# Patient Record
Sex: FEMALE | Race: BLACK OR AFRICAN AMERICAN | NOT HISPANIC OR LATINO | URBAN - METROPOLITAN AREA
[De-identification: names, ages, dates, MRNs, and addresses within clinical notes are randomized per-mention and may not be internally consistent; named-entity substitution may affect disease eponyms.]

---

## 2017-05-08 ENCOUNTER — EMERGENCY (EMERGENCY)
Facility: HOSPITAL | Age: 42
LOS: 0 days | Discharge: HOME | End: 2017-05-08
Attending: EMERGENCY MEDICINE | Admitting: EMERGENCY MEDICINE
Payer: MEDICAID

## 2017-05-08 VITALS
DIASTOLIC BLOOD PRESSURE: 90 MMHG | WEIGHT: 160.5 LBS | TEMPERATURE: 99 F | HEART RATE: 97 BPM | OXYGEN SATURATION: 99 % | SYSTOLIC BLOOD PRESSURE: 151 MMHG | HEIGHT: 63 IN

## 2017-05-08 DIAGNOSIS — F17.210 NICOTINE DEPENDENCE, CIGARETTES, UNCOMPLICATED: ICD-10-CM

## 2017-05-08 DIAGNOSIS — L02.818 CUTANEOUS ABSCESS OF OTHER SITES: ICD-10-CM

## 2017-05-08 DIAGNOSIS — K12.2 CELLULITIS AND ABSCESS OF MOUTH: ICD-10-CM

## 2017-05-08 PROCEDURE — 99283 EMERGENCY DEPT VISIT LOW MDM: CPT | Mod: 25

## 2017-05-08 RX ADMIN — Medication 300 MILLIGRAM(S): at 02:48

## 2017-05-08 NOTE — ED PROVIDER NOTE - ENMT, MLM
Airway patent, Nasal mucosa clear.  Throat has no vesicles, no oropharyngeal exudates and uvula is midline. Pt with 0.5cm abscess to the ant aspect of the upper gums ant.  (+) drainage of purulent fluid.  No surrounding erethema.

## 2017-05-08 NOTE — ED PROVIDER NOTE - OBJECTIVE STATEMENT
42 F presents with co drainage from infection in her mouth.  Pt had been on amoxicillin for the last week.  No co HA, dizziness, cp, sob, abd pain, fever, nvd.

## 2017-05-08 NOTE — ED PROVIDER NOTE - PROGRESS NOTE DETAILS
pressure applies to lesion and all purulent fluid expressed.  Pt used warm water salt rinse and is much improved and stable for DC to follow up with pMD.

## 2017-05-08 NOTE — ED PROVIDER NOTE - MEDICAL DECISION MAKING DETAILS
Pt with drained intra-oral abscess.  advised continues salt water rinse and will Rx with clinda for the next few days.  Pt agrees with plan

## 2018-04-08 ENCOUNTER — EMERGENCY (EMERGENCY)
Facility: HOSPITAL | Age: 43
LOS: 0 days | Discharge: HOME | End: 2018-04-09
Attending: EMERGENCY MEDICINE | Admitting: EMERGENCY MEDICINE
Payer: MEDICAID

## 2018-04-08 VITALS — HEIGHT: 62 IN | WEIGHT: 199.96 LBS

## 2018-04-08 DIAGNOSIS — K05.10 CHRONIC GINGIVITIS, PLAQUE INDUCED: ICD-10-CM

## 2018-04-08 PROCEDURE — 99283 EMERGENCY DEPT VISIT LOW MDM: CPT

## 2018-04-08 RX ORDER — OXYCODONE HYDROCHLORIDE 5 MG/1
1 TABLET ORAL
Qty: 6 | Refills: 0 | OUTPATIENT
Start: 2018-04-08

## 2018-04-08 RX ORDER — CHLORHEXIDINE GLUCONATE 213 G/1000ML
15 SOLUTION TOPICAL ONCE
Qty: 0 | Refills: 0 | Status: COMPLETED | OUTPATIENT
Start: 2018-04-08 | End: 2018-04-08

## 2018-04-08 RX ORDER — LIDOCAINE 4 G/100G
5 CREAM TOPICAL ONCE
Qty: 0 | Refills: 0 | Status: COMPLETED | OUTPATIENT
Start: 2018-04-08 | End: 2018-04-08

## 2018-04-08 RX ORDER — CHLORHEXIDINE GLUCONATE 213 G/1000ML
3 SOLUTION TOPICAL
Qty: 30 | Refills: 0 | OUTPATIENT
Start: 2018-04-08

## 2018-04-08 RX ORDER — OXYCODONE HYDROCHLORIDE 5 MG/1
5 TABLET ORAL ONCE
Qty: 0 | Refills: 0 | Status: DISCONTINUED | OUTPATIENT
Start: 2018-04-08 | End: 2018-04-08

## 2018-04-08 RX ORDER — VALACYCLOVIR 500 MG/1
1000 TABLET, FILM COATED ORAL ONCE
Qty: 0 | Refills: 0 | Status: COMPLETED | OUTPATIENT
Start: 2018-04-08 | End: 2018-04-08

## 2018-04-08 RX ORDER — VALACYCLOVIR 500 MG/1
1 TABLET, FILM COATED ORAL
Qty: 14 | Refills: 0 | OUTPATIENT
Start: 2018-04-08 | End: 2018-04-14

## 2018-04-08 RX ADMIN — LIDOCAINE 5 MILLILITER(S): 4 CREAM TOPICAL at 23:40

## 2018-04-08 RX ADMIN — VALACYCLOVIR 1000 MILLIGRAM(S): 500 TABLET, FILM COATED ORAL at 23:40

## 2018-04-08 RX ADMIN — OXYCODONE HYDROCHLORIDE 5 MILLIGRAM(S): 5 TABLET ORAL at 23:40

## 2018-04-08 NOTE — ED STATDOCS - OBJECTIVE STATEMENT
41 y/o female with hx of dental infections and dental carries with 1 week of right sided upper tooth pain. Dentist put her on Clindamycin but because of too many GI side effects, pt was switched [id number] Amoxacillin. Presents with blisters, sore gums on right side upper lower mouth with associated pain. Taking Ibuprofen, Tylenol with no relief.

## 2018-04-08 NOTE — ED STATDOCS - ENMT, MLM
Nasal mucosa clear.  Throat has no vesicles, no oropharyngeal exudates and uvula is midline. vesicular like lesion on gums on right side. no lesion on cheeks. no lesion on tongue. no trismus. chronic dental carries. no evidence of dental abscess.

## 2018-04-09 VITALS
HEART RATE: 89 BPM | SYSTOLIC BLOOD PRESSURE: 139 MMHG | TEMPERATURE: 99 F | OXYGEN SATURATION: 100 % | RESPIRATION RATE: 19 BRPM | DIASTOLIC BLOOD PRESSURE: 70 MMHG

## 2018-04-09 RX ADMIN — CHLORHEXIDINE GLUCONATE 15 MILLILITER(S): 213 SOLUTION TOPICAL at 00:31

## 2018-04-23 ENCOUNTER — EMERGENCY (EMERGENCY)
Facility: HOSPITAL | Age: 43
LOS: 0 days | Discharge: HOME | End: 2018-04-23
Attending: EMERGENCY MEDICINE | Admitting: EMERGENCY MEDICINE
Payer: MEDICAID

## 2018-04-23 VITALS
HEART RATE: 82 BPM | OXYGEN SATURATION: 100 % | SYSTOLIC BLOOD PRESSURE: 179 MMHG | TEMPERATURE: 98 F | DIASTOLIC BLOOD PRESSURE: 96 MMHG | RESPIRATION RATE: 19 BRPM

## 2018-04-23 VITALS — HEIGHT: 63 IN | WEIGHT: 175.05 LBS
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
## 2018-04-23 DIAGNOSIS — S39.92XA UNSPECIFIED INJURY OF LOWER BACK, INITIAL ENCOUNTER: ICD-10-CM

## 2018-04-23 DIAGNOSIS — M54.5 LOW BACK PAIN: ICD-10-CM
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
## 2018-04-23 PROCEDURE — 99283 EMERGENCY DEPT VISIT LOW MDM: CPT

## 2018-04-23 PROCEDURE — 72100 X-RAY EXAM L-S SPINE 2/3 VWS: CPT | Mod: 26

## 2018-04-23 RX ORDER — OXYCODONE HYDROCHLORIDE 5 MG/1
10 TABLET ORAL ONCE
Qty: 0 | Refills: 0 | Status: DISCONTINUED | OUTPATIENT
Start: 2018-04-23 | End: 2018-04-23

## 2018-04-23 RX ORDER — PREDNISOLONE 5 MG
60 TABLET ORAL ONCE
Qty: 0 | Refills: 0 | Status: DISCONTINUED | OUTPATIENT
Start: 2018-04-23 | End: 2018-04-23

## 2018-04-23 RX ORDER — OXYCODONE HYDROCHLORIDE 5 MG/1
10 TABLET ORAL EVERY 12 HOURS
Qty: 0 | Refills: 0 | Status: DISCONTINUED | OUTPATIENT
Start: 2018-04-23 | End: 2018-04-23

## 2018-04-23 RX ORDER — OXYCODONE HYDROCHLORIDE 5 MG/1
1 TABLET ORAL
Qty: 12 | Refills: 0 | OUTPATIENT
Start: 2018-04-23

## 2018-04-23 RX ADMIN — OXYCODONE HYDROCHLORIDE 10 MILLIGRAM(S): 5 TABLET ORAL at 13:11

## 2018-04-23 RX ADMIN — Medication 60 MILLIGRAM(S): at 13:12

## 2018-04-23 NOTE — ED STATDOCS - CARE PLAN
Principal Discharge DX:	Back pain  Assessment and plan of treatment:	Call your primary care doctor today or tomorrow to schedule follow up appointment for within the next 3-5 days.  Continue taking your medications as prescribed.  Take oxycodone 1 tablet every 4-6 hours as needed for pain. Try to use the percocet as infrequently as possible. Do not drive or drink alcohol while taking Percocet. Do not mix this medication with Tylenol as it contains Tylenol. Stay hydrated when taking this medication as it very commonly causes constipation. You are encouraged to use the stool softener Colace (also called docusate sodium, available over the counter) to avoid constipation. Take this medication sparingly and only when you experience severe pain as it has addiction potential.   Take prednisone course as prescribed.  Return to this Emergency Department if you experience worsening condition or for any other emergency.

## 2018-04-23 NOTE — ED STATDOCS - MEDICAL DECISION MAKING DETAILS
42 y/o F c/o worsening lower back pain, recent slip in shower this morning, "jerked" her back, did not fall with plans to receive pain medication

## 2018-04-23 NOTE — ED ADULT TRIAGE NOTE - CHIEF COMPLAINT QUOTE
pt states lumbar back pain for the past 2 days exacerbated today when she slipped and fell in the shower after her knee gave out. states left leg pain as well. no head trauma or LOC after fall.

## 2018-04-23 NOTE — ED STATDOCS - OBJECTIVE STATEMENT
42 y/o F with a PMhx of chronic back pain on steroids presents to the ED c/o worsening lower lumbar back pain over the past x2 days. Pt states that she recently slipped in shower this morning due to knee giving out which exacerbated the pain, but did not fall. Pt states that she has a neurologist who can no longer see her due to insurance complications. Pt currently calm, able to stand, ambulate, sitting comfortably in POC and denies fever, cough, chills, abd pain, NVD or any other acute c/o at this time.

## 2018-04-23 NOTE — ED STATDOCS - ATTENDING CONTRIBUTION TO CARE
I, Audrey Barrera MD,  performed the initial face to face bedside interview with this patient regarding history of present illness, review of symptoms and relevant past medical, social and family history.  I completed an independent physical examination.  I was the initial provider who evaluated this patient. I have signed out the follow up of any pending tests (i.e. labs, radiological studies) to the resident.  I have communicated the patient’s plan of care and disposition with the resident.  The history, relevant review of systems, past medical and surgical history, medical decision making, and physical examination was documented by the scribe in my presence and I attest to the accuracy of the documentation.

## 2018-04-23 NOTE — ED STATDOCS - PLAN OF CARE
Call your primary care doctor today or tomorrow to schedule follow up appointment for within the next 3-5 days.  Continue taking your medications as prescribed.  Take oxycodone 1 tablet every 4-6 hours as needed for pain. Try to use the percocet as infrequently as possible. Do not drive or drink alcohol while taking Percocet. Do not mix this medication with Tylenol as it contains Tylenol. Stay hydrated when taking this medication as it very commonly causes constipation. You are encouraged to use the stool softener Colace (also called docusate sodium, available over the counter) to avoid constipation. Take this medication sparingly and only when you experience severe pain as it has addiction potential.   Take prednisone course as prescribed.  Return to this Emergency Department if you experience worsening condition or for any other emergency.

## 2018-04-23 NOTE — ED STATDOCS - PROGRESS NOTE DETAILS
Carlos Langley MD PGY4: Patient seen with attending Dr. Barrera. Pt reassessed after steroids and oxycodone, improved and ambulating at baseline. Imaging reviewed. Pt feels comfortable following up. Prednisone course and oxycodone prescribed. Will discharge with instructions for outpatient follow-up.

## 2018-06-01 ENCOUNTER — APPOINTMENT (OUTPATIENT)
Dept: NEUROLOGY | Facility: CLINIC | Age: 43
End: 2018-06-01

## 2018-08-04 ENCOUNTER — EMERGENCY (EMERGENCY)
Facility: HOSPITAL | Age: 43
LOS: 1 days | Discharge: HOME | End: 2018-08-04
Attending: EMERGENCY MEDICINE
Payer: COMMERCIAL

## 2018-08-04 VITALS
WEIGHT: 199.96 LBS | SYSTOLIC BLOOD PRESSURE: 143 MMHG | RESPIRATION RATE: 18 BRPM | HEART RATE: 98 BPM | DIASTOLIC BLOOD PRESSURE: 88 MMHG | TEMPERATURE: 99 F | HEIGHT: 68 IN | OXYGEN SATURATION: 100 %

## 2018-08-04 VITALS
OXYGEN SATURATION: 98 % | DIASTOLIC BLOOD PRESSURE: 84 MMHG | SYSTOLIC BLOOD PRESSURE: 132 MMHG | RESPIRATION RATE: 19 BRPM | HEART RATE: 91 BPM | TEMPERATURE: 99 F

## 2018-08-04 DIAGNOSIS — Z90.49 ACQUIRED ABSENCE OF OTHER SPECIFIED PARTS OF DIGESTIVE TRACT: Chronic | ICD-10-CM

## 2018-08-04 PROBLEM — K04.7 PERIAPICAL ABSCESS WITHOUT SINUS: Chronic | Status: ACTIVE | Noted: 2018-04-08

## 2018-08-04 LAB
ALBUMIN SERPL ELPH-MCNC: 3.8 G/DL — SIGNIFICANT CHANGE UP (ref 3.3–5.2)
ALBUMIN SERPL ELPH-MCNC: 3.9 G/DL — SIGNIFICANT CHANGE UP (ref 3.3–5.2)
ALP SERPL-CCNC: 66 U/L — SIGNIFICANT CHANGE UP (ref 40–120)
ALP SERPL-CCNC: 76 U/L — SIGNIFICANT CHANGE UP (ref 40–120)
ALT FLD-CCNC: 10 U/L — SIGNIFICANT CHANGE UP
ALT FLD-CCNC: 10 U/L — SIGNIFICANT CHANGE UP
ANION GAP SERPL CALC-SCNC: 14 MMOL/L — SIGNIFICANT CHANGE UP (ref 5–17)
ANION GAP SERPL CALC-SCNC: 21 MMOL/L — HIGH (ref 5–17)
APPEARANCE UR: CLEAR — SIGNIFICANT CHANGE UP
APTT BLD: 34.2 SEC — SIGNIFICANT CHANGE UP (ref 27.5–37.4)
AST SERPL-CCNC: 21 U/L — SIGNIFICANT CHANGE UP
AST SERPL-CCNC: 24 U/L — SIGNIFICANT CHANGE UP
BASOPHILS # BLD AUTO: 0 K/UL — SIGNIFICANT CHANGE UP (ref 0–0.2)
BASOPHILS NFR BLD AUTO: 0.2 % — SIGNIFICANT CHANGE UP (ref 0–2)
BILIRUB SERPL-MCNC: 0.4 MG/DL — SIGNIFICANT CHANGE UP (ref 0.4–2)
BILIRUB SERPL-MCNC: 0.6 MG/DL — SIGNIFICANT CHANGE UP (ref 0.4–2)
BILIRUB UR-MCNC: NEGATIVE — SIGNIFICANT CHANGE UP
BUN SERPL-MCNC: 6 MG/DL — LOW (ref 8–20)
BUN SERPL-MCNC: 7 MG/DL — LOW (ref 8–20)
CALCIUM SERPL-MCNC: 9.2 MG/DL — SIGNIFICANT CHANGE UP (ref 8.6–10.2)
CALCIUM SERPL-MCNC: 9.3 MG/DL — SIGNIFICANT CHANGE UP (ref 8.6–10.2)
CHLORIDE SERPL-SCNC: 90 MMOL/L — LOW (ref 98–107)
CHLORIDE SERPL-SCNC: 91 MMOL/L — LOW (ref 98–107)
CO2 SERPL-SCNC: 23 MMOL/L — SIGNIFICANT CHANGE UP (ref 22–29)
CO2 SERPL-SCNC: 27 MMOL/L — SIGNIFICANT CHANGE UP (ref 22–29)
COLOR SPEC: YELLOW — SIGNIFICANT CHANGE UP
CREAT SERPL-MCNC: 0.79 MG/DL — SIGNIFICANT CHANGE UP (ref 0.5–1.3)
CREAT SERPL-MCNC: 0.9 MG/DL — SIGNIFICANT CHANGE UP (ref 0.5–1.3)
DIFF PNL FLD: NEGATIVE — SIGNIFICANT CHANGE UP
EOSINOPHIL # BLD AUTO: 0.1 K/UL — SIGNIFICANT CHANGE UP (ref 0–0.5)
EOSINOPHIL NFR BLD AUTO: 2.1 % — SIGNIFICANT CHANGE UP (ref 0–6)
EPI CELLS # UR: SIGNIFICANT CHANGE UP
GLUCOSE SERPL-MCNC: 133 MG/DL — HIGH (ref 70–115)
GLUCOSE SERPL-MCNC: 141 MG/DL — HIGH (ref 70–115)
GLUCOSE UR QL: NEGATIVE MG/DL — SIGNIFICANT CHANGE UP
HCG SERPL-ACNC: <5 MIU/ML — SIGNIFICANT CHANGE UP
HCT VFR BLD CALC: 41.6 % — SIGNIFICANT CHANGE UP (ref 37–47)
HGB BLD-MCNC: 14.6 G/DL — SIGNIFICANT CHANGE UP (ref 12–16)
INR BLD: 1.21 RATIO — HIGH (ref 0.88–1.16)
KETONES UR-MCNC: ABNORMAL
LEUKOCYTE ESTERASE UR-ACNC: NEGATIVE — SIGNIFICANT CHANGE UP
LYMPHOCYTES # BLD AUTO: 1.3 K/UL — SIGNIFICANT CHANGE UP (ref 1–4.8)
LYMPHOCYTES # BLD AUTO: 22.8 % — SIGNIFICANT CHANGE UP (ref 20–55)
MCHC RBC-ENTMCNC: 30.9 PG — SIGNIFICANT CHANGE UP (ref 27–31)
MCHC RBC-ENTMCNC: 35.1 G/DL — SIGNIFICANT CHANGE UP (ref 32–36)
MCV RBC AUTO: 87.9 FL — SIGNIFICANT CHANGE UP (ref 81–99)
MONOCYTES # BLD AUTO: 0.5 K/UL — SIGNIFICANT CHANGE UP (ref 0–0.8)
MONOCYTES NFR BLD AUTO: 8.8 % — SIGNIFICANT CHANGE UP (ref 3–10)
NEUTROPHILS # BLD AUTO: 3.7 K/UL — SIGNIFICANT CHANGE UP (ref 1.8–8)
NEUTROPHILS NFR BLD AUTO: 65.9 % — SIGNIFICANT CHANGE UP (ref 37–73)
NITRITE UR-MCNC: NEGATIVE — SIGNIFICANT CHANGE UP
PH UR: 6.5 — SIGNIFICANT CHANGE UP (ref 5–8)
PLATELET # BLD AUTO: 269 K/UL — SIGNIFICANT CHANGE UP (ref 150–400)
POTASSIUM SERPL-MCNC: 2.8 MMOL/L — CRITICAL LOW (ref 3.5–5.3)
POTASSIUM SERPL-MCNC: 3.4 MMOL/L — LOW (ref 3.5–5.3)
POTASSIUM SERPL-SCNC: 2.8 MMOL/L — CRITICAL LOW (ref 3.5–5.3)
POTASSIUM SERPL-SCNC: 3.4 MMOL/L — LOW (ref 3.5–5.3)
PROT SERPL-MCNC: 7.6 G/DL — SIGNIFICANT CHANGE UP (ref 6.6–8.7)
PROT SERPL-MCNC: 8.3 G/DL — SIGNIFICANT CHANGE UP (ref 6.6–8.7)
PROT UR-MCNC: 30 MG/DL
PROTHROM AB SERPL-ACNC: 13.4 SEC — HIGH (ref 9.8–12.7)
RBC # BLD: 4.73 M/UL — SIGNIFICANT CHANGE UP (ref 4.4–5.2)
RBC # FLD: 13.6 % — SIGNIFICANT CHANGE UP (ref 11–15.6)
SODIUM SERPL-SCNC: 132 MMOL/L — LOW (ref 135–145)
SODIUM SERPL-SCNC: 134 MMOL/L — LOW (ref 135–145)
SP GR SPEC: 1 — LOW (ref 1.01–1.02)
UROBILINOGEN FLD QL: NEGATIVE MG/DL — SIGNIFICANT CHANGE UP
WBC # BLD: 5.7 K/UL — SIGNIFICANT CHANGE UP (ref 4.8–10.8)
WBC # FLD AUTO: 5.7 K/UL — SIGNIFICANT CHANGE UP (ref 4.8–10.8)
WBC UR QL: SIGNIFICANT CHANGE UP

## 2018-08-04 PROCEDURE — 76830 TRANSVAGINAL US NON-OB: CPT | Mod: 26

## 2018-08-04 PROCEDURE — 76856 US EXAM PELVIC COMPLETE: CPT

## 2018-08-04 PROCEDURE — 99284 EMERGENCY DEPT VISIT MOD MDM: CPT | Mod: 25

## 2018-08-04 PROCEDURE — 81001 URINALYSIS AUTO W/SCOPE: CPT

## 2018-08-04 PROCEDURE — 99284 EMERGENCY DEPT VISIT MOD MDM: CPT

## 2018-08-04 PROCEDURE — 96374 THER/PROPH/DIAG INJ IV PUSH: CPT | Mod: XU

## 2018-08-04 PROCEDURE — 96375 TX/PRO/DX INJ NEW DRUG ADDON: CPT

## 2018-08-04 PROCEDURE — 74177 CT ABD & PELVIS W/CONTRAST: CPT

## 2018-08-04 PROCEDURE — 85027 COMPLETE CBC AUTOMATED: CPT

## 2018-08-04 PROCEDURE — 76830 TRANSVAGINAL US NON-OB: CPT

## 2018-08-04 PROCEDURE — 85610 PROTHROMBIN TIME: CPT

## 2018-08-04 PROCEDURE — 74177 CT ABD & PELVIS W/CONTRAST: CPT | Mod: 26

## 2018-08-04 PROCEDURE — 80053 COMPREHEN METABOLIC PANEL: CPT

## 2018-08-04 PROCEDURE — 85730 THROMBOPLASTIN TIME PARTIAL: CPT

## 2018-08-04 PROCEDURE — 84702 CHORIONIC GONADOTROPIN TEST: CPT

## 2018-08-04 PROCEDURE — 36415 COLL VENOUS BLD VENIPUNCTURE: CPT

## 2018-08-04 PROCEDURE — 76856 US EXAM PELVIC COMPLETE: CPT | Mod: 26

## 2018-08-04 RX ORDER — ACETAMINOPHEN 500 MG
650 TABLET ORAL ONCE
Qty: 0 | Refills: 0 | Status: COMPLETED | OUTPATIENT
Start: 2018-08-04 | End: 2018-08-04

## 2018-08-04 RX ORDER — POTASSIUM CHLORIDE 20 MEQ
10 PACKET (EA) ORAL
Qty: 0 | Refills: 0 | Status: COMPLETED | OUTPATIENT
Start: 2018-08-04 | End: 2018-08-04

## 2018-08-04 RX ORDER — SODIUM CHLORIDE 9 MG/ML
2000 INJECTION, SOLUTION INTRAVENOUS ONCE
Qty: 0 | Refills: 0 | Status: COMPLETED | OUTPATIENT
Start: 2018-08-04 | End: 2018-08-04

## 2018-08-04 RX ORDER — ONDANSETRON 8 MG/1
8 TABLET, FILM COATED ORAL ONCE
Qty: 0 | Refills: 0 | Status: COMPLETED | OUTPATIENT
Start: 2018-08-04 | End: 2018-08-04

## 2018-08-04 RX ORDER — POTASSIUM CHLORIDE 20 MEQ
40 PACKET (EA) ORAL ONCE
Qty: 0 | Refills: 0 | Status: COMPLETED | OUTPATIENT
Start: 2018-08-04 | End: 2018-08-04

## 2018-08-04 RX ADMIN — Medication 40 MILLIEQUIVALENT(S): at 23:00

## 2018-08-04 RX ADMIN — SODIUM CHLORIDE 2000 MILLILITER(S): 9 INJECTION, SOLUTION INTRAVENOUS at 14:52

## 2018-08-04 RX ADMIN — ONDANSETRON 8 MILLIGRAM(S): 8 TABLET, FILM COATED ORAL at 14:51

## 2018-08-04 RX ADMIN — Medication 100 MILLIEQUIVALENT(S): at 21:57

## 2018-08-04 RX ADMIN — Medication 100 MILLIEQUIVALENT(S): at 17:47

## 2018-08-04 RX ADMIN — Medication 100 MILLIEQUIVALENT(S): at 14:51

## 2018-08-04 NOTE — ED ADULT NURSE NOTE - NSIMPLEMENTINTERV_GEN_ALL_ED
Implemented All Universal Safety Interventions:  [place] to call system. Call bell, personal items and telephone within reach. Instruct patient to call for assistance. Room bathroom lighting operational. Non-slip footwear when patient is off stretcher. Physically safe environment: no spills, clutter or unnecessary equipment. Stretcher in lowest position, wheels locked, appropriate side rails in place.

## 2018-08-04 NOTE — ED ADULT NURSE REASSESSMENT NOTE - NS ED NURSE REASSESS COMMENT FT1
Assumed care of pt at 1930. pt ambulating through martin with steady gait, in no apparent distress or discomfort. pt found outside smoking by previous RN, pt informed cannot leave the ED to smoke, especially with IV in place. pt verbalizes understanding. ED MD @ bedside, per MD meal provided. pt awaiting 3rd K-rider and US, will continue to monitor.

## 2018-08-04 NOTE — CONSULT NOTE ADULT - SUBJECTIVE AND OBJECTIVE BOX
Patient is a 44 y/o woman presenting with a 1 week history of vomiting, diarrhea, and RLQ pain. She reports this being the usual presentation for her pancreatitis. Patient was found to have bilateral right ovarian cysts on pelvic CT, the right ovarian cyst measuring 4.6cm. Ultrasound found the right ovarian cyst to measure 4.5cm.     PMH: pancreatitis, DM, HTN, and Rheumatoid Arthritis.   PSH: cholecystectomy, hysterectomy     O:   Vitals: BP (132/84), HR (91)  Gen: no acute distress  Patient deferred physical exam.     < from: US Transvaginal (08.04.18 @ 20:22) >  Right ovary: Mildly enlarged measuring 5.6 x 4.2 x 4.1 cm secondary to a   cyst measuring 4.5 x 3.1 x 3.9 cm cm. 1.2 cm right paraovarian cyst.   Color-flow and arterial waveforms identified.    Left ovary: Not well visualized transabdominally or transvaginally.    Fluid: Small to moderate free fluid in the pelvic cul-de-sac.    IMPRESSION:    Status post hysterectomy. Mildly enlarged right ovary secondary to a 4.5   cm cyst. While flow is present, this does not preclude torsion and   clinical correlation is recommended. Additionally, follow-up pelvic   sonogram in 6 weeks is recommended to reassess cyst for stability and/or   resolution.    < end of copied text >  < from: CT Abdomen and Pelvis w/ IV Cont (08.04.18 @ 16:19) >  IMPRESSION:     No acute pathology.    Bilateral adnexal cysts measuring 4.6 cm on the right and 2.0 cm on the   left. Small pelvic free fluid. No free air.    < end of copied text >      A/P: 44 y/o presenting with RLQ pain, vomiting, and diarrhea with incidental finding of right ovarian cyst measuring 4.6cm being assessed for possible torsion. At this time there is low clinical suspicion of ovarian torsion given the patients mild distress, long standing pancreatitis history with similar symptoms, and visualized blood flow to the ovary on ultrasound. Patient will follow up with cyst progression with Gynecologist on outpatient setting.

## 2018-08-04 NOTE — ED PROVIDER NOTE - OBJECTIVE STATEMENT
44 y/o F pt with PMHx of pancreatitis, DM and HTN and PSHx of cholecystectomy and hysterectomy presents to ED c/o diffuse abdominal pain, vomiting, and diarrhea with decreased PO intake x1 week. Pt was evaluated at [facility] 3 days ago and was diagnosed with hypokalemia. She had a CT with PO contrast but is unsure of the results. She was given potassium and went home with Zofran without relief. Pt states she experiences about 3 episodes of vomiting a day (white colored) and states "I have diarrhea all day" described as yellow and black mucous. She denies use of iron pills. Pt denies fever, chills, CP, SOB, back pain, dysuria, hematuria, bloody stool, hematemesis, and headache. No further complaints at this time.

## 2018-08-04 NOTE — ED PROVIDER NOTE - PROGRESS NOTE DETAILS
Case s/o to Dr Linn pending GYN consult for right ovarian cyst and repeat CMP Pt seen  by GYN/Dr. Flores and pt refusing exam.  Repeat K 3.4.  will give po K and pt to f/u PMD GYN and Med/Dr. Garcia as outpt

## 2018-08-06 ENCOUNTER — EMERGENCY (EMERGENCY)
Facility: HOSPITAL | Age: 43
LOS: 1 days | Discharge: HOME | End: 2018-08-06
Attending: EMERGENCY MEDICINE
Payer: COMMERCIAL

## 2018-08-06 VITALS
HEART RATE: 86 BPM | SYSTOLIC BLOOD PRESSURE: 134 MMHG | TEMPERATURE: 99 F | RESPIRATION RATE: 18 BRPM | DIASTOLIC BLOOD PRESSURE: 82 MMHG | OXYGEN SATURATION: 100 %

## 2018-08-06 VITALS — WEIGHT: 199.96 LBS | HEIGHT: 63 IN

## 2018-08-06 DIAGNOSIS — Z90.49 ACQUIRED ABSENCE OF OTHER SPECIFIED PARTS OF DIGESTIVE TRACT: Chronic | ICD-10-CM

## 2018-08-06 PROBLEM — I10 ESSENTIAL (PRIMARY) HYPERTENSION: Chronic | Status: ACTIVE | Noted: 2018-08-04

## 2018-08-06 LAB
ALBUMIN SERPL ELPH-MCNC: 3.7 G/DL — SIGNIFICANT CHANGE UP (ref 3.3–5.2)
ALP SERPL-CCNC: 66 U/L — SIGNIFICANT CHANGE UP (ref 40–120)
ALT FLD-CCNC: 9 U/L — SIGNIFICANT CHANGE UP
ANION GAP SERPL CALC-SCNC: 16 MMOL/L — SIGNIFICANT CHANGE UP (ref 5–17)
APPEARANCE UR: CLEAR — SIGNIFICANT CHANGE UP
AST SERPL-CCNC: 23 U/L — SIGNIFICANT CHANGE UP
BASOPHILS # BLD AUTO: 0 K/UL — SIGNIFICANT CHANGE UP (ref 0–0.2)
BASOPHILS NFR BLD AUTO: 0.1 % — SIGNIFICANT CHANGE UP (ref 0–2)
BILIRUB SERPL-MCNC: 0.4 MG/DL — SIGNIFICANT CHANGE UP (ref 0.4–2)
BILIRUB UR-MCNC: NEGATIVE — SIGNIFICANT CHANGE UP
BUN SERPL-MCNC: 7 MG/DL — LOW (ref 8–20)
CALCIUM SERPL-MCNC: 9 MG/DL — SIGNIFICANT CHANGE UP (ref 8.6–10.2)
CHLORIDE SERPL-SCNC: 97 MMOL/L — LOW (ref 98–107)
CO2 SERPL-SCNC: 23 MMOL/L — SIGNIFICANT CHANGE UP (ref 22–29)
COLOR SPEC: YELLOW — SIGNIFICANT CHANGE UP
CREAT SERPL-MCNC: 0.68 MG/DL — SIGNIFICANT CHANGE UP (ref 0.5–1.3)
DIFF PNL FLD: NEGATIVE — SIGNIFICANT CHANGE UP
EOSINOPHIL # BLD AUTO: 0 K/UL — SIGNIFICANT CHANGE UP (ref 0–0.5)
EOSINOPHIL NFR BLD AUTO: 0.6 % — SIGNIFICANT CHANGE UP (ref 0–6)
GLUCOSE SERPL-MCNC: 103 MG/DL — SIGNIFICANT CHANGE UP (ref 70–115)
GLUCOSE UR QL: NEGATIVE MG/DL — SIGNIFICANT CHANGE UP
HCG SERPL-ACNC: <5 MIU/ML — SIGNIFICANT CHANGE UP
HCT VFR BLD CALC: 39.1 % — SIGNIFICANT CHANGE UP (ref 37–47)
HGB BLD-MCNC: 13.6 G/DL — SIGNIFICANT CHANGE UP (ref 12–16)
KETONES UR-MCNC: ABNORMAL
LEUKOCYTE ESTERASE UR-ACNC: NEGATIVE — SIGNIFICANT CHANGE UP
LIDOCAIN IGE QN: 42 U/L — SIGNIFICANT CHANGE UP (ref 22–51)
LYMPHOCYTES # BLD AUTO: 1.2 K/UL — SIGNIFICANT CHANGE UP (ref 1–4.8)
LYMPHOCYTES # BLD AUTO: 15.5 % — LOW (ref 20–55)
MAGNESIUM SERPL-MCNC: 1.9 MG/DL — SIGNIFICANT CHANGE UP (ref 1.6–2.6)
MCHC RBC-ENTMCNC: 31.1 PG — HIGH (ref 27–31)
MCHC RBC-ENTMCNC: 34.8 G/DL — SIGNIFICANT CHANGE UP (ref 32–36)
MCV RBC AUTO: 89.3 FL — SIGNIFICANT CHANGE UP (ref 81–99)
MONOCYTES # BLD AUTO: 0.6 K/UL — SIGNIFICANT CHANGE UP (ref 0–0.8)
MONOCYTES NFR BLD AUTO: 7 % — SIGNIFICANT CHANGE UP (ref 3–10)
NEUTROPHILS # BLD AUTO: 6.1 K/UL — SIGNIFICANT CHANGE UP (ref 1.8–8)
NEUTROPHILS NFR BLD AUTO: 76.5 % — HIGH (ref 37–73)
NITRITE UR-MCNC: NEGATIVE — SIGNIFICANT CHANGE UP
PH UR: 6 — SIGNIFICANT CHANGE UP (ref 5–8)
PLATELET # BLD AUTO: 239 K/UL — SIGNIFICANT CHANGE UP (ref 150–400)
POTASSIUM SERPL-MCNC: 3.4 MMOL/L — LOW (ref 3.5–5.3)
POTASSIUM SERPL-SCNC: 3.4 MMOL/L — LOW (ref 3.5–5.3)
PROT SERPL-MCNC: 7.6 G/DL — SIGNIFICANT CHANGE UP (ref 6.6–8.7)
PROT UR-MCNC: NEGATIVE MG/DL — SIGNIFICANT CHANGE UP
RBC # BLD: 4.38 M/UL — LOW (ref 4.4–5.2)
RBC # FLD: 13.7 % — SIGNIFICANT CHANGE UP (ref 11–15.6)
SODIUM SERPL-SCNC: 136 MMOL/L — SIGNIFICANT CHANGE UP (ref 135–145)
SP GR SPEC: 1 — LOW (ref 1.01–1.02)
UROBILINOGEN FLD QL: NEGATIVE MG/DL — SIGNIFICANT CHANGE UP
WBC # BLD: 8 K/UL — SIGNIFICANT CHANGE UP (ref 4.8–10.8)
WBC # FLD AUTO: 8 K/UL — SIGNIFICANT CHANGE UP (ref 4.8–10.8)

## 2018-08-06 PROCEDURE — 80053 COMPREHEN METABOLIC PANEL: CPT

## 2018-08-06 PROCEDURE — 85027 COMPLETE CBC AUTOMATED: CPT

## 2018-08-06 PROCEDURE — 99283 EMERGENCY DEPT VISIT LOW MDM: CPT

## 2018-08-06 PROCEDURE — 36415 COLL VENOUS BLD VENIPUNCTURE: CPT

## 2018-08-06 PROCEDURE — 99284 EMERGENCY DEPT VISIT MOD MDM: CPT

## 2018-08-06 PROCEDURE — 83690 ASSAY OF LIPASE: CPT

## 2018-08-06 PROCEDURE — 83735 ASSAY OF MAGNESIUM: CPT

## 2018-08-06 PROCEDURE — 84702 CHORIONIC GONADOTROPIN TEST: CPT

## 2018-08-06 PROCEDURE — 81003 URINALYSIS AUTO W/O SCOPE: CPT

## 2018-08-06 RX ORDER — SODIUM CHLORIDE 9 MG/ML
1000 INJECTION INTRAMUSCULAR; INTRAVENOUS; SUBCUTANEOUS ONCE
Qty: 0 | Refills: 0 | Status: COMPLETED | OUTPATIENT
Start: 2018-08-06 | End: 2018-08-06

## 2018-08-06 RX ORDER — POTASSIUM CHLORIDE 20 MEQ
40 PACKET (EA) ORAL ONCE
Qty: 0 | Refills: 0 | Status: COMPLETED | OUTPATIENT
Start: 2018-08-06 | End: 2018-08-06

## 2018-08-06 RX ADMIN — Medication 40 MILLIEQUIVALENT(S): at 13:58

## 2018-08-06 RX ADMIN — SODIUM CHLORIDE 1000 MILLILITER(S): 9 INJECTION INTRAMUSCULAR; INTRAVENOUS; SUBCUTANEOUS at 15:14

## 2018-08-06 NOTE — ED ADULT TRIAGE NOTE - CHIEF COMPLAINT QUOTE
'I was here the other day and they told me I was hypokalemia and I still am. I can't see my doctor. " Pt states her symptoms are nausea and diarrhea.

## 2018-08-06 NOTE — ED STATDOCS - OBJECTIVE STATEMENT
43yoF with h/o diet-controlled DM, HTN, sciatica, RA, GERD, depression, presenting with watery stool x 10 days, NB, associated with nausea/ vomiting. Pt states she went to [facility] on lat Weds and found K to be 3.0; she then came here on last Saturday and was found to have a level 2.8 - states she was repleted but continued to have diarrhea . She states she feels anxious when her K drops and she feels that way know. Denies myalgias, weakness, or palpitations.  Daughter also sick with vomiting and diarrhea. Denies recent antibiotics or travel. Pt states that her nausea/ vomiting has been controlled since she was prescribed zofran

## 2018-08-06 NOTE — ED ADULT NURSE NOTE - NSIMPLEMENTINTERV_GEN_ALL_ED
Implemented All Universal Safety Interventions:  [place] to call system. Call bell, personal items and telephone within reach. Instruct patient to call for assistance. Room bathroom lighting operational. Non-slip footwear when patient is off stretcher. Physically safe environment: no spills, clutter or unnecessary equipment. Stretcher in lowest position, wheels locked, appropriate side rails in place.

## 2018-08-06 NOTE — ED STATDOCS - PROGRESS NOTE DETAILS
I DISCUSSED RESULTS W/ PT.  I OFFERED TO SEND STOOL STUDIES, BUT SHE DID NOT PRODUCE A SAMPLE. PT ADVISED TO DRINK PEDIALYTE, AVOID SUGARY DRINKS, F/U WITH DOCTOR. PT TO BE DISCHARGED AFTER IVF.

## 2018-08-06 NOTE — ED STATDOCS - MEDICAL DECISION MAKING DETAILS
Pt presents with nausea/vomiting/ diarrhea x 10 days; h/o hypokalemia. Plan to check labs, UA, reevaluate.

## 2018-11-07 ENCOUNTER — EMERGENCY (EMERGENCY)
Facility: HOSPITAL | Age: 43
LOS: 0 days | Discharge: HOME | End: 2018-11-07
Attending: EMERGENCY MEDICINE | Admitting: EMERGENCY MEDICINE
Payer: MEDICAID

## 2018-11-07 VITALS
OXYGEN SATURATION: 100 % | RESPIRATION RATE: 19 BRPM | DIASTOLIC BLOOD PRESSURE: 95 MMHG | TEMPERATURE: 98 F | HEART RATE: 71 BPM | SYSTOLIC BLOOD PRESSURE: 153 MMHG

## 2018-11-07 VITALS — WEIGHT: 220.02 LBS | HEIGHT: 67 IN

## 2018-11-07 DIAGNOSIS — G89.29 OTHER CHRONIC PAIN: ICD-10-CM

## 2018-11-07 DIAGNOSIS — E11.9 TYPE 2 DIABETES MELLITUS WITHOUT COMPLICATIONS: ICD-10-CM

## 2018-11-07 DIAGNOSIS — M54.9 DORSALGIA, UNSPECIFIED: ICD-10-CM

## 2018-11-07 DIAGNOSIS — Z90.49 ACQUIRED ABSENCE OF OTHER SPECIFIED PARTS OF DIGESTIVE TRACT: Chronic | ICD-10-CM

## 2018-11-07 DIAGNOSIS — I10 ESSENTIAL (PRIMARY) HYPERTENSION: ICD-10-CM

## 2018-11-07 PROCEDURE — 99283 EMERGENCY DEPT VISIT LOW MDM: CPT

## 2018-11-07 RX ORDER — KETOROLAC TROMETHAMINE 30 MG/ML
30 SYRINGE (ML) INJECTION ONCE
Qty: 0 | Refills: 0 | Status: DISCONTINUED | OUTPATIENT
Start: 2018-11-07 | End: 2018-11-07

## 2018-11-07 RX ORDER — OXYCODONE HYDROCHLORIDE 5 MG/1
5 TABLET ORAL ONCE
Qty: 0 | Refills: 0 | Status: DISCONTINUED | OUTPATIENT
Start: 2018-11-07 | End: 2018-11-07

## 2018-11-07 RX ORDER — METHOCARBAMOL 500 MG/1
750 TABLET, FILM COATED ORAL ONCE
Qty: 0 | Refills: 0 | Status: COMPLETED | OUTPATIENT
Start: 2018-11-07 | End: 2018-11-07

## 2018-11-07 RX ORDER — OXYCODONE HYDROCHLORIDE 5 MG/1
1 TABLET ORAL
Qty: 3 | Refills: 0
Start: 2018-11-07

## 2018-11-07 RX ADMIN — OXYCODONE HYDROCHLORIDE 5 MILLIGRAM(S): 5 TABLET ORAL at 13:35

## 2018-11-07 NOTE — ED STATDOCS - NS_ ATTENDINGSCRIBEDETAILS _ED_A_ED_FT
I, Teto Garibay MD,  performed the initial face to face bedside interview with this patient regarding history of present illness, review of symptoms and relevant past medical, social and family history.  I completed an independent physical examination.    The history, relevant review of systems, past medical and surgical history, medical decision making, and physical examination was documented by the scribe in my presence and I attest to the accuracy of the documentation.

## 2018-11-07 NOTE — ED STATDOCS - OBJECTIVE STATEMENT
42 y/o female with PMHx of sciatica, DM, HTN, RA presents to the ED c/o back pain. Pt states that she knows the pain is from her sciatica. Pt was seen at [facility] 2 days ago for right foot pain, states she has foot strain. Pt has still been walking on the foot, thinks that walking on the foot has exacerbated the sx. Foot pain has now radiated to right calf. Denies saddle anesthesia, incontinence if urine or bowel. Allergic to amoxacillin. Pt took 2 10mg prednisone today with no pain relief. Pt had a cardiac catheter last week.

## 2018-11-07 NOTE — ED STATDOCS - PROGRESS NOTE DETAILS
had a detailed discussion with patient regarding pain meds and ER policy. Will write 1 day supply. Patient is having insurance issues and her Neurologist Dr. Bueno is no longer accepting. Will refer to another Neuro/ortho. Patient is feeling much better, tests/labs reviewed. case discussed with attending. OK to dc home. JACE Yeager

## 2018-11-07 NOTE — ED ADULT NURSE NOTE - OBJECTIVE STATEMENT
pt heaving lower back pain radiating to both legs .pt states she can not take Nsaids OR muscle relaxer. But states she run out of her oxycodone and she would like to received oxycodone.

## 2018-11-07 NOTE — ED STATDOCS - PHYSICAL EXAMINATION
GEN: AOX3, NAD. HEENT: Throat clear. Head NC/AT. NECK: Supple, No JVD. FROM. C-spine non-tender. CV:S1S2, RRR, LUNGS: CTA b/l, no w/r/r. ABD: Soft, NT/ND, no rebound, no guarding. No CVAT. EXT: No e/c/c. 2+ distal pulses. SKIN: No rashes. BACK: +Mild muscular tenderness lower back paravertebral area. FROM with minimal discomfort. NEURO: No focal deficits. CN II-XII intact. FROM. 5/5 motor and sensory. JACE Yeager

## 2018-11-07 NOTE — ED ADULT NURSE NOTE - NSIMPLEMENTINTERV_GEN_ALL_ED
Implemented All Universal Safety Interventions:  [place] to call system. Call bell, personal items and telephone within reach. Instruct patient to call for assistance. Room bathroom lighting operational. Non-slip footwear when patient is off stretcher. Physically safe environment: no spills, clutter or unnecessary equipment. Stretcher in lowest position, wheels locked, appropriate side rails in place.

## 2018-11-13 ENCOUNTER — APPOINTMENT (OUTPATIENT)
Dept: GASTROENTEROLOGY | Facility: CLINIC | Age: 43
End: 2018-11-13
Payer: MEDICAID

## 2018-11-13 VITALS
DIASTOLIC BLOOD PRESSURE: 94 MMHG | WEIGHT: 210 LBS | BODY MASS INDEX: 37.21 KG/M2 | HEART RATE: 66 BPM | HEIGHT: 63 IN | SYSTOLIC BLOOD PRESSURE: 148 MMHG

## 2018-11-13 DIAGNOSIS — Z78.9 OTHER SPECIFIED HEALTH STATUS: ICD-10-CM

## 2018-11-13 DIAGNOSIS — G89.29 GENERALIZED ABDOMINAL PAIN: ICD-10-CM

## 2018-11-13 DIAGNOSIS — Z87.19 PERSONAL HISTORY OF OTHER DISEASES OF THE DIGESTIVE SYSTEM: ICD-10-CM

## 2018-11-13 DIAGNOSIS — Z83.79 FAMILY HISTORY OF OTHER DISEASES OF THE DIGESTIVE SYSTEM: ICD-10-CM

## 2018-11-13 DIAGNOSIS — R10.84 GENERALIZED ABDOMINAL PAIN: ICD-10-CM

## 2018-11-13 DIAGNOSIS — F17.210 NICOTINE DEPENDENCE, CIGARETTES, UNCOMPLICATED: ICD-10-CM

## 2018-11-13 DIAGNOSIS — Z76.5 MALINGERER [CONSCIOUS SIMULATION]: ICD-10-CM

## 2018-11-13 DIAGNOSIS — R11.2 NAUSEA WITH VOMITING, UNSPECIFIED: ICD-10-CM

## 2018-11-13 DIAGNOSIS — K52.9 NONINFECTIVE GASTROENTERITIS AND COLITIS, UNSPECIFIED: ICD-10-CM

## 2018-11-13 PROCEDURE — 99406 BEHAV CHNG SMOKING 3-10 MIN: CPT

## 2018-11-13 PROCEDURE — 99205 OFFICE O/P NEW HI 60 MIN: CPT

## 2018-11-13 RX ORDER — ONDANSETRON 4 MG/1
4 TABLET, ORALLY DISINTEGRATING ORAL
Qty: 30 | Refills: 0 | Status: ACTIVE | COMMUNITY
Start: 2018-11-13 | End: 1900-01-01

## 2018-11-13 RX ORDER — POLYETHYLENE GLYOCOL 3350, SODIUM CHLORIDE, SODIUM BICARBONATE AND POTASSIUM CHLORIDE 420; 11.2; 5.72; 1.48 G/4L; G/4L; G/4L; G/4L
420 POWDER, FOR SOLUTION NASOGASTRIC; ORAL
Qty: 1 | Refills: 0 | Status: ACTIVE | COMMUNITY
Start: 2018-11-13 | End: 1900-01-01

## 2018-11-13 RX ORDER — POLYETHYLENE GLYCOL 3350, SODIUM SULFATE, SODIUM CHLORIDE, POTASSIUM CHLORIDE, ASCORBIC ACID, SODIUM ASCORBATE 7.5-2.691G
100 KIT ORAL
Qty: 1 | Refills: 0 | Status: DISCONTINUED | COMMUNITY
Start: 2018-11-13 | End: 2018-11-13

## 2018-11-13 RX ORDER — HYDRALAZINE HYDROCHLORIDE 50 MG/1
50 TABLET ORAL
Refills: 0 | Status: ACTIVE | COMMUNITY

## 2018-11-13 RX ORDER — OXYCODONE 10 MG/1
10 TABLET ORAL
Refills: 0 | Status: ACTIVE | COMMUNITY

## 2018-11-13 RX ORDER — ONDANSETRON HYDROCHLORIDE 4 MG/1
4 TABLET, FILM COATED ORAL
Refills: 0 | Status: ACTIVE | COMMUNITY

## 2018-11-13 RX ORDER — METOPROLOL/HYDROCHLOROTHIAZIDE 100MG-25MG
100-25 TABLET ORAL
Refills: 0 | Status: ACTIVE | COMMUNITY

## 2018-11-18 PROBLEM — M54.30 SCIATICA, UNSPECIFIED SIDE: Chronic | Status: INACTIVE | Noted: 2018-11-07 | Resolved: 2018-11-17

## 2018-11-26 ENCOUNTER — OTHER (OUTPATIENT)
Age: 43
End: 2018-11-26

## 2018-11-27 ENCOUNTER — OTHER (OUTPATIENT)
Age: 43
End: 2018-11-27

## 2018-12-24 ENCOUNTER — APPOINTMENT (OUTPATIENT)
Dept: NEUROLOGY | Facility: CLINIC | Age: 43
End: 2018-12-24

## 2019-03-11 ENCOUNTER — EMERGENCY (EMERGENCY)
Facility: HOSPITAL | Age: 44
LOS: 1 days | Discharge: HOME | End: 2019-03-11
Attending: EMERGENCY MEDICINE
Payer: COMMERCIAL

## 2019-03-11 VITALS
TEMPERATURE: 99 F | SYSTOLIC BLOOD PRESSURE: 164 MMHG | OXYGEN SATURATION: 99 % | HEART RATE: 79 BPM | DIASTOLIC BLOOD PRESSURE: 98 MMHG | RESPIRATION RATE: 20 BRPM

## 2019-03-11 DIAGNOSIS — Z90.49 ACQUIRED ABSENCE OF OTHER SPECIFIED PARTS OF DIGESTIVE TRACT: Chronic | ICD-10-CM

## 2019-03-11 PROCEDURE — 99283 EMERGENCY DEPT VISIT LOW MDM: CPT

## 2019-03-11 RX ORDER — OXYCODONE HYDROCHLORIDE 5 MG/1
1 TABLET ORAL
Qty: 6 | Refills: 0
Start: 2019-03-11

## 2019-03-11 RX ORDER — OXYCODONE HYDROCHLORIDE 5 MG/1
15 TABLET ORAL ONCE
Qty: 0 | Refills: 0 | Status: DISCONTINUED | OUTPATIENT
Start: 2019-03-11 | End: 2019-03-11

## 2019-03-11 RX ADMIN — OXYCODONE HYDROCHLORIDE 15 MILLIGRAM(S): 5 TABLET ORAL at 21:50

## 2019-03-11 NOTE — ED PROVIDER NOTE - CLINICAL SUMMARY MEDICAL DECISION MAKING FREE TEXT BOX
44 yo female PMHx spinal surgery 1/2019 and 2/2019 @ [facility] c/o back pain and medication refill. Has not had proper outpatient follow up 2/2 insurance issues. Will give two days of medication and ample outpatient follow up.

## 2019-03-11 NOTE — ED PROVIDER NOTE - PROGRESS NOTE DETAILS
PA Note: Provided 2 days of medication. Provided pt with amble follow up. Patient provided ample opportunity to ask questions which were answered to the fullest extent. Patient verbalized understanding and agreement of plan.

## 2019-03-11 NOTE — ED ADULT TRIAGE NOTE - CHIEF COMPLAINT QUOTE
pt with back surgery jan and feb, ran of pain med over the weekend and now with pain due to no medication because of surgery. pt able to walk with walker

## 2019-03-11 NOTE — ED PROVIDER NOTE - OBJECTIVE STATEMENT
44 yo female PMHx spinal surgery 1/2019 and 2/2019 @ [facility], HTN, RA, ?DM, chronic pancreatitis, polycystic liver BIBA c/o back pain. Reports ran out of medication. Went to [facility] 4 days ago for medication refill, but states was given Percocet which is the "wrong medication," because of her liver; however, did complete all medication given. Pt requesting refill of oxycodone 15mg. States cannot have steroids because causes her to become "hypokalemic." Pt does not have proper outpatient follow up 2/2 insurance issues. At baseline, pt has bladder incontinence. Previously, was seen by Dr. Rebolledo for pain management; surgeon Dr. Chavez. Took BP medication today. No further complaints at this time.   Denies fever/chills, cough, SOB, leg pain/swelling.   PCP: None

## 2019-03-11 NOTE — ED PROVIDER NOTE - CARE PLAN
Principal Discharge DX:	Chronic low back pain, unspecified back pain laterality, with sciatica presence unspecified  Secondary Diagnosis:	Medication refill

## 2019-03-11 NOTE — ED PROVIDER NOTE - PHYSICAL EXAMINATION
General: In NAD, non-toxic appearing; tearful, sitting leaning to left side; walker.   Cardio: No lifts, heaves, visible pulsations. No thrills. Rate and rhythm regular, S1 & S2 clear. No audible murmur, gallop, or rub.   PV: Pulses: b/l 2+ radial, DP, PT. No calf swelling/tenderness. Mild ankle/pedal edema b/l. Capillary refill <2 seconds.  Resp: Normal AP to lateral diameter, symmetrical excursion b/l. No chest wall tenderness. Breath sounds vesicular, symmetrical and without rales, rhonchi or wheezing b/l.  MSK/Neuro: Ambulates with walker, shuffling gate. Sensation intact.

## 2019-03-11 NOTE — ED PROVIDER NOTE - ATTENDING CONTRIBUTION TO CARE
Oscar: I performed a face to face bedside interview with patient regarding history of present illness, review of symptoms and past medical history. I completed an independent physical exam.  I have discussed patient's plan of care with advanced care provider.   I agree with note as stated above including HISTORY OF PRESENT ILLNESS, HIV, PAST MEDICAL/SURGICAL/FAMILY/SOCIAL HISTORY, ALLERGIES AND HOME MEDICATIONS, REVIEW OF SYSTEMS, PHYSICAL EXAM, MEDICAL DECISION MAKING and any PROGRESS NOTES during the time I functioned as the attending physician for this patient  unless otherwise noted. My brief assessment is as follows: pt rant out of pain meds, chronic back pain, no new symptoms, no neuro symptoms, ambulates with walker, istop showing several short courses of oxy 15, will give one day oxy 15, pain management f/u.

## 2019-03-12 ENCOUNTER — EMERGENCY (EMERGENCY)
Facility: HOSPITAL | Age: 44
LOS: 1 days | Discharge: HOME | End: 2019-03-12
Attending: EMERGENCY MEDICINE
Payer: COMMERCIAL

## 2019-03-12 VITALS
TEMPERATURE: 98 F | DIASTOLIC BLOOD PRESSURE: 89 MMHG | OXYGEN SATURATION: 100 % | HEIGHT: 63 IN | SYSTOLIC BLOOD PRESSURE: 150 MMHG | HEART RATE: 73 BPM | RESPIRATION RATE: 18 BRPM | WEIGHT: 220.02 LBS

## 2019-03-12 DIAGNOSIS — Z90.49 ACQUIRED ABSENCE OF OTHER SPECIFIED PARTS OF DIGESTIVE TRACT: Chronic | ICD-10-CM

## 2019-03-12 DIAGNOSIS — Z98.890 OTHER SPECIFIED POSTPROCEDURAL STATES: Chronic | ICD-10-CM

## 2019-03-12 PROBLEM — M54.30 SCIATICA, UNSPECIFIED SIDE: Chronic | Status: ACTIVE | Noted: 2018-11-17

## 2019-03-12 PROCEDURE — 99283 EMERGENCY DEPT VISIT LOW MDM: CPT

## 2019-03-12 RX ORDER — OXYCODONE HYDROCHLORIDE 5 MG/1
1 TABLET ORAL
Qty: 18 | Refills: 0
Start: 2019-03-12 | End: 2019-03-14

## 2019-03-12 NOTE — ED STATDOCS - CLINICAL SUMMARY MEDICAL DECISION MAKING FREE TEXT BOX
Pt here for pain medication refill; cites insurance issue and no f/u for 7 days;  I spoke with pharmacist at [facility] who verifies that oxycontin is not available;  Pt Istopped and has h/o multiple prescriptions for oxycodone including one for 15mg Q4h x 5 days, however last prescription was 2 days on 3/8.

## 2019-03-12 NOTE — ED STATDOCS - OBJECTIVE STATEMENT
Pt is a 43yoF with h/o spinal surgery at [facility] on 2/21, presenting for medication refill;  Pt states she was here yesterday, saw the doctor and was sent oxycontin 15 ER however the pharmacist reports that the pharmacy only carries oxycoDONE 15mg IR;  Pt states she was on 15mg oxycodone every 6 hours and never took oxycontin before; she states she has been on this medication for over a year;  she denies any other acute complaints; states she is pending f/u at [facility] on 3/19 and that that was the earliest appointment she could have. She states she has not been able to see the surgeon or pain management doctor.         Refernce #: [id number] Pt is a 43yoF with h/o spinal surgery at [facility] on 2/21, presenting for medication refill;  Pt states she was here yesterday, saw the doctor and was sent oxycontin 15 ER however the pharmacist reports that the pharmacy only carries oxycoDONE 15mg IR;  Pt states she was on 15mg oxycodone every 6 hours and never took oxycontin before; she states she has been on this medication for over a year;  she denies any other acute complaints; states she is pending f/u at [facility] on 3/19 and that that was the earliest appointment she could have. She states she has not been able to see the surgeon or pain management doctor. She reports that the referrals she was given here for Dr. Andre did not help because he does not accept affinity and that there was no answer with Dr. Ambrosio.         Refernce #: [id number]

## 2019-03-12 NOTE — ED STATDOCS - PROGRESS NOTE DETAILS
Pt advised that she should stay with the same hospital for refills; as prescriptions are monitored , Pt states she was unhappy with the care provided at [facility] since neither of the surgeries seemed to help her.  She was advised that controlled prescription are not typically refilled for chronic pain. I advised pt to f/u with [facility] clinic as they would be able to see her but she states it is too far for her.

## 2019-03-12 NOTE — ED ADULT TRIAGE NOTE - CHIEF COMPLAINT QUOTE
Seen in ER yesterday.  Dr. Moore prescribed the wrong narcotic medication and she cannot fill it at the pharmacy.  S/P back surgery in february 2019.  Per patient, the surgeon does not take her insurance.  Has appt at [facility] clinic scheduled for 3/19/19.

## 2019-03-14 NOTE — ED ADULT NURSE NOTE - NS ED PATIENT SAFETY CONCERN
HPI     CC: depression    Nkechi Melvin is a 50 y.o. female with depression who presents for follow up of depression. States that has had long history of depression, but has worsened over the past year. Hast lost her job about 1.5 years ago with financial stress. No other stressors at home. Previously on Zoloft intermittently since 2005 (which worsened symptoms), and Prozac for about 1 year (not much improvement). Has had passive suicidal thoughts, but no active plans because of her family. Has been off all medication for about 1 year. GAD7 score of 17. PHQ9 score of 24; with passive SI, but no HI or active SI     PMHx - Reviewed  Past Medical History:   Diagnosis Date    Abnormal mammogram 08/05/2016    MMG birads 3/cysts. - 6 month follow up needed    Abnormal mammogram of left breast 02/08/2018; 2/20/18    Left breast retroareolar focal asymmetry; normal, rec 6 month f/u    Abnormal Pap smear     Depressive disorder     History of bladder infections     History of mammogram 07/28/15    Small neodensity right breast for which diagnostic mammogram recommended. Hammad    PROM @ 29 wks-2#;? abruption;breech    Pap smear for cervical cancer screening 7/17/2010; 7/28/15; 6/11/18    normal; Negative; neg, hpv neg       Meds - Reviewed  Current Outpatient Medications   Medication Sig Dispense Refill    gabapentin (NEURONTIN) 300 mg capsule gabapentin 300 mg capsule   Take 1 capsule 4 times a day by oral route after meals for 90 days.  baclofen (LIORESAL) 10 mg tablet TAKE 1 TABLET BY MOUTH TWICE A DAY AS NEEDED  1    HYDROcodone-acetaminophen (NORCO) 5-325 mg per tablet TAKE 1/2 TABLET TWICE A DAY AS NEEDED FOR PAIN  0    ethinyl estradiol-etonogestrel (NUVARING) 0.12-0.015 mg/24 hr vaginal ring Insert vaginally for 3 weeks;remove for 1 week.  3 Device 4       Allergies - Reviewed  No Known Allergies    Smoker - Reviewed  Social History     Tobacco Use   Smoking Status Never Smoker   Smokeless Tobacco Never Used       ETOH - Reviewed  Social History     Substance and Sexual Activity   Alcohol Use No       FH - Reviewed  Family History   Problem Relation Age of Onset    No Known Problems Mother     No Known Problems Father        ROS:  Review of Systems   Constitutional: Negative. Negative for activity change, appetite change, diaphoresis and unexpected weight change. Respiratory: Negative for chest tightness and shortness of breath. Cardiovascular: Negative for chest pain and palpitations. Psychiatric/Behavioral: Positive for decreased concentration, dysphoric mood, sleep disturbance and suicidal ideas. Negative for agitation, behavioral problems, confusion, hallucinations and self-injury. The patient is nervous/anxious. The patient is not hyperactive. Physical Exam:  Visit Vitals  /70 (BP 1 Location: Left arm, BP Patient Position: Sitting)   Pulse 64   Temp 98.3 °F (36.8 °C) (Oral)   Resp 18   Ht 5' 3\" (1.6 m)   Wt 160 lb 3.2 oz (72.7 kg)   SpO2 96%   BMI 28.38 kg/m²       Wt Readings from Last 3 Encounters:   03/14/19 160 lb 3.2 oz (72.7 kg)   01/28/19 156 lb (70.8 kg)   01/28/19 160 lb (72.6 kg)     BP Readings from Last 3 Encounters:   03/14/19 114/70   01/28/19 119/87   01/28/19 147/81        Physical Exam   Constitutional: She is oriented to person, place, and time. She appears well-developed and well-nourished. No distress. HENT:   Head: Normocephalic and atraumatic. Mouth/Throat: Oropharynx is clear and moist.   Eyes: Conjunctivae are normal. No scleral icterus. Cardiovascular: Normal rate and regular rhythm. Pulmonary/Chest: Effort normal and breath sounds normal. No respiratory distress. She has no wheezes. Neurological: She is alert and oriented to person, place, and time. She exhibits normal muscle tone. Coordination normal.   Skin: Skin is warm and dry. Capillary refill takes less than 2 seconds. She is not diaphoretic. Psychiatric: She has a normal mood and affect. Her behavior is normal. Judgment and thought content normal.   Nursing note and vitals reviewed. Assessment     50 y.o. female presents with depression and anxiety:  Patient Active Problem List   Diagnosis Code    Moderate episode of recurrent major depressive disorder ([facility] [address] F33.1    Anxiety F41.9       Today's diagnoses are:    ICD-10-CM ICD-9-CM    1. Primary insomnia F51.01 307.42    2. Moderate episode of recurrent major depressive disorder ([facility]) F33.1 296.32 venlafaxine-SR (EFFEXOR XR) 75 mg capsule      REFERRAL TO PSYCHIATRY   3. Anxiety F41.9 300.00 BEHAV ASSMT W/SCORE & DOCD/STAND INSTRUMENT              Plan     1. Depression, Anxiety, Insomnia  - trial of Effexor 75 mg daily  - referral to psychiatry  - return in 2-3 weeks for follow up   - discussed with patient regarding SI, who states she has no active plans and would not act on it because of her family      Follow up as needed     Prior labs and imaging were reviewed. I have discussed the diagnosis with the patient and the intended plan as seen in the above orders. The patient has received an after-visit summary and questions were answered concerning future plans. I have discussed medication side effects and warnings with the patient as well. Patient discussed with Dr. Awilda Aguilar, Attending Physician.     Alyse Herrera MD, PGY3  Family Medicine Resident No

## 2019-03-29 ENCOUNTER — APPOINTMENT (OUTPATIENT)
Dept: INTERNAL MEDICINE | Facility: CLINIC | Age: 44
End: 2019-03-29
Payer: MEDICAID

## 2019-03-29 VITALS
SYSTOLIC BLOOD PRESSURE: 115 MMHG | TEMPERATURE: 98.7 F | HEIGHT: 63 IN | DIASTOLIC BLOOD PRESSURE: 72 MMHG | BODY MASS INDEX: 37.21 KG/M2 | WEIGHT: 210 LBS | HEART RATE: 73 BPM

## 2019-03-29 DIAGNOSIS — Z86.19 PERSONAL HISTORY OF OTHER INFECTIOUS AND PARASITIC DISEASES: ICD-10-CM

## 2019-03-29 PROCEDURE — 99214 OFFICE O/P EST MOD 30 MIN: CPT

## 2019-03-29 RX ORDER — FLUCONAZOLE 150 MG/1
150 TABLET ORAL
Qty: 1 | Refills: 0 | Status: ACTIVE | COMMUNITY
Start: 2019-03-29 | End: 1900-01-01

## 2019-04-18 ENCOUNTER — CLINICAL ADVICE (OUTPATIENT)
Age: 44
End: 2019-04-18

## 2019-11-04 ENCOUNTER — EMERGENCY (EMERGENCY)
Facility: HOSPITAL | Age: 44
LOS: 1 days | Discharge: HOME | End: 2019-11-04
Attending: EMERGENCY MEDICINE
Payer: COMMERCIAL

## 2019-11-04 VITALS
OXYGEN SATURATION: 100 % | DIASTOLIC BLOOD PRESSURE: 100 MMHG | TEMPERATURE: 99 F | WEIGHT: 220.02 LBS | RESPIRATION RATE: 20 BRPM | HEART RATE: 76 BPM | HEIGHT: 63 IN | SYSTOLIC BLOOD PRESSURE: 175 MMHG

## 2019-11-04 DIAGNOSIS — Z90.49 ACQUIRED ABSENCE OF OTHER SPECIFIED PARTS OF DIGESTIVE TRACT: Chronic | ICD-10-CM

## 2019-11-04 DIAGNOSIS — Z98.890 OTHER SPECIFIED POSTPROCEDURAL STATES: Chronic | ICD-10-CM

## 2019-11-04 PROCEDURE — 99283 EMERGENCY DEPT VISIT LOW MDM: CPT

## 2019-11-04 RX ORDER — OXYCODONE HYDROCHLORIDE 5 MG/1
10 TABLET ORAL ONCE
Refills: 0 | Status: DISCONTINUED | OUTPATIENT
Start: 2019-11-04 | End: 2019-11-04

## 2019-11-04 RX ORDER — OXYCODONE HYDROCHLORIDE 5 MG/1
1 TABLET ORAL
Qty: 8 | Refills: 0
Start: 2019-11-04 | End: 2019-11-05

## 2019-11-04 RX ADMIN — OXYCODONE HYDROCHLORIDE 10 MILLIGRAM(S): 5 TABLET ORAL at 21:02

## 2019-11-04 NOTE — ED PROVIDER NOTE - CARE PLAN
Principal Discharge DX:	Chronic low back pain, unspecified back pain laterality, unspecified whether sciatica present

## 2019-11-04 NOTE — ED PROVIDER NOTE - PATIENT PORTAL LINK FT
You can access the FollowMyHealth Patient Portal offered by [facility] by registering at the following website: [email or web address]. By joining [employer]’s FollowMyHealth portal, you will also be able to view your health information using other applications (apps) compatible with our system.

## 2019-11-04 NOTE — ED PROVIDER NOTE - ATTENDING CONTRIBUTION TO CARE
I, Aden Weeks, performed a face to face bedside interview with this patient regarding history of present illness, review of symptoms and relevant past medical, social and family history.  I completed an independent physical examination. I have communicated the patient’s plan of care and disposition with the ACP.      43 yo female PMHx chronic back pain s/p surgery 1/2019, 2/2019, caudia equina, spondylosis, herniated discs, presents to ED c/o back pain. Patient states normally prescribed 10mg oxycodone every 6 hours for pain, but ran out 4 days ago   pe awake alert in nad able to ambulate with walker without assistance;    dx prescription refill; follow up with neurology for meds;

## 2019-11-04 NOTE — ED ADULT TRIAGE NOTE - CHIEF COMPLAINT QUOTE
back pain from 2 back surgeries at [facility]. 1/19 surgery, 2/2019, 3/2019 surgery. Still has pain. stenosis.

## 2019-11-04 NOTE — ED PROVIDER NOTE - OBJECTIVE STATEMENT
back pain. surgery 1/2019, 2/2019. has known caudia equina, spondylosis. herniated disc, "messed up," in february then got infection MRSA requring pICC line. [place] does not take insurnace, has been following with neurologist, but is out this week. want to do more surgery, but will not let them. oxycodone 10mg, if goes to PT takes 15mg, immediate release. m/w/f. did not go today because ran out of medication. pain is unchanged from baseline. needs pain medicine. neurologist is not in this week. bp elevated when off pain medicaiton. last had saturdya morning. pinched nevere in cerlvical, spine, nerve damage. had imaging done in september at [facility]. walks with walker. has bowel and bladder incontinece at baseline. states not supposed to take ibuprofen or acetaminophen but has been. has numbness to elg at baseline  [place]: Unknown  Neuro: Annabel 43 yo female PMHx chronic back pain s/p surgery 1/2019, 2/2019, caudia equina, spondylosis, herniated discs, presents to ED c/o back pain. Patient states normally prescribed 10mg oxycodone every 6 hours for pain, but ran out 4 days ago and has been without since. Takes oxycodone 15mg prior to PT on M/W/F, but was unable to attend today because without meds. Patient prescribed medications by neurologist, but is not in office this week. Surgery done at [facility], but has not followed up with 2/2 insurance. At baseline, patient has bowel had bladder incontinence as well as numbness to left leg. Last imaging done in 9/2019 at [facility], but does not know what. Ambulates with walker. No further complaints at this time.   [place]: Unknown  Neuro: Annabel

## 2019-11-04 NOTE — ED PROVIDER NOTE - PHYSICAL EXAMINATION
ambulates with walker  left ankle inverted Msk: Decreased strength b/l lower extremities.  Neuro: Decreased sensation to LLE.

## 2019-11-04 NOTE — ED PROVIDER NOTE - CLINICAL SUMMARY MEDICAL DECISION MAKING FREE TEXT BOX
no new neruological findings. will give meds and must follow up with neurologist. 45 yo female PMHx chronic back pain s/p surgery 1/2019, 2/2019, caudia equina, spondylosis, herniated discs, presents to ED c/o back pain. At baseline patient with bowel/bladder incontinence, left ankle inversion which patient is able to ambulate with assistance of walker. NO NEW exam findings, patient at baseline. Requesting prescription refill. iSTOP verified. Discussed with patient only able to give 2 days worth of medication. Encouraged follow up with neurologist and surgeon. Patient understands and agrees with plan.

## 2019-11-04 NOTE — ED PROVIDER NOTE - CONSTITUTIONAL, MLM
normal... Well appearing, well nourished, awake, alert, oriented to person, place, time/situation and in no apparent distress. Ambulating with walker.

## 2019-11-04 NOTE — ED PROVIDER NOTE - PROGRESS NOTE DETAILS
JACE Barlow: iSTOP Reference #: [id number]. Patient last prescribed 30 day supply on 9/16/19 dispensed 9/18/19 by Dr. Jin.

## 2019-11-04 NOTE — ED PROVIDER NOTE - PMH
Chronic low back pain, unspecified back pain laterality, unspecified whether sciatica present    Dental infection    Diabetes mellitus    Hypertension, unspecified type    Sciatica

## 2019-11-19 NOTE — ED STATDOCS - NEUROLOGICAL, MLM
Spoke with pt.   Cough much better . No CP / SOB.   However has more nasal congestion with thick rhinorrhea , some facial pain last 1 week. Wll treat with Augmentin x 10 d. advised increasing fluid intake, taking a probiotic separate from the antibiotic.  Patient understands to follow up if she does not begin to feel better in the next couple days sooner with concerns or new symptoms.  No further questions.   sensation is normal and strength is normal.

## 2019-12-18 NOTE — ED ADULT NURSE NOTE - NS PRO PASSIVE SMOKE EXP
FAMILY HISTORY:  Family history of cancer  Family history of lung cancer  Family history of premature CAD
No

## 2020-01-09 PROBLEM — M54.5 LOW BACK PAIN: Chronic | Status: ACTIVE | Noted: 2019-11-05

## 2020-01-10 ENCOUNTER — APPOINTMENT (OUTPATIENT)
Dept: FAMILY MEDICINE | Facility: CLINIC | Age: 45
End: 2020-01-10
Payer: MEDICAID

## 2020-01-10 VITALS
HEIGHT: 63 IN | WEIGHT: 220 LBS | TEMPERATURE: 98.2 F | OXYGEN SATURATION: 99 % | RESPIRATION RATE: 14 BRPM | DIASTOLIC BLOOD PRESSURE: 100 MMHG | SYSTOLIC BLOOD PRESSURE: 178 MMHG | BODY MASS INDEX: 38.98 KG/M2 | HEART RATE: 72 BPM

## 2020-01-10 DIAGNOSIS — E66.9 OBESITY, UNSPECIFIED: ICD-10-CM

## 2020-01-10 DIAGNOSIS — Z00.00 ENCOUNTER FOR GENERAL ADULT MEDICAL EXAMINATION W/OUT ABNORMAL FINDINGS: ICD-10-CM

## 2020-01-10 DIAGNOSIS — Z56.0 UNEMPLOYMENT, UNSPECIFIED: ICD-10-CM

## 2020-01-10 PROCEDURE — 99406 BEHAV CHNG SMOKING 3-10 MIN: CPT

## 2020-01-10 PROCEDURE — 99204 OFFICE O/P NEW MOD 45 MIN: CPT | Mod: 25

## 2020-01-10 PROCEDURE — G0447 BEHAVIOR COUNSEL OBESITY 15M: CPT | Mod: 59

## 2020-01-10 SDOH — ECONOMIC STABILITY - INCOME SECURITY: UNEMPLOYMENT, UNSPECIFIED: Z56.0

## 2020-01-24 PROBLEM — E66.9 OBESITY (BMI 30-39.9): Status: ACTIVE | Noted: 2018-11-13

## 2020-01-24 PROBLEM — Z00.00 ENCOUNTER FOR PREVENTIVE HEALTH EXAMINATION: Status: ACTIVE | Noted: 2018-04-26

## 2020-01-24 NOTE — PAST MEDICAL HISTORY
[Full Term ___] : Full Term: [unfilled] [Total Preg ___] : G[unfilled] [Live Births ___] : P[unfilled]  [Premature ___] : Premature: [unfilled] [Living ___] : Living: [unfilled] [de-identified] : hysterectomy

## 2020-01-24 NOTE — HISTORY OF PRESENT ILLNESS
[FreeTextEntry1] : Physical exam  [de-identified] : 44 y.o female with PMHx of HTN, DM, Carpal tunnel syndrome, Rheumatoid arthritis,spondilosis, liver disease, kidney damage.  Here today accompanied by daughter for Physical exam and meds refills.  reported having chronic pain in lower back, knees.  Having difficulties to walk/stand/sit for long.  taking medications with little effect.  Denies cp, palpitation, sob, dizziness, edema, N/V.

## 2020-01-24 NOTE — PHYSICAL EXAM
[Soft] : abdomen soft [Normal] : normal rate, regular rhythm, normal S1 and S2 and no murmur heard [Non Tender] : non-tender [Normal Bowel Sounds] : normal bowel sounds [de-identified] : Obese [de-identified] : refused foot exam [de-identified] : limited ROM bilat lower extremities, right ankle deformities, turned iinward

## 2020-01-24 NOTE — HEALTH RISK ASSESSMENT
[Intercurrent ED visits] : went to ED [26-29] : 26-06 [] : Yes [No] : In the past 12 months have you used drugs other than those required for medical reasons? No [Two or more falls in past year] : Patient reported two or more falls in the past year [0] : 1) Little interest or pleasure doing things: Not at all (0) [Assistive Device] : Patient uses an assistive device [Transportation] : transportation [Financial] : financial [With Family] : lives with family [# of Members in Household ___] :  household currently consist of [unfilled] member(s) [Unemployed] : unemployed [High School] : high school [Single] : single [# Of Children ___] : has [unfilled] children [Feels Safe at Home] : Feels safe at home [Some assistance needed] : walking [Fully functional (using the telephone, shopping, preparing meals, housekeeping, doing laundry, using] : Fully functional and needs no help or supervision to perform IADLs (using the telephone, shopping, preparing meals, housekeeping, doing laundry, using transportation, managing medications and managing finances) [Reports changes in hearing] : Reports changes in hearing [Smoke Detector] : smoke detector [Carbon Monoxide Detector] : carbon monoxide detector [Safety elements used in home] : safety elements used in home [Seat Belt] :  uses seat belt [With Patient/Caregiver] : With Patient/Caregiver [Fair] :  ~his/her~ mood as fair [de-identified] : hospitalized at [facility] last month for back pain [de-identified] : no [de-identified] : no [de-identified] : use walker [Change in mental status noted] : No change in mental status noted [Language] : denies difficulty with language [Behavior] : denies difficulty with behavior [Learning/Retaining New Information] : denies difficulty learning/retaining new information [Reasoning] : denies difficulty with reasoning [Handling Complex Tasks] : denies difficulty handling complex tasks [Sexually Active] : not sexually active [Spatial Ability and Orientation] : denies difficulty with spatial ability and orientation [High Risk Behavior] : no high risk behavior [Reports changes in vision] : Reports no changes in vision [Reports changes in dental health] : Reports no changes in dental health [Guns at Home] : no guns at home [Sunscreen] : does not use sunscreen [Travel to Developing Areas] : does not  travel to developing areas [Caregiver Concerns] : does not have caregiver concerns [TB Exposure] : is not being exposed to tuberculosis [PapSmearComments] : hysterectomy more than 10 years [HIVDate] : 2019 [HIVComments] : negative [de-identified] : Daugther helps with dressing, transferring [FreeTextEntry2] : daughter helps [FreeTextEntry1] : daughter helps  [de-identified] : decrease hearing [FreeTextEntry8] : use walker [AdvancecareDate] : 1/9/2020

## 2020-01-24 NOTE — REVIEW OF SYSTEMS
[Hot Flashes] : hot flashes [Heartburn] : heartburn [Incontinence] : incontinence [Anxiety] : anxiety [Depression] : depression [Negative] : Respiratory [Joint Pain] : joint pain [Muscle Pain] : muscle pain [Back Pain] : back pain [Unsteady Walking] : ataxia [Fever] : no fever [Night Sweats] : no night sweats [Pain] : no pain [Discharge] : no discharge [Redness] : no redness [Dryness] : no dryness  [Vision Problems] : no vision problems [Itching] : no itching [Hearing Loss] : no hearing loss [Earache] : no earache [Nosebleed] : no nosebleeds [Hoarseness] : no hoarseness [Nasal Discharge] : no nasal discharge [Sore Throat] : no sore throat [Chest Pain] : no chest pain [Postnasal Drip] : no postnasal drip [Palpitations] : no palpitations [Leg Claudication] : no leg claudication [Lower Ext Edema] : no lower extremity edema [Orthopnea] : no orthopnea [Shortness Of Breath] : no shortness of breath [Paroxysmal Nocturnal Dyspnea] : no paroxysmal nocturnal dyspnea [Cough] : no cough [Wheezing] : no wheezing [Dyspnea on Exertion] : no dyspnea on exertion [Abdominal Pain] : no abdominal pain [Nausea] : no nausea [Constipation] : no constipation [Diarrhea] : diarrhea [Vomiting] : no vomiting [Melena] : no melena [Dysuria] : no dysuria [Nocturia] : no nocturia [Hematuria] : no hematuria [Poor Libido] : libido not poor [Frequency] : no frequency [Vaginal Discharge] : no vaginal discharge [Dysmenorrhea] : no dysmenorrhea [Joint Stiffness] : no joint stiffness [Joint Swelling] : no joint swelling [Muscle Weakness] : no muscle weakness [Itching] : no itching [Mole Changes] : no mole changes [Nail Changes] : no nail changes [Hair Changes] : no hair changes [Skin Rash] : no skin rash [Headache] : no headache [Dizziness] : no dizziness [Confusion] : no confusion [Fainting] : no fainting [Memory Loss] : no memory loss [Suicidal] : not suicidal [Easy Bleeding] : no easy bleeding [Easy Bruising] : no easy bruising [Swollen Glands] : no swollen glands [FreeTextEntry5] : heart murmur [FreeTextEntry4] : decrease hearing [de-identified] : PTSD [FreeTextEntry7] : incontinence of stool

## 2020-01-24 NOTE — COUNSELING
[Cessation strategies including cessation program discussed] : Cessation strategies including cessation program discussed [Risk of tobacco use and health benefits of smoking cessation discussed] : Risk of tobacco use and health benefits of smoking cessation discussed [Use of nicotine replacement therapies and other medications discussed] : Use of nicotine replacement therapies and other medications discussed [Encouraged to pick a quit date and identify support needed to quit] : Encouraged to pick a quit date and identify support needed to quit [Tobacco Use Cessation Intermediate Greater Than 3 Minutes Up to 10 Minutes] : Tobacco Use Cessation Intermediate Greater Than 3 Minutes Up to 10 Minutes [Potential consequences of obesity discussed] : Potential consequences of obesity discussed [Benefits of weight loss discussed] : Benefits of weight loss discussed [Structured Weight Management Program suggested:] : Structured weight management program suggested [Encouraged to maintain food diary] : Encouraged to maintain food diary [Encouraged to increase physical activity] : Encouraged to increase physical activity [Encouraged to use exercise tracking device] : Encouraged to use exercise tracking device [Target Wt Loss Goal ___] : Weight Loss Goals: Target weight loss goal [unfilled] lbs [Weigh Self Weekly] : weigh self weekly [____ min/wk Activity] : [unfilled] min/wk activity [Decrease Portions] : decrease portions [Keep Food Diary] : keep food diary [Transportation Issues] : Transportation issues [Financial issues] : Financial issues [Patient motivation] : Patient motivation [Good understanding] : Patient has a good understanding of lifestyle changes and steps needed to achieve self management goal [FreeTextEntry1] : 5 [FreeTextEntry4] : 15

## 2020-02-06 ENCOUNTER — EMERGENCY (EMERGENCY)
Facility: HOSPITAL | Age: 45
LOS: 1 days | Discharge: HOME | End: 2020-02-06
Attending: STUDENT IN AN ORGANIZED HEALTH CARE EDUCATION/TRAINING PROGRAM
Payer: COMMERCIAL

## 2020-02-06 VITALS
WEIGHT: 240.08 LBS | SYSTOLIC BLOOD PRESSURE: 143 MMHG | OXYGEN SATURATION: 98 % | TEMPERATURE: 99 F | HEART RATE: 74 BPM | RESPIRATION RATE: 18 BRPM | DIASTOLIC BLOOD PRESSURE: 81 MMHG | HEIGHT: 63 IN

## 2020-02-06 DIAGNOSIS — Z98.890 OTHER SPECIFIED POSTPROCEDURAL STATES: Chronic | ICD-10-CM

## 2020-02-06 DIAGNOSIS — Z90.49 ACQUIRED ABSENCE OF OTHER SPECIFIED PARTS OF DIGESTIVE TRACT: Chronic | ICD-10-CM

## 2020-02-06 LAB
ALBUMIN SERPL ELPH-MCNC: 4.1 G/DL — SIGNIFICANT CHANGE UP (ref 3.3–5.2)
ALP SERPL-CCNC: 74 U/L — SIGNIFICANT CHANGE UP (ref 40–120)
ALT FLD-CCNC: 6 U/L — SIGNIFICANT CHANGE UP
ANION GAP SERPL CALC-SCNC: 10 MMOL/L — SIGNIFICANT CHANGE UP (ref 5–17)
APTT BLD: 37.4 SEC — HIGH (ref 27.5–36.3)
AST SERPL-CCNC: 16 U/L — SIGNIFICANT CHANGE UP
BASE EXCESS BLDV CALC-SCNC: 2.6 MMOL/L — HIGH (ref -2–2)
BASOPHILS # BLD AUTO: 0.02 K/UL — SIGNIFICANT CHANGE UP (ref 0–0.2)
BASOPHILS NFR BLD AUTO: 0.3 % — SIGNIFICANT CHANGE UP (ref 0–2)
BILIRUB SERPL-MCNC: 0.3 MG/DL — LOW (ref 0.4–2)
BUN SERPL-MCNC: 5 MG/DL — LOW (ref 8–20)
CA-I SERPL-SCNC: 1.07 MMOL/L — LOW (ref 1.15–1.33)
CALCIUM SERPL-MCNC: 9 MG/DL — SIGNIFICANT CHANGE UP (ref 8.6–10.2)
CHLORIDE BLDV-SCNC: 107 MMOL/L — SIGNIFICANT CHANGE UP (ref 98–107)
CHLORIDE SERPL-SCNC: 102 MMOL/L — SIGNIFICANT CHANGE UP (ref 98–107)
CK SERPL-CCNC: 131 U/L — SIGNIFICANT CHANGE UP (ref 25–170)
CO2 SERPL-SCNC: 25 MMOL/L — SIGNIFICANT CHANGE UP (ref 22–29)
CREAT SERPL-MCNC: 0.65 MG/DL — SIGNIFICANT CHANGE UP (ref 0.5–1.3)
EOSINOPHIL # BLD AUTO: 0.05 K/UL — SIGNIFICANT CHANGE UP (ref 0–0.5)
EOSINOPHIL NFR BLD AUTO: 0.7 % — SIGNIFICANT CHANGE UP (ref 0–6)
GAS PNL BLDV: 143 MMOL/L — SIGNIFICANT CHANGE UP (ref 135–145)
GAS PNL BLDV: SIGNIFICANT CHANGE UP
GAS PNL BLDV: SIGNIFICANT CHANGE UP
GLUCOSE BLDV-MCNC: 109 MG/DL — HIGH (ref 70–99)
GLUCOSE SERPL-MCNC: 106 MG/DL — HIGH (ref 70–99)
HCO3 BLDV-SCNC: 27 MMOL/L — SIGNIFICANT CHANGE UP (ref 21–29)
HCT VFR BLD CALC: 40.3 % — SIGNIFICANT CHANGE UP (ref 34.5–45)
HCT VFR BLDA CALC: 43 — SIGNIFICANT CHANGE UP (ref 39–50)
HGB BLD CALC-MCNC: 14 G/DL — SIGNIFICANT CHANGE UP (ref 11.5–15.5)
HGB BLD-MCNC: 13.4 G/DL — SIGNIFICANT CHANGE UP (ref 11.5–15.5)
IMM GRANULOCYTES NFR BLD AUTO: 0.3 % — SIGNIFICANT CHANGE UP (ref 0–1.5)
INR BLD: 1.06 RATIO — SIGNIFICANT CHANGE UP (ref 0.88–1.16)
LACTATE BLDV-MCNC: 1 MMOL/L — SIGNIFICANT CHANGE UP (ref 0.5–2)
LYMPHOCYTES # BLD AUTO: 2.18 K/UL — SIGNIFICANT CHANGE UP (ref 1–3.3)
LYMPHOCYTES # BLD AUTO: 30.1 % — SIGNIFICANT CHANGE UP (ref 13–44)
MAGNESIUM SERPL-MCNC: 1.7 MG/DL — SIGNIFICANT CHANGE UP (ref 1.6–2.6)
MCHC RBC-ENTMCNC: 30.1 PG — SIGNIFICANT CHANGE UP (ref 27–34)
MCHC RBC-ENTMCNC: 33.3 GM/DL — SIGNIFICANT CHANGE UP (ref 32–36)
MCV RBC AUTO: 90.6 FL — SIGNIFICANT CHANGE UP (ref 80–100)
MONOCYTES # BLD AUTO: 0.48 K/UL — SIGNIFICANT CHANGE UP (ref 0–0.9)
MONOCYTES NFR BLD AUTO: 6.6 % — SIGNIFICANT CHANGE UP (ref 2–14)
NEUTROPHILS # BLD AUTO: 4.49 K/UL — SIGNIFICANT CHANGE UP (ref 1.8–7.4)
NEUTROPHILS NFR BLD AUTO: 62 % — SIGNIFICANT CHANGE UP (ref 43–77)
OTHER CELLS CSF MANUAL: 17 ML/DL — LOW (ref 18–22)
PCO2 BLDV: 41 MMHG — SIGNIFICANT CHANGE UP (ref 35–50)
PH BLDV: 7.43 — SIGNIFICANT CHANGE UP (ref 7.32–7.43)
PLATELET # BLD AUTO: 274 K/UL — SIGNIFICANT CHANGE UP (ref 150–400)
PO2 BLDV: 78 MMHG — HIGH (ref 25–45)
POTASSIUM BLDV-SCNC: 3.2 MMOL/L — LOW (ref 3.4–4.5)
POTASSIUM SERPL-MCNC: 3.2 MMOL/L — LOW (ref 3.5–5.3)
POTASSIUM SERPL-SCNC: 3.2 MMOL/L — LOW (ref 3.5–5.3)
PROT SERPL-MCNC: 7.4 G/DL — SIGNIFICANT CHANGE UP (ref 6.6–8.7)
PROTHROM AB SERPL-ACNC: 12 SEC — SIGNIFICANT CHANGE UP (ref 10–12.9)
RBC # BLD: 4.45 M/UL — SIGNIFICANT CHANGE UP (ref 3.8–5.2)
RBC # FLD: 15.5 % — HIGH (ref 10.3–14.5)
SAO2 % BLDV: 97 % — SIGNIFICANT CHANGE UP
SODIUM SERPL-SCNC: 137 MMOL/L — SIGNIFICANT CHANGE UP (ref 135–145)
WBC # BLD: 7.24 K/UL — SIGNIFICANT CHANGE UP (ref 3.8–10.5)
WBC # FLD AUTO: 7.24 K/UL — SIGNIFICANT CHANGE UP (ref 3.8–10.5)

## 2020-02-06 PROCEDURE — 93010 ELECTROCARDIOGRAM REPORT: CPT

## 2020-02-06 PROCEDURE — 93971 EXTREMITY STUDY: CPT | Mod: 26,LT

## 2020-02-06 PROCEDURE — 99285 EMERGENCY DEPT VISIT HI MDM: CPT

## 2020-02-06 RX ORDER — OXYCODONE HYDROCHLORIDE 5 MG/1
10 TABLET ORAL ONCE
Refills: 0 | Status: DISCONTINUED | OUTPATIENT
Start: 2020-02-06 | End: 2020-02-06

## 2020-02-06 RX ORDER — DEXAMETHASONE 0.5 MG/5ML
10 ELIXIR ORAL ONCE
Refills: 0 | Status: COMPLETED | OUTPATIENT
Start: 2020-02-06 | End: 2020-02-06

## 2020-02-06 RX ORDER — POTASSIUM CHLORIDE 20 MEQ
40 PACKET (EA) ORAL ONCE
Refills: 0 | Status: COMPLETED | OUTPATIENT
Start: 2020-02-06 | End: 2020-02-06

## 2020-02-06 RX ORDER — MORPHINE SULFATE 50 MG/1
8 CAPSULE, EXTENDED RELEASE ORAL ONCE
Refills: 0 | Status: DISCONTINUED | OUTPATIENT
Start: 2020-02-06 | End: 2020-02-06

## 2020-02-06 RX ORDER — POTASSIUM CHLORIDE 20 MEQ
10 PACKET (EA) ORAL
Refills: 0 | Status: DISCONTINUED | OUTPATIENT
Start: 2020-02-06 | End: 2020-02-07

## 2020-02-06 RX ADMIN — OXYCODONE HYDROCHLORIDE 10 MILLIGRAM(S): 5 TABLET ORAL at 23:33

## 2020-02-06 RX ADMIN — Medication 102 MILLIGRAM(S): at 22:38

## 2020-02-06 NOTE — ED ADULT NURSE NOTE - PMH
Assessment  1  Burning sensation in lower extremity (782 0) (R20 8)    Plan  Burning sensation in lower extremity    · VAS LOWER LIMB VENOUS DUPLEX STUDY, UNILATERAL/LIMITED; Unilateral Side:Left;Status:Hold For - Scheduling; Requested for:49Dks9674;     Discussion/Summary    Burning pain on leg/left- Pt mentioned and is concerned about a blood clot but very low suspicion for this  We will order a Doppler as a precaution but pt need not go for it tonight, which would require an ER visit at this hour  Likely related to varicosities  Wear compression stockings  Try to sit down periodically at work  You may use Ibuprofen prn  Take with food  Possible side effects of new medications were reviewed with the patient/guardian today  The treatment plan was reviewed with the patient/guardian  The patient/guardian understands and agrees with the treatment plan      Chief Complaint  hot left leg      History of Present Illness  HPI: Pt comes in with a c/o of her left leg feeling hot  No rash, redness, edema noted  She felt it on Friday (3-4 days ago)  She said it felt like she backed up against a hot stove  She's felt this before about a year ago and it went away on it's own  Pt says she thinks it's varicose veins  Her mom had them and had a similar sensation  She works 3 days a week in a [employer] She is on her feet for 5 hours straight on these days and it's when it bothers her the most  It usually will last about a week, based on the last episode  No recent travel by car or plane  No CP, SOB, palps  She denies any tingling feeling or sharp/stabbing pain  Review of Systems   Constitutional: No fever, no chills, feels well, no tiredness, no recent weight gain or loss  Cardiovascular: no complaints of slow or fast heart rate, no chest pain, no palpitations, no leg claudication or lower extremity edema  Respiratory: no complaints of shortness of breath, no wheezing, no dyspnea on exertion, no orthopnea or PND  Gastrointestinal: no complaints of abdominal pain, no constipation, no nausea or diarrhea, no vomiting, no bloody stools  Musculoskeletal: limb pain, but-- as noted in HPI-- and-- no limb swelling  Integumentary: no complaints of skin rash or lesion, no itching or dry skin, no skin wounds  ROS reviewed  Active Problems  1  Active advance directive (V49 89) (Z78 9)   2  Acute left flank pain (789 09,338 19) (R10 9)   3  Acute left-sided thoracic back pain (724 1) (M54 6)   4  Asymptomatic postmenopausal state (V49 81) (Z78 0)   5  Bruit (785 9) (R09 89)   6  Burning sensation in lower extremity (782 0) (R20 8)   7  Candida vaginitis (112 1) (B37 3)   8  Chronic combined systolic and diastolic heart failure (800 45) (I50 42)   9  Dense breasts (793 82) (R92 2)   10  Dizziness, nonspecific (780 4) (R42)   11  Encounter for gynecological examination with abnormal finding (V72 31) (Z01 411)   12  Encounter for screening mammogram for malignant neoplasm of breast (V76 12)  (Z12 31)   13  Essential hypertension (401 9) (I10)   14  Falls (E888 9) (W19 XXXA)   15  Flu vaccine need (V04 81) (Z23)   16  Functional urinary incontinence (788 91) (R39 81)   17  Hyperlipidemia (272 4) (E78 5)   18  Hypertensive left ventricular hypertrophy, without heart failure (402 90) (I11 9)   19  Hypothyroidism (244 9) (E03 9)   20  Joint Pain In Both Knees (719 46)   21  Mild concentric left ventricular hypertrophy (LVH) (429 3) (I51 7)   22  Nonrheumatic aortic valve insufficiency (424 1) (I35 1)   23  Screening for genitourinary condition (V81 6) (Z13 89)   24  Screening for skin condition (V82 0) (Z13 89)   25  Seborrheic keratosis (702 19) (L82 1)   26  Syncope, unspecified syncope type (780 2) (R55)   27  Vaginitis, atrophic (627 3) (N95 2)    Past Medical History  Active Problems And Past Medical History Reviewed: The active problems and past medical history were reviewed and updated today        Surgical History  Surgical History Reviewed: The surgical history was reviewed and updated today  Social History   · Active advance directive (V49 89) (Z78 9)   · Drinks coffee   · Living Independently Alone (V60 3)   · Marital History -    · Never A Smoker   · Never smoked tobacco (V49 89) (Z78 9)   · Never Used Drugs   · Wine Consumption (___ Glasses/Day)  The social history was reviewed and updated today  Family History  Family History Reviewed: The family history was reviewed and updated today  Current Meds   1  Atorvastatin Calcium 20 MG Oral Tablet; take 1 tablet by mouth once daily; Therapy: 51Igz4585 to (Nam Jenkins)  Requested for: 91IMS0242; Last Rx:27Nov2017; Status: ACTIVE - Transmit to [facility] Verification Ordered   2  Flaxseed Oil 1000 MG Oral Capsule; take 1 capsule daily; Therapy: 05SNH8678 to Recorded   3  Levothyroxine Sodium 50 MCG Oral Tablet; take 1 tablet by mouth once daily; Therapy: 81Kvy2128 to (Evaluate:06Apr2018)  Requested for: 01Dlw2273; Last Rx:03Nee2036; Status: ACTIVE - Transmit to Pharmacy - Awaiting Verification Ordered   4  Lisinopril 10 MG Oral Tablet; TAKE 1 TABLET DAILY; Therapy: 87MAT4682 to (Neena Eye)  Requested for: 26URA3961; Last Rx:06Oct2017 Ordered   5  Metoprolol Succinate ER 25 MG Oral Tablet Extended Release 24 Hour; TAKE ONE TABLET BY MOUTH ONCE A DAY; Therapy: 04CCI8564 to (Evaluate:72Bft9855)  Requested for: 00Epi0403; Last Rx:88Cso6900 Ordered   6  Multivitamin Gummies Adult CHEW; Therapy: (HRZUPWTW:98HTS2052) to Recorded   7  Omeprazole 20 MG Oral Capsule Delayed Release; TAKE 1 CAPSULE DAILY EVERY MORNING BEFORE BREAKFAST; Therapy: 17IGE7904 to (Evaluate:99Pgy0281)  Requested for: 75ZTM4557; Last Rx:04Oct2017; Status: ACTIVE - Transmit to Pharmacy - Awaiting Verification Ordered   8  Aguirre Fiber Good CHEW; Therapy: (Recorded:04Oct2017) to Recorded   9   Premarin 0 625 MG/GM Vaginal Cream; Insert 0 5 g intravaginally twice per week; Therapy: 63XUB6474 to (Jus Crandall)  Requested for: 19BGK1321; Last Rx:90Cnv3238; Status: ACTIVE - Transmit to [facility] Verification Ordered   10  Premarin 0 625 MG/GM Vaginal Cream; insert 1/2 applicatorful vaginally every week at  bedtime; Therapy: 33YHR4030 to (Evaluate:16Nov2014)  Requested for: 54YPX5299; Last  RU:02VKZ4506 Ordered   11  Terconazole 0 4 % Vaginal Cream (Terazol 7); INSERT 1 APPLICATORFUL  INTRAVAGINALLY AT BEDTIME NIGHTLY; Therapy: 41NTD6035 to (Evaluate:06Jun2017)  Requested for: 56CXA8149; Last  Rx:47Sqm8729; Status: ACTIVE - Transmit to [facility] Verification Ordered   12  Viactiv [phone] FSBB;  Therapy: (Recorded:04Oct2017) to Recorded    The medication list was reviewed and updated today  Allergies  1  Aspirin TABS    Physical Exam   Constitutional  General appearance: No acute distress, well appearing and well nourished  Eyes  Conjunctiva and lids: No swelling, erythema or discharge  Pupils and irises: Equal, round and reactive to light  Ears, Nose, Mouth, and Throat  Oropharynx: Normal with no erythema, edema, exudate or lesions  Pulmonary  Respiratory effort: No increased work of breathing or signs of respiratory distress  Cardiovascular  Examination of extremities for edema and/or varicosities: Abnormal   varicositiess noted on the left  -- varicosities are located on the posterior calf where the pt indicates the sensation  Abdomen  Abdomen: Non-tender, no masses  Lymphatic  Palpation of lymph nodes in neck: No lymphadenopathy  Musculoskeletal  Gait and station: Normal          Future Appointments    Date/Time Provider Specialty Site   11/26/2018 02:45 PM YENI Oh   Dermatology [facility]     Signatures   Electronically signed by : Lisa Zayas [facility]; Dec 18 2017  4:50PM EST                       (Author)    Electronically signed by : YENI Gleason ; Dec 18 2017  5:19PM EST Chronic low back pain, unspecified back pain laterality, unspecified whether sciatica present    Dental infection    Diabetes mellitus    Hypertension, unspecified type    Sciatica

## 2020-02-06 NOTE — ED ADULT NURSE NOTE - NSIMPLEMENTINTERV_GEN_ALL_ED
Implemented All Fall Risk Interventions:  [place] to call system. Call bell, personal items and telephone within reach. Instruct patient to call for assistance. Room bathroom lighting operational. Non-slip footwear when patient is off stretcher. Physically safe environment: no spills, clutter or unnecessary equipment. Stretcher in lowest position, wheels locked, appropriate side rails in place. Provide visual cue, wrist band, yellow gown, etc. Monitor gait and stability. Monitor for mental status changes and reorient to person, place, and time. Review medications for side effects contributing to fall risk. Reinforce activity limits and safety measures with patient and family.

## 2020-02-06 NOTE — ED PROVIDER NOTE - ATTENDING CONTRIBUTION TO CARE
I, Yana Mcgowan, have personally seen and examined this patient. I have fully participated in the care of this patient. I have reviewed all pertinent clinical information, including history, physical exam, plan and the Resident's note and agree except as noted below.

## 2020-02-06 NOTE — ED ADULT TRIAGE NOTE - CHIEF COMPLAINT QUOTE
Pt AOx3 resting comfortably in stretcher, respirations even and unlabored, no apparent distress noted. Pt states c/o bilateral arm pain radiating from neck, with numbness. States hx of neck/back and arm pain/tingling. States "I was told by my neurologist I have a pinched nerve." States has appointment with Dr. Jin from neurology tomorrow, but "the pain was too much." Pt placed in c-collar by EMS per pt request.

## 2020-02-06 NOTE — ED PROVIDER NOTE - NSFOLLOWUPINSTRUCTIONS_ED_ALL_ED_FT
Muscle Cramps and Spasms    Muscle cramps and spasms occur when a muscle or muscles tighten and you have no control over this tightening (involuntary muscle contraction). They are a common problem and can develop in any muscle. The most common place is in the calf muscles of the leg. Muscle cramps and muscle spasms are both involuntary muscle contractions, but there are some differences between the two:     Muscle cramps are painful. They come and go and may last a few seconds to 15 minutes. Muscle cramps are often more forceful and last longer than muscle spasms.  Muscle spasms may or may not be painful. They may also last just a few seconds or much longer.    Certain medical conditions, such as diabetes or Parkinson disease, can make it more likely to develop cramps or spasms. However, cramps or spasms are usually not caused by a serious underlying problem. Common causes include:    Overexertion.  Overuse from repetitive motions, or doing the same thing over and over.  Remaining in a certain position for a long period of time.  Improper preparation, form, or technique while playing a sport or doing an activity.  Dehydration.  Injury.  Side effects of some medicines.  Abnormally low levels of the salts and ions in your blood (electrolytes), especially potassium and calcium. This could happen if you are taking water pills (diuretics) or if you are pregnant.    In many cases, the cause of muscle cramps or spasms is unknown.    Follow these instructions at home:  Stay well hydrated. Drink enough fluid to keep your urine clear or pale yellow.  Try massaging, stretching, and relaxing the affected muscle.  If directed, apply heat to tight or tense muscles as often as told by your health care provider. Use the heat source that your health care provider recommends, such as a moist heat pack or a heating pad.  Place a towel between your skin and the heat source.  Leave the heat on for 20–30 minutes.  Remove the heat if your skin turns bright red. This is especially important if you are unable to feel pain, heat, or cold. You may have a greater risk of getting burned.  If directed, put ice on the affected area. This may help if you are sore or have pain after a cramp or spasm.  Put ice in a plastic bag.  Place a towel between your skin and the bag.  Leave the ice on for 20 minutes, 2–3 times a day.  Take over-the-counter and prescription medicines only as told by your health care provider.  Pay attention to any changes in your symptoms.    Contact a health care provider if:  Your cramps or spasms get more severe or happen more often.  Your cramps or spasms do not improve over time.    ADDITIONAL NOTES AND INSTRUCTIONS    Please follow up with your Primary MD in 24-48 hr.  Seek immediate medical care for any new/worsening signs or symptoms.

## 2020-02-06 NOTE — ED PROVIDER NOTE - PROGRESS NOTE DETAILS
PT seen and reassessed.  Patient symptomatically improved.   ranging neck without difficulty when speakint to her. AAOX3, NAD, VSS.  Discussed test results w/ patient, given copy of results. Patient verbalized understanding of hospital course and outpatient plans, has decisional making capacity.  Will follow-up with Primary care doctor in the next 2 days; patient will call for an appointment. Will return to the ED if there is any worsening of symptoms.  Patient able to ambulate at baseline, is tolerating PO intake

## 2020-02-06 NOTE — ED ADULT NURSE NOTE - OBJECTIVE STATEMENT
PT presents to ED for increasing weakness over last 4 months.  hx of lumbar discectomy 1 year ago with baseline L leg paralysis post surgery.  PT now c/o b/l upper extremity weakness and numbness. Pt barely participated with exam and assessment stating "I can't" "Its weak" "the tingling is worse when I move my arms." IV steroids infusing per orders. Pt refused morphine stating she has "anaphylactic reaction to it." Requesting "Dilaudid or percocet" PT states she is unable to move left leg at all states she "drags it from the hip and walks with a walker"

## 2020-02-06 NOTE — ED PROVIDER NOTE - PATIENT PORTAL LINK FT
You can access the FollowMyHealth Patient Portal offered by [facility] by registering at the following website: [email or web address]. By joining [employer]’s FollowMyHealth portal, you will also be able to view your health information using other applications (apps) compatible with our system.

## 2020-02-06 NOTE — ED PROVIDER NOTE - NS ED ROS FT
ROS: no CP/SOB. no cough. no fever. no n/v/d/c. no abd pain. no rash. no bleeding. no urinary complaints. +weakness. no vision changes. no HA. +neck pain. +extremity swelling. No change in mental status.

## 2020-02-06 NOTE — ED PROVIDER NOTE - CLINICAL SUMMARY MEDICAL DECISION MAKING FREE TEXT BOX
patient with h/o cauda equina with chronic urinary/bowel incontinence and left elg weakness. now with worsening neck pain, weakness in UE and paresthesias and myoclonus. no significant change in LE sx. no trauma but sx concernign for possible central cord syndrome. also with hypokalemia unclear if paralysis from that in past. will check labs with [facility] for stat K. labs. decadron/morphine. urgent MRi -Juan M DO

## 2020-02-06 NOTE — ED PROVIDER NOTE - PHYSICAL EXAMINATION
Gen: NAD, sitting upright avoiding turning neck, AOx3  Head: NCAT  HEENT: PERRL, EOMI, oral mucosa moist, normal conjunctiva, limited ROM neck due to pain in all directions  Lung: CTAB, no respiratory distress  CV: rrr, no murmur, Normal perfusion  Abd: soft, NTND  MSK: +2 Lt LE edema, no visible deformities  Neuro: CN II-XII intact, 3/5 UE strength with myoclonus and hyporeflexia, decreased sensation b/l UE, 4/5 rt LE strength with intact sensation, 0/5 Lt LE strength with decreased sensation  Skin: No rash   Psych: normal affect

## 2020-02-06 NOTE — ED PROVIDER NOTE - OBJECTIVE STATEMENT
43 yo female PMHx spinal surgery 1/2019 and 2/2019 @ [place] for cauda equina, HTN, RA, due to spinal surgery with left leg weakness/numbness uses walker and drags leg at baseline, also with urinary/bowel incontinence since. no surgery/injections since procedure in february. having worsening neck pain for last 4-6 weeks, following with neurologist who says its a pinched nerve, but worsening last 3-4 days, pain worse rt side of neck but b/l and radiates into both arms with tingling/burning and weakness. feels like rt leg also a little weaker than normal but walking at baseline. no fever/chills. no trauma. pain worse with movement of neck. also with h/o 'severe hypokalemia' no GI losses. no change in medications. here due to severity of pain and inability to use her arms due to weakness and 'muscle twitching' +worsening Lt leg swelling over last month, unclear w/u but thinks no dvt but not sure

## 2020-02-07 VITALS
RESPIRATION RATE: 17 BRPM | TEMPERATURE: 99 F | SYSTOLIC BLOOD PRESSURE: 177 MMHG | OXYGEN SATURATION: 98 % | HEART RATE: 58 BPM | DIASTOLIC BLOOD PRESSURE: 93 MMHG

## 2020-02-07 PROCEDURE — 82330 ASSAY OF CALCIUM: CPT

## 2020-02-07 PROCEDURE — 82803 BLOOD GASES ANY COMBINATION: CPT

## 2020-02-07 PROCEDURE — 84295 ASSAY OF SERUM SODIUM: CPT

## 2020-02-07 PROCEDURE — 82550 ASSAY OF CK (CPK): CPT

## 2020-02-07 PROCEDURE — 83735 ASSAY OF MAGNESIUM: CPT

## 2020-02-07 PROCEDURE — 85730 THROMBOPLASTIN TIME PARTIAL: CPT

## 2020-02-07 PROCEDURE — 82435 ASSAY OF BLOOD CHLORIDE: CPT

## 2020-02-07 PROCEDURE — 93971 EXTREMITY STUDY: CPT

## 2020-02-07 PROCEDURE — 85610 PROTHROMBIN TIME: CPT

## 2020-02-07 PROCEDURE — 99284 EMERGENCY DEPT VISIT MOD MDM: CPT | Mod: 25

## 2020-02-07 PROCEDURE — 93005 ELECTROCARDIOGRAM TRACING: CPT

## 2020-02-07 PROCEDURE — 72141 MRI NECK SPINE W/O DYE: CPT

## 2020-02-07 PROCEDURE — 80053 COMPREHEN METABOLIC PANEL: CPT

## 2020-02-07 PROCEDURE — 84484 ASSAY OF TROPONIN QUANT: CPT

## 2020-02-07 PROCEDURE — 36415 COLL VENOUS BLD VENIPUNCTURE: CPT

## 2020-02-07 PROCEDURE — 85014 HEMATOCRIT: CPT

## 2020-02-07 PROCEDURE — 83605 ASSAY OF LACTIC ACID: CPT

## 2020-02-07 PROCEDURE — 82947 ASSAY GLUCOSE BLOOD QUANT: CPT

## 2020-02-07 PROCEDURE — 84132 ASSAY OF SERUM POTASSIUM: CPT

## 2020-02-07 PROCEDURE — 96375 TX/PRO/DX INJ NEW DRUG ADDON: CPT

## 2020-02-07 PROCEDURE — 85027 COMPLETE CBC AUTOMATED: CPT

## 2020-02-07 PROCEDURE — 72141 MRI NECK SPINE W/O DYE: CPT | Mod: 26

## 2020-02-07 PROCEDURE — 96374 THER/PROPH/DIAG INJ IV PUSH: CPT

## 2020-02-07 RX ORDER — DIAZEPAM 5 MG
10 TABLET ORAL ONCE
Refills: 0 | Status: DISCONTINUED | OUTPATIENT
Start: 2020-02-07 | End: 2020-02-07

## 2020-02-07 RX ADMIN — Medication 100 MILLIEQUIVALENT(S): at 01:12

## 2020-02-07 RX ADMIN — Medication 40 MILLIEQUIVALENT(S): at 01:12

## 2020-02-07 NOTE — ED ADULT NURSE REASSESSMENT NOTE - NS ED NURSE REASSESS COMMENT FT1
Received call from MRI tech pt in pain.  PT has 10 minutes left on table. Md Anthony made aware pts pain.  Awaiting orders.

## 2020-02-10 ENCOUNTER — EMERGENCY (EMERGENCY)
Facility: HOSPITAL | Age: 45
LOS: 1 days | Discharge: HOME | End: 2020-02-10
Attending: STUDENT IN AN ORGANIZED HEALTH CARE EDUCATION/TRAINING PROGRAM
Payer: COMMERCIAL

## 2020-02-10 VITALS
SYSTOLIC BLOOD PRESSURE: 174 MMHG | HEIGHT: 63 IN | DIASTOLIC BLOOD PRESSURE: 90 MMHG | TEMPERATURE: 98 F | WEIGHT: 240.08 LBS | RESPIRATION RATE: 20 BRPM | HEART RATE: 90 BPM | OXYGEN SATURATION: 100 %

## 2020-02-10 DIAGNOSIS — Z90.49 ACQUIRED ABSENCE OF OTHER SPECIFIED PARTS OF DIGESTIVE TRACT: Chronic | ICD-10-CM

## 2020-02-10 DIAGNOSIS — Z98.890 OTHER SPECIFIED POSTPROCEDURAL STATES: Chronic | ICD-10-CM

## 2020-02-10 LAB
ALBUMIN SERPL ELPH-MCNC: 4 G/DL — SIGNIFICANT CHANGE UP (ref 3.3–5.2)
ALP SERPL-CCNC: 70 U/L — SIGNIFICANT CHANGE UP (ref 40–120)
ALT FLD-CCNC: 7 U/L — SIGNIFICANT CHANGE UP
ANION GAP SERPL CALC-SCNC: 12 MMOL/L — SIGNIFICANT CHANGE UP (ref 5–17)
APPEARANCE UR: CLEAR — SIGNIFICANT CHANGE UP
AST SERPL-CCNC: 23 U/L — SIGNIFICANT CHANGE UP
BILIRUB SERPL-MCNC: 0.2 MG/DL — LOW (ref 0.4–2)
BILIRUB UR-MCNC: NEGATIVE — SIGNIFICANT CHANGE UP
BUN SERPL-MCNC: 10 MG/DL — SIGNIFICANT CHANGE UP (ref 8–20)
CALCIUM SERPL-MCNC: 9.3 MG/DL — SIGNIFICANT CHANGE UP (ref 8.6–10.2)
CHLORIDE SERPL-SCNC: 102 MMOL/L — SIGNIFICANT CHANGE UP (ref 98–107)
CO2 SERPL-SCNC: 23 MMOL/L — SIGNIFICANT CHANGE UP (ref 22–29)
COLOR SPEC: YELLOW — SIGNIFICANT CHANGE UP
CREAT SERPL-MCNC: 0.72 MG/DL — SIGNIFICANT CHANGE UP (ref 0.5–1.3)
DIFF PNL FLD: NEGATIVE — SIGNIFICANT CHANGE UP
EPI CELLS # UR: ABNORMAL
GLUCOSE SERPL-MCNC: 93 MG/DL — SIGNIFICANT CHANGE UP (ref 70–99)
GLUCOSE UR QL: NEGATIVE MG/DL — SIGNIFICANT CHANGE UP
HCG SERPL-ACNC: <4 MIU/ML — SIGNIFICANT CHANGE UP
HCT VFR BLD CALC: 43 % — SIGNIFICANT CHANGE UP (ref 34.5–45)
HGB BLD-MCNC: 14.4 G/DL — SIGNIFICANT CHANGE UP (ref 11.5–15.5)
KETONES UR-MCNC: NEGATIVE — SIGNIFICANT CHANGE UP
LEUKOCYTE ESTERASE UR-ACNC: ABNORMAL
MAGNESIUM SERPL-MCNC: 2.1 MG/DL — SIGNIFICANT CHANGE UP (ref 1.6–2.6)
MCHC RBC-ENTMCNC: 31.2 PG — SIGNIFICANT CHANGE UP (ref 27–34)
MCHC RBC-ENTMCNC: 33.5 GM/DL — SIGNIFICANT CHANGE UP (ref 32–36)
MCV RBC AUTO: 93.3 FL — SIGNIFICANT CHANGE UP (ref 80–100)
NITRITE UR-MCNC: NEGATIVE — SIGNIFICANT CHANGE UP
PH UR: 7 — SIGNIFICANT CHANGE UP (ref 5–8)
PLATELET # BLD AUTO: 286 K/UL — SIGNIFICANT CHANGE UP (ref 150–400)
POTASSIUM SERPL-MCNC: 4.6 MMOL/L — SIGNIFICANT CHANGE UP (ref 3.5–5.3)
POTASSIUM SERPL-SCNC: 4.6 MMOL/L — SIGNIFICANT CHANGE UP (ref 3.5–5.3)
PROT SERPL-MCNC: 7.8 G/DL — SIGNIFICANT CHANGE UP (ref 6.6–8.7)
PROT UR-MCNC: NEGATIVE MG/DL — SIGNIFICANT CHANGE UP
RBC # BLD: 4.61 M/UL — SIGNIFICANT CHANGE UP (ref 3.8–5.2)
RBC # FLD: 15.9 % — HIGH (ref 10.3–14.5)
RBC CASTS # UR COMP ASSIST: NEGATIVE /HPF — SIGNIFICANT CHANGE UP (ref 0–4)
SODIUM SERPL-SCNC: 137 MMOL/L — SIGNIFICANT CHANGE UP (ref 135–145)
SP GR SPEC: 1 — LOW (ref 1.01–1.02)
UROBILINOGEN FLD QL: NEGATIVE MG/DL — SIGNIFICANT CHANGE UP
WBC # BLD: 6.85 K/UL — SIGNIFICANT CHANGE UP (ref 3.8–10.5)
WBC # FLD AUTO: 6.85 K/UL — SIGNIFICANT CHANGE UP (ref 3.8–10.5)
WBC UR QL: SIGNIFICANT CHANGE UP

## 2020-02-10 PROCEDURE — 81001 URINALYSIS AUTO W/SCOPE: CPT

## 2020-02-10 PROCEDURE — 99284 EMERGENCY DEPT VISIT MOD MDM: CPT | Mod: 25

## 2020-02-10 PROCEDURE — 71045 X-RAY EXAM CHEST 1 VIEW: CPT

## 2020-02-10 PROCEDURE — 71045 X-RAY EXAM CHEST 1 VIEW: CPT | Mod: 26

## 2020-02-10 PROCEDURE — 93010 ELECTROCARDIOGRAM REPORT: CPT

## 2020-02-10 PROCEDURE — 36415 COLL VENOUS BLD VENIPUNCTURE: CPT

## 2020-02-10 PROCEDURE — 84702 CHORIONIC GONADOTROPIN TEST: CPT

## 2020-02-10 PROCEDURE — 80053 COMPREHEN METABOLIC PANEL: CPT

## 2020-02-10 PROCEDURE — 83735 ASSAY OF MAGNESIUM: CPT

## 2020-02-10 PROCEDURE — 85027 COMPLETE CBC AUTOMATED: CPT

## 2020-02-10 PROCEDURE — 99284 EMERGENCY DEPT VISIT MOD MDM: CPT

## 2020-02-10 PROCEDURE — 93005 ELECTROCARDIOGRAM TRACING: CPT

## 2020-02-10 NOTE — ED PROVIDER NOTE - PATIENT PORTAL LINK FT
You can access the FollowMyHealth Patient Portal offered by [facility] by registering at the following website: [email or web address]. By joining [employer]’s FollowMyHealth portal, you will also be able to view your health information using other applications (apps) compatible with our system.

## 2020-02-10 NOTE — ED PROVIDER NOTE - CHPI ED SYMPTOMS POS
PALPITATIONS/NAUSEA/diarrhea, increased urinary frequency/DIZZINESS PALPITATIONS/DIZZINESS/diarrhea, increased urinary frequency

## 2020-02-10 NOTE — ED PROVIDER NOTE - NEUROLOGICAL, MLM
Alert and oriented, decreased sensation to her legs which she states is chronic secondary to her cauda equina.

## 2020-02-10 NOTE — ED PROVIDER NOTE - OBJECTIVE STATEMENT
45 y/o F pt presents to the ED c/o palpitations. Pt states that she came to [facility] 4 days ago, initially for a neck pain/pinched nerve in her neck, had labs that found her hypokalemic, given potassium, Decadron, Prednisone, and Oxycodone. Pt notes that she also had bilateral lower leg swelling prior to previous visit and that steroids provided no pain relief, but decreased the swelling in her legs. Now also c/o diarrhea (describes "pure liquid," but has not had BM today), increased urinary frequency, and urinary incontinence secondary to her cauda equina (with L leg paralyzed and decreased at baseline as per pt). Also reports an episode of dizziness, stating that she gets nauseous when taking her oral potassium. Denies SOB, taking any water pills (stated that her Cardiologist does not want her on water pills). Pt states that she tends to get adverse reactions to steroids. 43 y/o F with history of cauda equina pt presents to the ED c/o vague sense of  palpitations- stating she feels like her heart is going to skip a beat but denies episodes of tachycardia/chest pain/ dyspnea/ cough/ vomiting. Pt states that she came to [facility] 4 days ago, initially for a neck pain/pinched nerve in her neck and palpitations. She states that the labs found her hypokalemic. she was given potassium, Decadron, Prednisone, and Oxycodone. Pt notes that she also had bilateral lower leg swelling prior to previous visit and that steroids provided no pain relief, but decreased the swelling in her legs. She states that she tends to have adverse reaction to medications and since being given the decadron she has noticed increase in urination and a couple of episodes of diarrhea (describes "pure liquid," but has not had BM today), urinary incontinence ( chronic issue ) but more prominent because she is urinating so much. Also reports an episode of dizziness, stating that she gets nauseous when taking her oral potassium. Denies SOB, taking any water pills (stated that her Cardiologist does not want her on water pills dues causing hypokalemia in the past requiring admission for management). Pt states that she tends to get adverse reactions lots of medication; describing unintended effects when taking lots of common medication. .

## 2020-02-10 NOTE — ED PROVIDER NOTE - NSFOLLOWUPINSTRUCTIONS_ED_ALL_ED_FT
1) Follow up with your doctor in within one week  2) Return to the ER for worsening or concerning symptoms

## 2020-02-10 NOTE — ED PROVIDER NOTE - PROGRESS NOTE DETAILS
Orders placed prior to evaluating the pt and revised after assessment. Pt resting comfortably, labs are as noted, and is stable for discharge.

## 2020-02-10 NOTE — ED PROVIDER NOTE - CHPI ED SYMPTOMS NEG
no shortness of breath no shortness of breath/no nausea/no diaphoresis/no back pain/no cough/no chest pain/no fever/no syncope/no vomiting

## 2020-02-11 VITALS
DIASTOLIC BLOOD PRESSURE: 97 MMHG | HEART RATE: 65 BPM | TEMPERATURE: 99 F | SYSTOLIC BLOOD PRESSURE: 172 MMHG | OXYGEN SATURATION: 100 %

## 2020-02-14 ENCOUNTER — APPOINTMENT (OUTPATIENT)
Dept: FAMILY MEDICINE | Facility: CLINIC | Age: 45
End: 2020-02-14

## 2020-03-03 ENCOUNTER — APPOINTMENT (OUTPATIENT)
Dept: NEUROLOGY | Facility: CLINIC | Age: 45
End: 2020-03-03

## 2020-04-21 ENCOUNTER — APPOINTMENT (OUTPATIENT)
Dept: INTERNAL MEDICINE | Facility: CLINIC | Age: 45
End: 2020-04-21
Payer: MEDICAID

## 2020-04-21 VITALS
SYSTOLIC BLOOD PRESSURE: 152 MMHG | BODY MASS INDEX: 38.98 KG/M2 | WEIGHT: 220 LBS | RESPIRATION RATE: 16 BRPM | HEART RATE: 89 BPM | DIASTOLIC BLOOD PRESSURE: 97 MMHG | HEIGHT: 63 IN | TEMPERATURE: 97.2 F

## 2020-04-21 PROCEDURE — 36415 COLL VENOUS BLD VENIPUNCTURE: CPT

## 2020-04-21 PROCEDURE — 99214 OFFICE O/P EST MOD 30 MIN: CPT | Mod: 25

## 2020-05-08 LAB
ALBUMIN SERPL ELPH-MCNC: 4.3 G/DL
ALP BLD-CCNC: 90 U/L
ALT SERPL-CCNC: 7 U/L
ANION GAP SERPL CALC-SCNC: 14 MMOL/L
AST SERPL-CCNC: 16 U/L
BASOPHILS # BLD AUTO: 0.05 K/UL
BASOPHILS NFR BLD AUTO: 0.9 %
BILIRUB SERPL-MCNC: <0.2 MG/DL
BUN SERPL-MCNC: 11 MG/DL
CALCIUM SERPL-MCNC: 9.3 MG/DL
CHLORIDE SERPL-SCNC: 102 MMOL/L
CO2 SERPL-SCNC: 22 MMOL/L
CREAT SERPL-MCNC: 0.73 MG/DL
CRP SERPL-MCNC: 1.15 MG/DL
EOSINOPHIL # BLD AUTO: 0.18 K/UL
EOSINOPHIL NFR BLD AUTO: 3.3 %
ERYTHROCYTE [SEDIMENTATION RATE] IN BLOOD BY WESTERGREN METHOD: 101 MM/HR
GLUCOSE SERPL-MCNC: 132 MG/DL
HCT VFR BLD CALC: 42.2 %
HGB BLD-MCNC: 13.6 G/DL
IMM GRANULOCYTES NFR BLD AUTO: 0 %
LYMPHOCYTES # BLD AUTO: 2.73 K/UL
LYMPHOCYTES NFR BLD AUTO: 49.5 %
MAN DIFF?: NORMAL
MCHC RBC-ENTMCNC: 31.1 PG
MCHC RBC-ENTMCNC: 32.2 GM/DL
MCV RBC AUTO: 96.6 FL
MONOCYTES # BLD AUTO: 0.44 K/UL
MONOCYTES NFR BLD AUTO: 8 %
NEUTROPHILS # BLD AUTO: 2.11 K/UL
NEUTROPHILS NFR BLD AUTO: 38.3 %
PLATELET # BLD AUTO: 297 K/UL
POTASSIUM SERPL-SCNC: 4.1 MMOL/L
PROCALCITONIN SERPL-MCNC: 0.02 NG/ML
PROT SERPL-MCNC: 7.5 G/DL
RBC # BLD: 4.37 M/UL
RBC # FLD: 16.2 %
SODIUM SERPL-SCNC: 138 MMOL/L
WBC # FLD AUTO: 5.51 K/UL

## 2020-05-14 ENCOUNTER — APPOINTMENT (OUTPATIENT)
Dept: INTERNAL MEDICINE | Facility: CLINIC | Age: 45
End: 2020-05-14
Payer: MEDICAID

## 2020-05-14 VITALS
HEART RATE: 77 BPM | HEIGHT: 63 IN | DIASTOLIC BLOOD PRESSURE: 90 MMHG | OXYGEN SATURATION: 98 % | RESPIRATION RATE: 16 BRPM | BODY MASS INDEX: 38.98 KG/M2 | SYSTOLIC BLOOD PRESSURE: 168 MMHG | WEIGHT: 220 LBS

## 2020-05-14 DIAGNOSIS — E78.9 DISORDER OF LIPOPROTEIN METABOLISM, UNSPECIFIED: ICD-10-CM

## 2020-05-14 DIAGNOSIS — M47.812 SPONDYLOSIS W/OUT MYELOPATHY OR RADICULOPATHY, CERVICAL REGION: ICD-10-CM

## 2020-05-14 DIAGNOSIS — Z00.00 ENCOUNTER FOR GENERAL ADULT MEDICAL EXAMINATION W/OUT ABNORMAL FINDINGS: ICD-10-CM

## 2020-05-14 DIAGNOSIS — E03.9 HYPOTHYROIDISM, UNSPECIFIED: ICD-10-CM

## 2020-05-14 DIAGNOSIS — F11.20 OPIOID DEPENDENCE, UNCOMPLICATED: ICD-10-CM

## 2020-05-14 DIAGNOSIS — Z72.0 TOBACCO USE: ICD-10-CM

## 2020-05-14 PROCEDURE — 99213 OFFICE O/P EST LOW 20 MIN: CPT | Mod: 25

## 2020-05-14 PROCEDURE — 99396 PREV VISIT EST AGE 40-64: CPT | Mod: 25

## 2020-05-14 PROCEDURE — 36415 COLL VENOUS BLD VENIPUNCTURE: CPT

## 2020-05-14 PROCEDURE — 99386 PREV VISIT NEW AGE 40-64: CPT | Mod: 25

## 2020-05-14 NOTE — HISTORY OF PRESENT ILLNESS
[FreeTextEntry1] : np est [de-identified] : Ms. CLARENCE VITALE is a 45 year female with a PMH of HTN, DM,  (non-compliant with medication) Cauda equina syndrome comes to the office for physical exam

## 2020-05-14 NOTE — PLAN
[FreeTextEntry1] : In regards to patients Physical exam, routine blood work drawn, will review results with patient.\par \par \par PATIENT REFUSED TO TAKE TEMPERATURE AT TIME OF VISIT\par \par Patient is non compliant with diabetes medication, will test HBA1C today and call patient with results\par \par \par In regards to patient's cauda equina syndrome, patient was referred to Neurosurgeon for further evaluation and management. \par \par Counseling included abnormal lab results, differential diagnoses, treatment options, risks and benefits, lifestyle changes, current condition, medications, and dose adjustments. \par The patient was interactive, attentive, asked questions, and verbalized understanding

## 2020-05-14 NOTE — HEALTH RISK ASSESSMENT
[Poor] : ~his/her~ mood as  poor [de-identified] : cardiologist- Dr. Pastor, ID [Patient declined PAP Smear] : Patient declined PAP Smear [Patient declined bone density test] : Patient declined bone density test [Patient declined mammogram] : Patient declined mammogram [Patient declined colonoscopy] : Patient declined colonoscopy [HIV test declined] : HIV test declined [PapSmearComments] : OB/GYNDr. Rosaura s/p hysterectomy [Hepatitis C test declined] : Hepatitis C test declined

## 2020-05-14 NOTE — PHYSICAL EXAM
[No Acute Distress] : no acute distress [Well Nourished] : well nourished [Well Developed] : well developed [Well-Appearing] : well-appearing [Normal Sclera/Conjunctiva] : normal sclera/conjunctiva [PERRL] : pupils equal round and reactive to light [EOMI] : extraocular movements intact [Normal Outer Ear/Nose] : the outer ears and nose were normal in appearance [Normal Oropharynx] : the oropharynx was normal [No JVD] : no jugular venous distention [No Lymphadenopathy] : no lymphadenopathy [Supple] : supple [Thyroid Normal, No Nodules] : the thyroid was normal and there were no nodules present [No Respiratory Distress] : no respiratory distress  [No Accessory Muscle Use] : no accessory muscle use [Clear to Auscultation] : lungs were clear to auscultation bilaterally [Normal S1, S2] : normal S1 and S2 [Regular Rhythm] : with a regular rhythm [Normal Rate] : normal rate  [No Carotid Bruits] : no carotid bruits [No Murmur] : no murmur heard [No Abdominal Bruit] : a ~M bruit was not heard ~T in the abdomen [Pedal Pulses Present] : the pedal pulses are present [No Varicosities] : no varicosities [No Palpable Aorta] : no palpable aorta [No Edema] : there was no peripheral edema [No Extremity Clubbing/Cyanosis] : no extremity clubbing/cyanosis [Soft] : abdomen soft [Non Tender] : non-tender [Non-distended] : non-distended [No Masses] : no abdominal mass palpated [No HSM] : no HSM [Normal Bowel Sounds] : normal bowel sounds [Normal Anterior Cervical Nodes] : no anterior cervical lymphadenopathy [Normal Posterior Cervical Nodes] : no posterior cervical lymphadenopathy [No CVA Tenderness] : no CVA  tenderness [No Spinal Tenderness] : no spinal tenderness [Grossly Normal Strength/Tone] : grossly normal strength/tone [No Joint Swelling] : no joint swelling [No Rash] : no rash [Normal Gait] : normal gait [Normal Affect] : the affect was normal [Normal Insight/Judgement] : insight and judgment were intact [de-identified] : patient ambulate with walker, right foot inverted

## 2020-05-15 LAB
ALBUMIN SERPL ELPH-MCNC: 4.4 G/DL
ALP BLD-CCNC: 79 U/L
ALT SERPL-CCNC: <5 U/L
ANION GAP SERPL CALC-SCNC: 15 MMOL/L
AST SERPL-CCNC: 13 U/L
BASOPHILS # BLD AUTO: 0.02 K/UL
BASOPHILS NFR BLD AUTO: 0.4 %
BILIRUB SERPL-MCNC: 0.2 MG/DL
BUN SERPL-MCNC: 9 MG/DL
CALCIUM SERPL-MCNC: 9.2 MG/DL
CHLORIDE SERPL-SCNC: 104 MMOL/L
CHOLEST SERPL-MCNC: 173 MG/DL
CHOLEST/HDLC SERPL: 3.4 RATIO
CO2 SERPL-SCNC: 22 MMOL/L
CREAT SERPL-MCNC: 0.75 MG/DL
EOSINOPHIL # BLD AUTO: 0.2 K/UL
EOSINOPHIL NFR BLD AUTO: 3.7 %
ESTIMATED AVERAGE GLUCOSE: 128 MG/DL
GLUCOSE SERPL-MCNC: 136 MG/DL
HBA1C MFR BLD HPLC: 6.1 %
HCT VFR BLD CALC: 40.8 %
HDLC SERPL-MCNC: 51 MG/DL
HGB BLD-MCNC: 13.6 G/DL
IMM GRANULOCYTES NFR BLD AUTO: 0.2 %
LDLC SERPL CALC-MCNC: 96 MG/DL
LYMPHOCYTES # BLD AUTO: 2.05 K/UL
LYMPHOCYTES NFR BLD AUTO: 38.4 %
MAN DIFF?: NORMAL
MCHC RBC-ENTMCNC: 31.6 PG
MCHC RBC-ENTMCNC: 33.3 GM/DL
MCV RBC AUTO: 94.7 FL
MONOCYTES # BLD AUTO: 0.41 K/UL
MONOCYTES NFR BLD AUTO: 7.7 %
NEUTROPHILS # BLD AUTO: 2.65 K/UL
NEUTROPHILS NFR BLD AUTO: 49.6 %
PLATELET # BLD AUTO: 304 K/UL
POTASSIUM SERPL-SCNC: 3.9 MMOL/L
PROT SERPL-MCNC: 7.5 G/DL
RBC # BLD: 4.31 M/UL
RBC # FLD: 15.2 %
SODIUM SERPL-SCNC: 140 MMOL/L
TRIGL SERPL-MCNC: 127 MG/DL
TSH SERPL-ACNC: 3.1 UIU/ML
WBC # FLD AUTO: 5.34 K/UL

## 2020-05-15 RX ORDER — METFORMIN HYDROCHLORIDE 500 MG/1
500 TABLET, COATED ORAL
Refills: 0 | Status: DISCONTINUED | COMMUNITY
End: 2020-05-15

## 2020-05-28 ENCOUNTER — APPOINTMENT (OUTPATIENT)
Dept: NEUROSURGERY | Facility: CLINIC | Age: 45
End: 2020-05-28
Payer: MEDICAID

## 2020-05-28 DIAGNOSIS — F17.200 NICOTINE DEPENDENCE, UNSPECIFIED, UNCOMPLICATED: ICD-10-CM

## 2020-05-28 DIAGNOSIS — Z86.79 PERSONAL HISTORY OF OTHER DISEASES OF THE CIRCULATORY SYSTEM: ICD-10-CM

## 2020-05-28 PROCEDURE — 99204 OFFICE O/P NEW MOD 45 MIN: CPT

## 2020-05-29 PROBLEM — Z86.79 HISTORY OF HYPERTENSION: Status: RESOLVED | Noted: 2020-05-28 | Resolved: 2020-05-29

## 2020-05-29 PROBLEM — F17.200 CURRENT SMOKER: Status: ACTIVE | Noted: 2020-05-28

## 2020-05-29 NOTE — PHYSICAL EXAM
[General Appearance - Alert] : alert [Oriented To Time, Place, And Person] : oriented to person, place, and time [Person] : oriented to person [Place] : oriented to place [Time] : oriented to time [Short Term Intact] : short term memory intact [Concentration Intact] : normal concentrating ability [Cranial Nerves Optic (II)] : visual acuity intact bilaterally,  pupils equal round and reactive to light [Fluency] : fluency intact [Cranial Nerves Oculomotor (III)] : extraocular motion intact [Cranial Nerves Facial (VII)] : face symmetrical [Cranial Nerves Hypoglossal (XII)] : there was no tongue deviation with protrusion [FreeTextEntry6] : UE 5/5 bilaterally\par RLE 5/5 with encouragement\par L IP 3/5; KE KF DF PF EHL 0/5; left foot inverted [FreeTextEntry8] : ambulates with walker

## 2020-05-29 NOTE — DATA REVIEWED
[de-identified] : MRI of the cervical spine from recent ER visit to [facility] was viewed along with the official report.  There is no severe DDD or spinal cord/nerve root compression.

## 2020-05-29 NOTE — REASON FOR VISIT
[New Patient Visit] : a new patient visit [FreeTextEntry1] : history of L5-S1 laminectomy and diskectomy

## 2020-05-29 NOTE — ASSESSMENT
[FreeTextEntry1] : Ms. Valencia presents for evaluation with history as above.  Given her new complaints, I will order an MRI of the LS spine w and wo contrast and see the patient back upon completion of imaging.  I have provided a pain management referral per her request.  Ms. Valencia knows to call the office with any new or concerning symptoms in the interim.

## 2020-06-05 ENCOUNTER — APPOINTMENT (OUTPATIENT)
Dept: INTERNAL MEDICINE | Facility: CLINIC | Age: 45
End: 2020-06-05
Payer: MEDICAID

## 2020-06-05 VITALS
WEIGHT: 220 LBS | BODY MASS INDEX: 38.98 KG/M2 | HEIGHT: 63 IN | DIASTOLIC BLOOD PRESSURE: 86 MMHG | TEMPERATURE: 97.8 F | SYSTOLIC BLOOD PRESSURE: 148 MMHG

## 2020-06-05 DIAGNOSIS — E11.9 TYPE 2 DIABETES MELLITUS W/OUT COMPLICATIONS: ICD-10-CM

## 2020-06-05 DIAGNOSIS — Z20.828 CONTACT WITH AND (SUSPECTED) EXPOSURE TO OTHER VIRAL COMMUNICABLE DISEASES: ICD-10-CM

## 2020-06-05 DIAGNOSIS — I10 ESSENTIAL (PRIMARY) HYPERTENSION: ICD-10-CM

## 2020-06-05 PROCEDURE — 99214 OFFICE O/P EST MOD 30 MIN: CPT | Mod: 25

## 2020-06-05 PROCEDURE — 36415 COLL VENOUS BLD VENIPUNCTURE: CPT

## 2020-06-05 NOTE — PHYSICAL EXAM
[No Acute Distress] : no acute distress [Well Developed] : well developed [Well Nourished] : well nourished [Well-Appearing] : well-appearing [PERRL] : pupils equal round and reactive to light [Normal Sclera/Conjunctiva] : normal sclera/conjunctiva [Normal Outer Ear/Nose] : the outer ears and nose were normal in appearance [EOMI] : extraocular movements intact [Normal Oropharynx] : the oropharynx was normal [No Lymphadenopathy] : no lymphadenopathy [No JVD] : no jugular venous distention [Supple] : supple [Thyroid Normal, No Nodules] : the thyroid was normal and there were no nodules present [No Accessory Muscle Use] : no accessory muscle use [No Respiratory Distress] : no respiratory distress  [Clear to Auscultation] : lungs were clear to auscultation bilaterally [Normal Rate] : normal rate  [Regular Rhythm] : with a regular rhythm [Normal S1, S2] : normal S1 and S2 [No Murmur] : no murmur heard [No Carotid Bruits] : no carotid bruits [No Abdominal Bruit] : a ~M bruit was not heard ~T in the abdomen [Pedal Pulses Present] : the pedal pulses are present [No Varicosities] : no varicosities [No Edema] : there was no peripheral edema [No Palpable Aorta] : no palpable aorta [No Extremity Clubbing/Cyanosis] : no extremity clubbing/cyanosis [Soft] : abdomen soft [Non Tender] : non-tender [Non-distended] : non-distended [No Masses] : no abdominal mass palpated [No HSM] : no HSM [Normal Bowel Sounds] : normal bowel sounds [Normal Posterior Cervical Nodes] : no posterior cervical lymphadenopathy [Normal Anterior Cervical Nodes] : no anterior cervical lymphadenopathy [No CVA Tenderness] : no CVA  tenderness [No Spinal Tenderness] : no spinal tenderness [No Joint Swelling] : no joint swelling [Grossly Normal Strength/Tone] : grossly normal strength/tone [No Rash] : no rash [Coordination Grossly Intact] : coordination grossly intact [No Focal Deficits] : no focal deficits [Normal Gait] : normal gait [Deep Tendon Reflexes (DTR)] : deep tendon reflexes were 2+ and symmetric [Normal Affect] : the affect was normal [Normal Insight/Judgement] : insight and judgment were intact

## 2020-06-05 NOTE — PLAN
[FreeTextEntry1] : In regards to patient's joint pain, will take blood and will call patient with results \par Patient was referred to rheumatologist for further evaluation and management. \par \par patient's BP well controlled with current medication. will continue current  regimen \par \par In regards to patient's cauda equina syndrome, patient will continue to follow Neurosurgeon for further evaluation and management. \par \par Counseling included abnormal lab results, differential diagnoses, treatment options, risks and benefits, lifestyle changes, current condition, medications, and dose adjustments. \par The patient was interactive, attentive, asked questions, and verbalized understanding

## 2020-06-05 NOTE — HISTORY OF PRESENT ILLNESS
[FreeTextEntry1] : joint pain [de-identified] : Ms. CLARENCE VITALE is a 45 year female with a PMH of HTN, DM,  (non-compliant with medication) Cauda equina syndrome comes to the office for physical exam

## 2020-06-08 LAB
ANA SER IF-ACNC: NEGATIVE
CENTROMERE IGG SER-ACNC: <0.2 CD:130001892
CRP SERPL-MCNC: 0.88 MG/DL
ERYTHROCYTE [SEDIMENTATION RATE] IN BLOOD BY WESTERGREN METHOD: 73 MM/HR
RHEUMATOID FACT SER QL: <10 IU/ML
SARS-COV-2 IGG SERPL IA-ACNC: 0.1 INDEX
SARS-COV-2 IGG SERPL QL IA: NEGATIVE
URATE SERPL-MCNC: 3 MG/DL

## 2020-06-10 ENCOUNTER — EMERGENCY (EMERGENCY)
Facility: HOSPITAL | Age: 45
LOS: 1 days | Discharge: HOME | End: 2020-06-10
Attending: EMERGENCY MEDICINE
Payer: COMMERCIAL

## 2020-06-10 VITALS
OXYGEN SATURATION: 98 % | WEIGHT: 220.02 LBS | SYSTOLIC BLOOD PRESSURE: 142 MMHG | HEIGHT: 63 IN | HEART RATE: 88 BPM | DIASTOLIC BLOOD PRESSURE: 84 MMHG | RESPIRATION RATE: 18 BRPM

## 2020-06-10 DIAGNOSIS — Z90.49 ACQUIRED ABSENCE OF OTHER SPECIFIED PARTS OF DIGESTIVE TRACT: Chronic | ICD-10-CM

## 2020-06-10 DIAGNOSIS — Z98.890 OTHER SPECIFIED POSTPROCEDURAL STATES: Chronic | ICD-10-CM

## 2020-06-10 PROCEDURE — 99283 EMERGENCY DEPT VISIT LOW MDM: CPT

## 2020-06-10 PROCEDURE — 99284 EMERGENCY DEPT VISIT MOD MDM: CPT

## 2020-06-10 RX ORDER — OXYCODONE AND ACETAMINOPHEN 5; 325 MG/1; MG/1
1 TABLET ORAL
Qty: 8 | Refills: 0
Start: 2020-06-10 | End: 2020-06-11

## 2020-06-10 RX ORDER — LIDOCAINE 4 G/100G
1 CREAM TOPICAL ONCE
Refills: 0 | Status: COMPLETED | OUTPATIENT
Start: 2020-06-10 | End: 2020-06-10

## 2020-06-10 RX ORDER — OXYCODONE AND ACETAMINOPHEN 5; 325 MG/1; MG/1
1 TABLET ORAL ONCE
Refills: 0 | Status: DISCONTINUED | OUTPATIENT
Start: 2020-06-10 | End: 2020-06-10

## 2020-06-10 RX ADMIN — OXYCODONE AND ACETAMINOPHEN 1 TABLET(S): 5; 325 TABLET ORAL at 15:51

## 2020-06-10 NOTE — ED PROVIDER NOTE - OBJECTIVE STATEMENT
Pt is a 45y F with PMH of chronic back pain/ DM/ HTN presenting for pain medication. She denies any new symptoms. She has low back pain radiating down L LE. She reports she has stenosis/ cauda equina/ herniated discs and needs sugery. Pt states she was last prescribed her pain medication by her neurologist who she is no longer seeing. She states she needs surgery and is now seeing Dr. Davila. She states she was sent to pain management but she states they cannot help her. Her last prescription was from 5/9/20 and it was a 15 day supply of percocet. Pt was told by myself and attending that she needs to stay with one prescribing doctor. No new complaints. Pt ambulating with walker. She reports she has chronic incontinence. She denies chest pain/SOB/abd pain. No new trauma. Pt is a 45y F with PMH of chronic back pain/ DM/ HTN presenting for pain medication. She denies any new symptoms. She has low back pain radiating down L LE. She reports she has stenosis/ cauda equina/ herniated discs and needs surgery. She has paperwork from Dr. Joseph Davila with diagnosis. Pt states she was last prescribed her pain medication by her old neurologist who she is no longer seeing due to insurance reasons. She states she now needs surgery again and is seeing Dr. Davila. She states she was sent to pain management but she states they cannot help her. Her last prescription was from 5/9/20 and it was a 15 day supply of percocet from her former neurologist. Pt was told by myself and attending that she needs to stay with one prescribing doctor. No new complaints. Pt ambulating with walker. She reports she has chronic incontinence since he dignosis. She denies chest pain/SOB/abd pain. No new trauma. Pt is a 45y F with PMH of chronic back pain/ DM/ HTN presenting for pain medication. She denies any new symptoms. She has low back pain radiating down L LE. She reports she has stenosis/ cauda equina/ herniated discs and needs surgery. She has paperwork from Dr. Joseph Davila with diagnoses. Pt states she was last prescribed her pain medication by her old neurologist who she is no longer seeing due to insurance reasons. She states she now needs surgery again and is seeing Dr. Davila currently. She states she was sent to pain management but she states they cannot help her. Her last prescription was from 5/9/20 and it was a 15 day supply of percocet from her former neurologist. Pt was told by myself and attending that she needs to stay with one prescribing doctor. No new complaints. Pt ambulating with walker. She reports she has chronic incontinence since he dignosis. She denies chest pain/SOB/abd pain. No new trauma. Pt is a 45y F with PMH of chronic back pain/ DM/ HTN presenting for pain medication. She denies any new symptoms. She has had 2 spinal surgeries in the past that left her with left leg weakness/numbness. She uses walker and drags leg at baseline She currently has stenosis/ cauda equina/ herniated discs and needs surgery. She has paperwork from Dr. Joseph Davila with diagnoses. Pt states she was last prescribed her pain medication by her old neurologist who she is no longer seeing due to insurance reasons. She states she now needs surgery again and is seeing Dr. Davila currently. She states she was sent to pain management but she states they cannot help her. Her last prescription was from 5/9/20 and it was a 15 day supply of percocet from her former neurologist. Pt was told by myself and attending that she needs to stay with one prescribing doctor. No new complaints. Pt ambulating with walker. She reports she has chronic incontinence since he dignosis. She denies chest pain/SOB/abd pain. No new trauma.

## 2020-06-10 NOTE — ED PROVIDER NOTE - CARE PROVIDER_API CALL
Giuseppe Ambrosio  ANESTHESIOLOGY  [address] [place] [address]  Phone: [phone]  Fax: [phone]  Follow Up Time:

## 2020-06-10 NOTE — ED PROVIDER NOTE - PROGRESS NOTE DETAILS
called new pain management office and no answer. Will have her f/u with neuro and pain management. tp told she needs to follow with pain management for pain medication. Will lucero. Pt refusing temp. I called new pain management office and no answer. Will have her f/u with neuro and pain management. tp told she needs to follow with pain management for pain medication. Will dc. Pt refusing temp. I called new pain management office and no answer. consult placed to pain management. Will have her f/u with neuro and pain management. tp told she needs to follow with pain management only for pain medication. Will dc. Spoke with pain management and they state pt will be seen. She needs all her paperwork from neuro and initial visit before they can prescribe medication. Pt told to call tomorrow to make the appointment. Pain management states they are aware and will make sure she is seen. Pt refusing temp. I called new pain management office and they are closed. consult placed to pain management. Will have her f/u with neuro and pain management. pt told she needs to follow with pain management only for pain medication. Spoke with pain management and they state pt will be seen in office. They told her she needed all her paper work from neuro and an initial visit before they can prescribe medication. Pt told to call tomorrow to make the appointment. Pain management states they are aware and will make sure she is seen. I told Pain management I will send 2 days worth of percocet. Pt agreeable to plan.

## 2020-06-10 NOTE — ED PROVIDER NOTE - ATTENDING CONTRIBUTION TO CARE
I, Aden Weeks, performed a face to face bedside interview with this patient regarding history of present illness, review of symptoms and relevant past medical, social and family history.  I completed an independent physical examination. I have communicated the patient’s plan of care and disposition with the ACP.    a 45y F with PMH of chronic back pain/ DM/ HTN presenting for pain medication. She denies any new symptoms. She has had 2 spinal surgeries in the past that left her with left leg weakness/numbness. She uses walker and drags leg at baseline She currently has stenosis/ cauda equina/ herniated discs followed by Dr Davila;  pe awake alert in nad heent ncat neck supple cor s1 s2 lungs clear abd soft nontender back mild paraspinal tenderness lower back; dx chronic back pain

## 2020-06-10 NOTE — ED PROVIDER NOTE - PHYSICAL EXAMINATION
lumbar spine tenderness to palpation  Lumbar paraspinal tenderness.  pedal pulses intact.  Pt is ambulatory with walker. lumbar spine tenderness to palpation  Lumbar paraspinal tenderness.  pedal pulses intact.  Pt is ambulatory with walker.  4/5 rt lower extremity strength with intact sensation  0/5 Lt LE strength with decreased sensation

## 2020-06-10 NOTE — ED PROVIDER NOTE - PATIENT PORTAL LINK FT
You can access the FollowMyHealth Patient Portal offered by [facility] by registering at the following website: [email or web address]. By joining [employer]’s FollowMyHealth portal, you will also be able to view your health information using other applications (apps) compatible with our system. You can access the FollowMyHealth Patient Portal offered by [facility] by registering at the following website: [email or web address]. By joining [employer]’s FollowMyHealth portal, you will also be able to view your health information using other applications (apps) compatible with our system.

## 2020-06-12 LAB
CCP AB SER IA-ACNC: <8 UNITS
RF+CCP IGG SER-IMP: NEGATIVE

## 2020-06-26 ENCOUNTER — EMERGENCY (EMERGENCY)
Facility: HOSPITAL | Age: 45
LOS: 1 days | Discharge: HOME | End: 2020-06-26
Attending: EMERGENCY MEDICINE
Payer: COMMERCIAL

## 2020-06-26 VITALS
HEART RATE: 94 BPM | OXYGEN SATURATION: 97 % | TEMPERATURE: 99 F | HEIGHT: 63 IN | RESPIRATION RATE: 18 BRPM | WEIGHT: 220.02 LBS | DIASTOLIC BLOOD PRESSURE: 78 MMHG | SYSTOLIC BLOOD PRESSURE: 125 MMHG

## 2020-06-26 DIAGNOSIS — Z90.49 ACQUIRED ABSENCE OF OTHER SPECIFIED PARTS OF DIGESTIVE TRACT: Chronic | ICD-10-CM

## 2020-06-26 DIAGNOSIS — Z98.890 OTHER SPECIFIED POSTPROCEDURAL STATES: Chronic | ICD-10-CM

## 2020-06-26 LAB
ALBUMIN SERPL ELPH-MCNC: 4 G/DL — SIGNIFICANT CHANGE UP (ref 3.3–5.2)
ALP SERPL-CCNC: 83 U/L — SIGNIFICANT CHANGE UP (ref 40–120)
ALT FLD-CCNC: 6 U/L — SIGNIFICANT CHANGE UP
ANION GAP SERPL CALC-SCNC: 13 MMOL/L — SIGNIFICANT CHANGE UP (ref 5–17)
AST SERPL-CCNC: 14 U/L — SIGNIFICANT CHANGE UP
BASOPHILS # BLD AUTO: 0.03 K/UL — SIGNIFICANT CHANGE UP (ref 0–0.2)
BASOPHILS NFR BLD AUTO: 0.4 % — SIGNIFICANT CHANGE UP (ref 0–2)
BILIRUB SERPL-MCNC: <0.2 MG/DL — LOW (ref 0.4–2)
BUN SERPL-MCNC: 19 MG/DL — SIGNIFICANT CHANGE UP (ref 8–20)
CALCIUM SERPL-MCNC: 10 MG/DL — SIGNIFICANT CHANGE UP (ref 8.6–10.2)
CHLORIDE SERPL-SCNC: 99 MMOL/L — SIGNIFICANT CHANGE UP (ref 98–107)
CO2 SERPL-SCNC: 25 MMOL/L — SIGNIFICANT CHANGE UP (ref 22–29)
CREAT SERPL-MCNC: 0.97 MG/DL — SIGNIFICANT CHANGE UP (ref 0.5–1.3)
EOSINOPHIL # BLD AUTO: 0.12 K/UL — SIGNIFICANT CHANGE UP (ref 0–0.5)
EOSINOPHIL NFR BLD AUTO: 1.8 % — SIGNIFICANT CHANGE UP (ref 0–6)
GLUCOSE SERPL-MCNC: 120 MG/DL — HIGH (ref 70–99)
HCT VFR BLD CALC: 39.5 % — SIGNIFICANT CHANGE UP (ref 34.5–45)
HGB BLD-MCNC: 13.4 G/DL — SIGNIFICANT CHANGE UP (ref 11.5–15.5)
IMM GRANULOCYTES NFR BLD AUTO: 0.1 % — SIGNIFICANT CHANGE UP (ref 0–1.5)
LYMPHOCYTES # BLD AUTO: 2.89 K/UL — SIGNIFICANT CHANGE UP (ref 1–3.3)
LYMPHOCYTES # BLD AUTO: 43.3 % — SIGNIFICANT CHANGE UP (ref 13–44)
MAGNESIUM SERPL-MCNC: 1.7 MG/DL — SIGNIFICANT CHANGE UP (ref 1.6–2.6)
MCHC RBC-ENTMCNC: 31.7 PG — SIGNIFICANT CHANGE UP (ref 27–34)
MCHC RBC-ENTMCNC: 33.9 GM/DL — SIGNIFICANT CHANGE UP (ref 32–36)
MCV RBC AUTO: 93.4 FL — SIGNIFICANT CHANGE UP (ref 80–100)
MONOCYTES # BLD AUTO: 0.61 K/UL — SIGNIFICANT CHANGE UP (ref 0–0.9)
MONOCYTES NFR BLD AUTO: 9.1 % — SIGNIFICANT CHANGE UP (ref 2–14)
NEUTROPHILS # BLD AUTO: 3.01 K/UL — SIGNIFICANT CHANGE UP (ref 1.8–7.4)
NEUTROPHILS NFR BLD AUTO: 45.3 % — SIGNIFICANT CHANGE UP (ref 43–77)
PLATELET # BLD AUTO: 258 K/UL — SIGNIFICANT CHANGE UP (ref 150–400)
POTASSIUM SERPL-MCNC: 4.2 MMOL/L — SIGNIFICANT CHANGE UP (ref 3.5–5.3)
POTASSIUM SERPL-SCNC: 4.2 MMOL/L — SIGNIFICANT CHANGE UP (ref 3.5–5.3)
PROT SERPL-MCNC: 7.4 G/DL — SIGNIFICANT CHANGE UP (ref 6.6–8.7)
RBC # BLD: 4.23 M/UL — SIGNIFICANT CHANGE UP (ref 3.8–5.2)
RBC # FLD: 14.6 % — HIGH (ref 10.3–14.5)
SODIUM SERPL-SCNC: 137 MMOL/L — SIGNIFICANT CHANGE UP (ref 135–145)
WBC # BLD: 6.67 K/UL — SIGNIFICANT CHANGE UP (ref 3.8–10.5)
WBC # FLD AUTO: 6.67 K/UL — SIGNIFICANT CHANGE UP (ref 3.8–10.5)

## 2020-06-26 PROCEDURE — 36415 COLL VENOUS BLD VENIPUNCTURE: CPT

## 2020-06-26 PROCEDURE — 85027 COMPLETE CBC AUTOMATED: CPT

## 2020-06-26 PROCEDURE — 99283 EMERGENCY DEPT VISIT LOW MDM: CPT

## 2020-06-26 PROCEDURE — 80053 COMPREHEN METABOLIC PANEL: CPT

## 2020-06-26 PROCEDURE — 93010 ELECTROCARDIOGRAM REPORT: CPT

## 2020-06-26 PROCEDURE — 83735 ASSAY OF MAGNESIUM: CPT

## 2020-06-26 PROCEDURE — 93005 ELECTROCARDIOGRAM TRACING: CPT

## 2020-06-26 PROCEDURE — 99284 EMERGENCY DEPT VISIT MOD MDM: CPT

## 2020-06-26 NOTE — ED ADULT TRIAGE NOTE - CHIEF COMPLAINT QUOTE
patient states that he K level is low needs to be treated patient states that her potassium/ K level is low needs to be treated as per patient

## 2020-06-26 NOTE — ED ADULT NURSE NOTE - OBJECTIVE STATEMENT
Pt presents to ED with complaints of "low potassium."  Pt. states she "feels when it gets low, so I drink 3 Pedialyte bottles  per day but I still feel like it is low."  Pt. endorses diarrhea "because of my rheumatoid" multiple times per day.  No other complaints at this time.  Pt. denies chest pain/sob/n/v/fever/chills.

## 2020-06-26 NOTE — ED STATDOCS - PHYSICAL EXAMINATION
VITAL SIGNS: I have reviewed nursing notes and confirm.  CONSTITUTIONAL: Well-developed; well-nourished; in no acute distress. Pt walks with a walker for her chronic back pain   SKIN: Skin exam is warm and dry, no acute rash.  HEAD: Normocephalic; atraumatic.  EYES: PERRL, EOM intact; conjunctiva and sclera clear.  ENT: No nasal discharge; airway clear. Throat clear.  NECK: Supple; non tender.    CARD: S1, S2 normal; Regular rate and rhythm.  RESP: No wheezes,  no rales or rhonchi.   ABD:  soft; non-distended; non-tender;   EXT: Normal ROM. No clubbing, cyanosis or edema.  NEURO: Alert, oriented. Grossly unremarkable. No focal deficits. no facial droop, moves all extremities,  normal gait   PSYCH: Cooperative, appropriate. VITAL SIGNS: I have reviewed nursing notes and confirm.  CONSTITUTIONAL: Well-developed; well-nourished; in no acute distress.  SKIN: Skin exam is warm and dry, no acute rash.  HEAD: Normocephalic; atraumatic.  EYES: PERRL, EOM intact; conjunctiva and sclera clear.  ENT: No nasal discharge; airway clear. Throat clear.  NECK: Supple; non tender.    CARD: S1, S2 normal; Regular rate and rhythm.  RESP: No wheezes,  no rales or rhonchi.   ABD:  soft; non-distended; non-tender;   EXT: Normal ROM. No clubbing, cyanosis or edema.  NEURO: Alert, oriented. Grossly unremarkable. No focal deficits. no facial droop, moves all extremities,  (+)  Pt walks with a walker for her chronic back pain   PSYCH: Cooperative, appropriate.

## 2020-06-26 NOTE — ED STATDOCS - OBJECTIVE STATEMENT
46 y/o F with PMHx of chronic back pain/ DM/ HTN/ Sciatica presents to the ED c/o hypokalemia. Pt states " I can feel when my potassium is low". Pt states that symptoms occur usually x3 a year where she experience generalized body pain and the cause for her symptoms is low potassium level. Pt is allergic to Amoxicillin.  Denies difficulty breathing, CP

## 2020-06-26 NOTE — ED STATDOCS - ATTENDING CONTRIBUTION TO CARE
I, Pardeep Bowie, performed the initial face to face bedside interview with this patient regarding history of present illness, review of symptoms and relevant past medical, social and family history.  I completed an independent physical examination.  I was the initial provider who evaluated this patient. I have signed out the follow up of any pending tests (i.e. labs, radiological studies) to the ACP.  I have communicated the patient’s plan of care and disposition with the ACP.

## 2020-06-26 NOTE — ED STATDOCS - PATIENT PORTAL LINK FT
You can access the FollowMyHealth Patient Portal offered by [facility] by registering at the following website: [email or web address]. By joining [employer]’s FollowMyHealth portal, you will also be able to view your health information using other applications (apps) compatible with our system.

## 2020-06-26 NOTE — ED STATDOCS - NS ED ROS FT
Review of Systems  •	CONSTITUTIONAL - no  fever, no diaphoresis, no weight change, (+) generalized body ache  •	SKIN - no rash  •	HEMATOLOGIC - no bleeding, no bruising  •	EYES - no eye pain, no blurred vision  •	ENT - no change in hearing, no pain  •	RESPIRATORY - no shortness of breath, no cough  •	CARDIAC - no chest pain, no palpitations  •	GI - no abd pain, no nausea, no vomiting, no diarrhea, no constipation, no bleeding  •	GENITO-URINARY - no discharge, no dysuria; no hematuria,   •	ENDO - no polydypsia, no polyurea, no heat/no cold intolerance  •	MUSCULOSKELETAL - no joint pain, no swelling, no redness  •	NEUROLOGIC - no weakness, no headache, no anesthesia, no paresthesias  •	PSYCH - no anxiety, non suicidal, non homicidal, no hallucination, no depression

## 2020-06-26 NOTE — ED STATDOCS - PROGRESS NOTE DETAILS
POLO- PT evaluated by intake physician. HPI/PE/ROS as noted above. Will follow up plan per intake physician. labs wnl, Pt reassessed, pt feeling better at this time, vss, pt able to walk, talk and vocalized plan of action. Discussed in depth and explained to pt in depth the next steps that need to be taking including proper follow up with PCP or specialists. All incidental findings were discussed with pt as well. Pt verbalized their concerns and all questions were answered. Pt understands dispo and wants discharge. Given good instructions when to return to ED and importance of f/u.

## 2020-07-30 NOTE — ED ADULT NURSE NOTE - CHIEF COMPLAINT QUOTE
pt states lumbar back pain for the past 2 days exacerbated today when she slipped and fell in the shower after her knee gave out. states left leg pain as well. no head trauma or LOC after fall.
room air

## 2020-08-27 ENCOUNTER — APPOINTMENT (OUTPATIENT)
Dept: ORTHOPEDIC SURGERY | Facility: CLINIC | Age: 45
End: 2020-08-27
Payer: MEDICAID

## 2020-08-27 DIAGNOSIS — G83.4 CAUDA EQUINA SYNDROME: ICD-10-CM

## 2020-08-27 DIAGNOSIS — M54.16 RADICULOPATHY, LUMBAR REGION: ICD-10-CM

## 2020-08-27 PROCEDURE — 73610 X-RAY EXAM OF ANKLE: CPT | Mod: LT

## 2020-08-27 PROCEDURE — 99204 OFFICE O/P NEW MOD 45 MIN: CPT

## 2020-08-27 NOTE — HISTORY OF PRESENT ILLNESS
[FreeTextEntry1] : Amie is a 46 yo female who presents in office today for an evaluation of her left ankle.  The patient has a history of severe back pain with LLE radicular pain.  The patient was diagnosed with Cauda Equina Syndrome.  The patient had the spine surgery done at [facility] in January 2019.  The patient started to experience low back pain with sciatic symptoms in November of 2018.  The patient relates the initial back pains to a fall she had in her apartment in November 2018.  The patient has urinary and bowel incontinence.  She had a second lumbar surgery in February 2019.  According to the patient post first surgery she had bladder incontinence.  After the second surgery she had bowel incontinence.  Pain scale in the left leg and ankle is 0/10 as she lost feeling post surgery.  She is using a walking.  She walks with a completely inverted left ankle and walks with her knee in full extension.

## 2020-08-27 NOTE — DISCUSSION/SUMMARY
[de-identified] : At this point I recommend the patient get a custom KAFO brace for her left lower extremity. In the interim she will continue to use her walker. She is to followup with her neurologist Dr. Joseph Davila post MRI of her lumbar spine which he ordered at her last visit on May 28, 2020. All of his questions were answered. She understood the treatment course at this time.

## 2020-08-27 NOTE — PHYSICAL EXAM
[de-identified] : \par Left Ankle Physical Examination:\par \par General: Alert and oriented x3.  In no acute distress.  Pleasant in nature with a normal affect.  No apparent respiratory distress. \par Erythema, Warmth, Rubor: Negative\par Swelling: Positive lower extremity swelling.\par \par On physical exam I can move the ankle to neutral passively. The patient has no motor function and sensation in the entire left lower extremity due to cauda equina syndrome. She is unable to control her left leg. When she walks she uses a walker and walks with her ankle completely inverted walking on the side of her foot.\par  [de-identified] : X-rays of the left ankle show no fractures or dislocations present. Ankle joint maintained.

## 2020-12-21 PROBLEM — Z86.19 HISTORY OF CANDIDIASIS OF VAGINA: Status: RESOLVED | Noted: 2019-03-29 | Resolved: 2020-12-21

## 2021-01-07 NOTE — ED ADULT NURSE NOTE - DISCHARGE DATE/TIME
Advised patient of UA. Inconsitent with UTI, however due to findings of microscopic hematuria advised repeat test with possible subsequent ultrasound studies vs. Urology referral. Patient does not menstruate, denies vaginal bleed at time of testing. Also advised discussing this at her next gynecology appointment. Patient expressed agreement.   08-May-2017 02:57

## 2021-01-07 NOTE — HISTORY OF PRESENT ILLNESS
Dr Kb Jama out to bedside  Will take back to surgery tomorrow   Staying in ICU tonight  Lab here for blood draw  VS stable maintaining 100 % on 2 liters nasal cannula  Complaint of back pain gave PRN IV Morphine 1 mg  Sister Brianna De La Cruz notified as to what room Patient going to  [facility]  ICU nurse given report  Awaiting transport to take to room [> 3 months] : more  than 3 months [FreeTextEntry1] : chronic LLE weakness, RLE radicular pain [de-identified] : Ms. Valencia is a 45 year-old female who presents for an additional neurosurgical opinion.  She has a history of severe back pain and LLE radicular pain with urinary incontinence which prompted admission to [facility].  The patient underwent and urgent L5-S1 laminectomy and diskectomy.  Per her report, she had worsening of LLE weakness and stable incontinence post procedure.  The patient subsequently underwent wound re-exploration for infect an was treated with IV antibiotics via PICC line.  The patient has been ambulatory with a walker secondary to severe LLE weakness and foot eversion since 1/2019.  She has persistent incontinence.  The patient now has complaint of RLE radicular pain and intermittent RLE and right labial numbness.  She denies RLE weakness.  She does not currently have complaints referable to her incision.

## 2021-01-08 NOTE — ED STATDOCS - GASTROINTESTINAL, MLM
Apply warm compresses to eye several times per day.     Apply erythromycin antibiotic ointment to eyelid margin once daily at bed time (okay if this gets in your eye).      Patient Education     Blepharitis    Blepharitis is an inflammation of the eyelid. It results in swelling of the eyelids, and it is often caused by a bacterial infection or a skin condition. Blepharitis is a common eye condition. There are two types. Anterior blepharitis occurs where the eyelashes are attached (outside front edge of the eyelid). Posterior blepharitis affects the inner edge of the eyelid that touches the eyeball.  In addition to swollen eyelids, blepharitis symptoms can include thick, yellow, dandruff-like scales that stick to the eyelid. There may be oily patches on the eyelid. The eyelashes may be crusted (with dandruff-like scales) when you wake up from sleeping. The irritated area may itch. The eyelids may be red. The eyes can be red and burn or sting. The eyes may tear a lot, or be dry. You can become sensitive to light or have blurred vision. Symptoms of blepharitis can cause irritability.  Blepharitis is a chronic condition and hard to cure. Even with successful treatment, recurrences are common. Good hygiene and home treatments (in the Home care section below) can improve your condition.  Causes  Causes of blepharitis may include:    Problems with the oil glands in the eyelid (meibomian glands)    Dandruff of the scalp and eyebrows (seborrheic dermatitis)    Acne rosacea (a skin condition that causes redness of the face, and other symptoms)    Eyelash mites (tiny organisms in the eyelash follicles)    Allergic reactions to cosmetics or medicines  Home care  Medicine: The healthcare provider may prescribe an antibiotic eye drops or ointment, artificial tears, and/or steroid eye drops. Follow all instructions for using these medicines. Use all medicines as directed. If you have pain, take medicine as advised by the  healthcare provider.    Wash your hands carefully with soap and warm water before and after caring for your eyes.    Apply a warm compress or a warm, moist washcloth to the eyelids for 1 minute, 2 to 3 times a day, to loosen the crust. Then, wipe away scales or crust from the eyelids.    After applying the warm compress, gently scrub the base of the eyelashes for almost 15 seconds per eyelid. To do this, close your eyes and use a moist eyelid cleansing wipe, clean washcloth, or cotton swab. Ask your healthcare provider about products (such as gentle baby shampoo) to use to help clean the eyelids.    You may be instructed to gently massage your eyelids to help unblock the eyelid glands. Follow all instructions given by the healthcare provider.    Unless told otherwise, on a regular basis, with eyes closed, clean your eyelids as directed by the healthcare provider. Blepharitis can be an ongoing problem.    Don't wear eye makeup until the inflammation goes away, or as directed by your healthcare provider.    Unless told otherwise, stop using contact lenses until you complete treatment for the condition.    Wash your hands regularly to help prevent dirt and bacteria from coming in contact with your eyelid.  Follow-up care  Follow up with your healthcare provider, or as advised. Your healthcare provider may refer you to an eye specialist (an optometrist or ophthalmologist) for further evaluation and treatment.  When to seek medical advice  Call your healthcare provider right away if any of these occur:    Increase in redness of the white part of the eye    Increase in swelling, redness, irritation, or pain of the eyelids    Eye pain    Change in vision (trouble seeing or blurring)    Drainage (pus, blood) from the eyelid    Fever of 100.4 F (38 C) or higher, or as directed by your healthcare provider  Svitlana last reviewed this educational content on 3/1/2018    [id number] [employer], [place]. [address] [place] [address]  Road, [place] [employer] [address]. All rights reserved. This information is not intended as a substitute for professional medical care. Always follow your healthcare professional's instructions.            abdomen soft, non-tender, and non-distended. Bowel sounds present.

## 2021-05-17 ENCOUNTER — EMERGENCY (EMERGENCY)
Facility: HOSPITAL | Age: 46
LOS: 1 days | Discharge: HOME | End: 2021-05-17
Attending: EMERGENCY MEDICINE
Payer: COMMERCIAL

## 2021-05-17 VITALS
HEART RATE: 82 BPM | TEMPERATURE: 99 F | RESPIRATION RATE: 16 BRPM | OXYGEN SATURATION: 96 % | HEIGHT: 63 IN | WEIGHT: 199.96 LBS | DIASTOLIC BLOOD PRESSURE: 87 MMHG | SYSTOLIC BLOOD PRESSURE: 162 MMHG

## 2021-05-17 VITALS
HEIGHT: 63 IN | OXYGEN SATURATION: 100 % | SYSTOLIC BLOOD PRESSURE: 181 MMHG | HEART RATE: 97 BPM | WEIGHT: 199.96 LBS | TEMPERATURE: 99 F | RESPIRATION RATE: 20 BRPM | DIASTOLIC BLOOD PRESSURE: 111 MMHG

## 2021-05-17 DIAGNOSIS — Z90.49 ACQUIRED ABSENCE OF OTHER SPECIFIED PARTS OF DIGESTIVE TRACT: Chronic | ICD-10-CM

## 2021-05-17 DIAGNOSIS — Z98.890 OTHER SPECIFIED POSTPROCEDURAL STATES: Chronic | ICD-10-CM

## 2021-05-17 PROCEDURE — 99284 EMERGENCY DEPT VISIT MOD MDM: CPT

## 2021-05-17 PROCEDURE — 99283 EMERGENCY DEPT VISIT LOW MDM: CPT

## 2021-05-17 PROCEDURE — 93005 ELECTROCARDIOGRAM TRACING: CPT

## 2021-05-17 PROCEDURE — 93010 ELECTROCARDIOGRAM REPORT: CPT

## 2021-05-17 RX ORDER — HYDROXYZINE HCL 10 MG
50 TABLET ORAL ONCE
Refills: 0 | Status: COMPLETED | OUTPATIENT
Start: 2021-05-17 | End: 2021-05-17

## 2021-05-17 RX ADMIN — Medication 100 MILLIGRAM(S): at 03:35

## 2021-05-17 RX ADMIN — Medication 50 MILLIGRAM(S): at 02:54

## 2021-05-17 RX ADMIN — Medication 60 MILLIGRAM(S): at 02:54

## 2021-05-17 NOTE — ED ADULT TRIAGE NOTE - NS ED TRIAGE AVPU SCALE
Please have her follow the recommendations of her Dermatologist. 
 
Dr. Jennifer [facility] [address] [place] [address] 207, [facility], [address] Phone: [phone] Fax: [phone] Alert-The patient is alert, awake and responds to voice. The patient is oriented to time, place, and person. The triage nurse is able to obtain subjective information.

## 2021-05-17 NOTE — ED PROVIDER NOTE - NSFOLLOWUPCLINICS_GEN_ALL_ED_FT
[facility] [place]  Dermatology  [address] [place] [id number]  Phone: [phone]  Fax: [phone]

## 2021-05-17 NOTE — ED PROVIDER NOTE - OBJECTIVE STATEMENT
pt is a 45 y/o female presenting to the ed for evaluation of rash. pt states she noticed red spots in her legs this morning. pt states rash is itchiness, denies new medications. pt allergy to amoxicillin and penicillin, not on these medications currently. pt denies new laundry detergent new lotions. pt put on benadryl topical lotion without relief. pt states has hx of MRSA. pt denies cp sob fever abd pain nausea vomiting numbness or loss of sensation back pain headache recent travel

## 2021-05-17 NOTE — ED PROVIDER NOTE - CLINICAL SUMMARY MEDICAL DECISION MAKING FREE TEXT BOX
Antibiotics pt to follow up with pmd/dermatology pt explained dc instructions return to the ed for worsening sxs

## 2021-05-17 NOTE — ED PROVIDER NOTE - PHYSICAL EXAMINATION
Const: Awake, alert and oriented. In no acute distress. Well appearing.  HEENT: NC/AT. Moist mucous membranes. no tongue swelling noted  Eyes: No scleral icterus. EOMI.  Neck:. Soft and supple. Full ROM without pain.  Cardiac: +S1/S2. No murmurs. Peripheral pulses 2+ and symmetric. No LE edema.  Resp: Speaking in full sentences. No evidence of respiratory distress. No wheezes, rales or rhonchi.  Abd: Soft, non-tender, non-distended. Normal bowel sounds in all 4 quadrants. No guarding or rebound.  Back: Spine midline and non-tender. No CVAT.  Skin: Pruritic, erythematous, non-blanching rash to the bilateral legs and right forearm, no linear streaking  no purulent drainage no signs of nec fasciitis   Lymph: No cervical lymphadenopathy.  Neuro: Awake, alert & oriented x 3. Moves all extremities symmetrically.

## 2021-05-17 NOTE — ED PROVIDER NOTE - PATIENT PORTAL LINK FT
You can access the FollowMyHealth Patient Portal offered by [facility] by registering at the following website: [email or web address]. By joining [employer]’s FollowMyHealth portal, you will also be able to view your health information using other applications (apps) compatible with our system.

## 2021-05-17 NOTE — ED ADULT TRIAGE NOTE - CHIEF COMPLAINT QUOTE
PT coming to Ed C/O of rash that spreads from her legs up to her torso.  Red and itchy.  Pt was seen her last night for same complaint and given prednisone and abx.

## 2021-05-17 NOTE — ED ADULT TRIAGE NOTE - CHIEF COMPLAINT QUOTE
Pt. BIBA for allergic reaction. pt. has red spots on legs. Pt. complaining of itchiness. Pt. unaware of what caused it. Denies new lotions/ laundry detergent. Pt. states she put on benadryl topical without relief. Denies other symptoms.

## 2021-05-18 VITALS
SYSTOLIC BLOOD PRESSURE: 147 MMHG | RESPIRATION RATE: 20 BRPM | TEMPERATURE: 98 F | DIASTOLIC BLOOD PRESSURE: 91 MMHG | OXYGEN SATURATION: 98 % | HEART RATE: 85 BPM

## 2021-05-18 RX ORDER — DIPHENHYDRAMINE HCL 50 MG
2 CAPSULE ORAL
Qty: 40 | Refills: 0
Start: 2021-05-18 | End: 2021-05-22

## 2021-05-18 NOTE — ED PROVIDER NOTE - PATIENT PORTAL LINK FT
You can access the FollowMyHealth Patient Portal offered by [facility] by registering at the following website: [email or web address]. By joining [employer]’s FollowMyHealth portal, you will also be able to view your health information using other applications (apps) compatible with our system.

## 2021-05-18 NOTE — ED PROVIDER NOTE - NSFOLLOWUPINSTRUCTIONS_ED_ALL_ED_FT
Contact Dermatitis    Dermatitis is redness, soreness, and swelling (inflammation) of the skin. Contact dermatitis is a reaction to certain substances that touch the skin. Many substances can cause contact dermatitis including poisonous plants, chemicals, jewelry, metals, etc. Symptoms can include dryness, redness, itching, and pain.    SEEK IMMEDIATE MEDICAL CARE IF YOU HAVE ANY OF THE FOLLOWING SYMPTOMS: headache, fever, vomiting, red streaking from the affected area, or shortness of breath.

## 2021-05-18 NOTE — ED PROVIDER NOTE - CLINICAL SUMMARY MEDICAL DECISION MAKING FREE TEXT BOX
non toxic macular rash trial benadryl no mucosal involvement pt non toxic rx sent to correct pharmacy

## 2021-05-18 NOTE — ED PROVIDER NOTE - OBJECTIVE STATEMENT
pt presents one week itch rash to legs no h.o dvt or pe no mucosal involvement no headache no chest pain or sob no abd pain . she did not get rx that was rx to her other night, states was at wrong pharmacy

## 2021-05-27 ENCOUNTER — EMERGENCY (EMERGENCY)
Facility: HOSPITAL | Age: 46
LOS: 0 days | Discharge: HOME | End: 2021-05-27
Attending: EMERGENCY MEDICINE
Payer: MEDICAID

## 2021-05-27 VITALS
RESPIRATION RATE: 18 BRPM | HEART RATE: 95 BPM | OXYGEN SATURATION: 100 % | SYSTOLIC BLOOD PRESSURE: 162 MMHG | TEMPERATURE: 98 F | DIASTOLIC BLOOD PRESSURE: 110 MMHG

## 2021-05-27 VITALS — DIASTOLIC BLOOD PRESSURE: 83 MMHG | HEART RATE: 90 BPM | SYSTOLIC BLOOD PRESSURE: 137 MMHG

## 2021-05-27 DIAGNOSIS — Z88.0 ALLERGY STATUS TO PENICILLIN: ICD-10-CM

## 2021-05-27 DIAGNOSIS — E87.6 HYPOKALEMIA: ICD-10-CM

## 2021-05-27 DIAGNOSIS — R00.2 PALPITATIONS: ICD-10-CM

## 2021-05-27 DIAGNOSIS — R21 RASH AND OTHER NONSPECIFIC SKIN ERUPTION: ICD-10-CM

## 2021-05-27 DIAGNOSIS — R19.7 DIARRHEA, UNSPECIFIED: ICD-10-CM

## 2021-05-27 DIAGNOSIS — Z90.49 ACQUIRED ABSENCE OF OTHER SPECIFIED PARTS OF DIGESTIVE TRACT: Chronic | ICD-10-CM

## 2021-05-27 DIAGNOSIS — M54.5 LOW BACK PAIN: ICD-10-CM

## 2021-05-27 DIAGNOSIS — G89.29 OTHER CHRONIC PAIN: ICD-10-CM

## 2021-05-27 DIAGNOSIS — Z98.890 OTHER SPECIFIED POSTPROCEDURAL STATES: Chronic | ICD-10-CM

## 2021-05-27 DIAGNOSIS — I10 ESSENTIAL (PRIMARY) HYPERTENSION: ICD-10-CM

## 2021-05-27 DIAGNOSIS — E11.9 TYPE 2 DIABETES MELLITUS WITHOUT COMPLICATIONS: ICD-10-CM

## 2021-05-27 DIAGNOSIS — Z79.891 LONG TERM (CURRENT) USE OF OPIATE ANALGESIC: ICD-10-CM

## 2021-05-27 LAB
ALBUMIN SERPL ELPH-MCNC: 3.9 G/DL — SIGNIFICANT CHANGE UP (ref 3.3–5)
ALP SERPL-CCNC: 63 U/L — SIGNIFICANT CHANGE UP (ref 40–120)
ALT FLD-CCNC: 20 U/L — SIGNIFICANT CHANGE UP (ref 12–78)
ANION GAP SERPL CALC-SCNC: 9 MMOL/L — SIGNIFICANT CHANGE UP (ref 5–17)
APTT BLD: 34.1 SEC — SIGNIFICANT CHANGE UP (ref 27.5–35.5)
AST SERPL-CCNC: 20 U/L — SIGNIFICANT CHANGE UP (ref 15–37)
BASOPHILS # BLD AUTO: 0.03 K/UL — SIGNIFICANT CHANGE UP (ref 0–0.2)
BASOPHILS NFR BLD AUTO: 0.4 % — SIGNIFICANT CHANGE UP (ref 0–2)
BILIRUB SERPL-MCNC: 0.5 MG/DL — SIGNIFICANT CHANGE UP (ref 0.2–1.2)
BUN SERPL-MCNC: 6 MG/DL — LOW (ref 7–23)
CALCIUM SERPL-MCNC: 9.5 MG/DL — SIGNIFICANT CHANGE UP (ref 8.5–10.1)
CHLORIDE SERPL-SCNC: 102 MMOL/L — SIGNIFICANT CHANGE UP (ref 96–108)
CO2 SERPL-SCNC: 27 MMOL/L — SIGNIFICANT CHANGE UP (ref 22–31)
CREAT SERPL-MCNC: 0.75 MG/DL — SIGNIFICANT CHANGE UP (ref 0.5–1.3)
EOSINOPHIL # BLD AUTO: 0.05 K/UL — SIGNIFICANT CHANGE UP (ref 0–0.5)
EOSINOPHIL NFR BLD AUTO: 0.7 % — SIGNIFICANT CHANGE UP (ref 0–6)
GLUCOSE SERPL-MCNC: 107 MG/DL — HIGH (ref 70–99)
HCG SERPL-ACNC: <1 MIU/ML — SIGNIFICANT CHANGE UP
HCT VFR BLD CALC: 42.4 % — SIGNIFICANT CHANGE UP (ref 34.5–45)
HGB BLD-MCNC: 14.6 G/DL — SIGNIFICANT CHANGE UP (ref 11.5–15.5)
IMM GRANULOCYTES NFR BLD AUTO: 0.1 % — SIGNIFICANT CHANGE UP (ref 0–1.5)
INR BLD: 1.12 RATIO — SIGNIFICANT CHANGE UP (ref 0.88–1.16)
LYMPHOCYTES # BLD AUTO: 2.84 K/UL — SIGNIFICANT CHANGE UP (ref 1–3.3)
LYMPHOCYTES # BLD AUTO: 41.9 % — SIGNIFICANT CHANGE UP (ref 13–44)
MAGNESIUM SERPL-MCNC: 1.9 MG/DL — SIGNIFICANT CHANGE UP (ref 1.6–2.6)
MCHC RBC-ENTMCNC: 31 PG — SIGNIFICANT CHANGE UP (ref 27–34)
MCHC RBC-ENTMCNC: 34.4 GM/DL — SIGNIFICANT CHANGE UP (ref 32–36)
MCV RBC AUTO: 90 FL — SIGNIFICANT CHANGE UP (ref 80–100)
MONOCYTES # BLD AUTO: 0.64 K/UL — SIGNIFICANT CHANGE UP (ref 0–0.9)
MONOCYTES NFR BLD AUTO: 9.4 % — SIGNIFICANT CHANGE UP (ref 2–14)
NEUTROPHILS # BLD AUTO: 3.21 K/UL — SIGNIFICANT CHANGE UP (ref 1.8–7.4)
NEUTROPHILS NFR BLD AUTO: 47.5 % — SIGNIFICANT CHANGE UP (ref 43–77)
PLATELET # BLD AUTO: 279 K/UL — SIGNIFICANT CHANGE UP (ref 150–400)
POTASSIUM SERPL-MCNC: 3.1 MMOL/L — LOW (ref 3.5–5.3)
POTASSIUM SERPL-SCNC: 3.1 MMOL/L — LOW (ref 3.5–5.3)
PROT SERPL-MCNC: 8 GM/DL — SIGNIFICANT CHANGE UP (ref 6–8.3)
PROTHROM AB SERPL-ACNC: 13 SEC — SIGNIFICANT CHANGE UP (ref 10.6–13.6)
RBC # BLD: 4.71 M/UL — SIGNIFICANT CHANGE UP (ref 3.8–5.2)
RBC # FLD: 15.9 % — HIGH (ref 10.3–14.5)
SODIUM SERPL-SCNC: 138 MMOL/L — SIGNIFICANT CHANGE UP (ref 135–145)
TROPONIN I SERPL-MCNC: <0.015 NG/ML — SIGNIFICANT CHANGE UP (ref 0.01–0.04)
WBC # BLD: 6.78 K/UL — SIGNIFICANT CHANGE UP (ref 3.8–10.5)
WBC # FLD AUTO: 6.78 K/UL — SIGNIFICANT CHANGE UP (ref 3.8–10.5)

## 2021-05-27 PROCEDURE — 99284 EMERGENCY DEPT VISIT MOD MDM: CPT

## 2021-05-27 PROCEDURE — 83735 ASSAY OF MAGNESIUM: CPT

## 2021-05-27 PROCEDURE — 93005 ELECTROCARDIOGRAM TRACING: CPT

## 2021-05-27 PROCEDURE — 71046 X-RAY EXAM CHEST 2 VIEWS: CPT

## 2021-05-27 PROCEDURE — 85610 PROTHROMBIN TIME: CPT

## 2021-05-27 PROCEDURE — 84702 CHORIONIC GONADOTROPIN TEST: CPT

## 2021-05-27 PROCEDURE — 80053 COMPREHEN METABOLIC PANEL: CPT

## 2021-05-27 PROCEDURE — 36415 COLL VENOUS BLD VENIPUNCTURE: CPT

## 2021-05-27 PROCEDURE — 85730 THROMBOPLASTIN TIME PARTIAL: CPT

## 2021-05-27 PROCEDURE — 99284 EMERGENCY DEPT VISIT MOD MDM: CPT | Mod: 25

## 2021-05-27 PROCEDURE — 71046 X-RAY EXAM CHEST 2 VIEWS: CPT | Mod: 26

## 2021-05-27 PROCEDURE — 93010 ELECTROCARDIOGRAM REPORT: CPT

## 2021-05-27 PROCEDURE — 86901 BLOOD TYPING SEROLOGIC RH(D): CPT

## 2021-05-27 PROCEDURE — 84484 ASSAY OF TROPONIN QUANT: CPT

## 2021-05-27 PROCEDURE — 85025 COMPLETE CBC W/AUTO DIFF WBC: CPT

## 2021-05-27 PROCEDURE — 86850 RBC ANTIBODY SCREEN: CPT

## 2021-05-27 PROCEDURE — 86900 BLOOD TYPING SEROLOGIC ABO: CPT

## 2021-05-27 RX ORDER — SODIUM CHLORIDE 9 MG/ML
1000 INJECTION INTRAMUSCULAR; INTRAVENOUS; SUBCUTANEOUS ONCE
Refills: 0 | Status: COMPLETED | OUTPATIENT
Start: 2021-05-27 | End: 2021-05-27

## 2021-05-27 RX ORDER — POTASSIUM CHLORIDE 20 MEQ
40 PACKET (EA) ORAL ONCE
Refills: 0 | Status: COMPLETED | OUTPATIENT
Start: 2021-05-27 | End: 2021-05-27

## 2021-05-27 RX ORDER — HYDRALAZINE HCL 50 MG
50 TABLET ORAL ONCE
Refills: 0 | Status: COMPLETED | OUTPATIENT
Start: 2021-05-27 | End: 2021-05-27

## 2021-05-27 RX ADMIN — SODIUM CHLORIDE 1000 MILLILITER(S): 9 INJECTION INTRAMUSCULAR; INTRAVENOUS; SUBCUTANEOUS at 21:14

## 2021-05-27 RX ADMIN — Medication 50 MILLIGRAM(S): at 22:06

## 2021-05-27 RX ADMIN — Medication 40 MILLIEQUIVALENT(S): at 21:41

## 2021-05-27 NOTE — ED STATDOCS - PATIENT PORTAL LINK FT
You can access the FollowMyHealth Patient Portal offered by [facility] by registering at the following website: [email or web address]. By joining [employer]’s FollowMyHealth portal, you will also be able to view your health information using other applications (apps) compatible with our system.

## 2021-05-27 NOTE — ED ADULT NURSE NOTE - NSIMPLEMENTINTERV_GEN_ALL_ED
Implemented All Fall Risk Interventions:  [place] to call system. Call bell, personal items and telephone within reach. Instruct patient to call for assistance. Room bathroom lighting operational. Non-slip footwear when patient is off stretcher. Physically safe environment: no spills, clutter or unnecessary equipment. Stretcher in lowest position, wheels locked, appropriate side rails in place. Provide visual cue, wrist band, yellow gown, etc. Monitor gait and stability. Monitor for mental status changes and reorient to person, place, and time. Review medications for side effects contributing to fall risk. Reinforce activity limits and safety measures with patient and family.

## 2021-05-27 NOTE — ED STATDOCS - PROGRESS NOTE DETAILS
Patient seen and evaluated.  Feeling much better.  REplenished potassium.  Requesting PM dose of HTN med.  Reviewed PMD f/u and return precautions -Tamara Albarran PA-C

## 2021-05-27 NOTE — ED ADULT NURSE NOTE - OBJECTIVE STATEMENT
patient c/o palpitations x1 week, worsening today. Pt reports "heart racing" Denies chest pain, fever. No other complaints at this time.

## 2021-05-27 NOTE — ED STATDOCS - ATTENDING CONTRIBUTION TO CARE
Attending Contribution to Care: I, Nataliia Anaya, performed the initial face to face bedside interview with this patient regarding history of present illness, review of symptoms and relevant past medical, social and family history.  I completed an independent physical examination.  I was the initial provider who evaluated this patient. I have signed out the follow up of any pending tests (i.e. labs, radiological studies) to the ACP.  I have communicated the patient’s plan of care and disposition with the ACP.

## 2021-05-27 NOTE — ED ADULT NURSE NOTE - CHIEF COMPLAINT QUOTE
Pt c/o "heart racing" x 1 week, worse today. Pt states history of hypokalemia. Denies chest pain or sob.
01-Nov-2017 16:12

## 2021-05-27 NOTE — ED STATDOCS - CLINICAL SUMMARY MEDICAL DECISION MAKING FREE TEXT BOX
45 y/o female with chronic co-morbidities presents c/o heart racing and palpitations. EKG, chest XR, and cardiac labs including troponin and electrolytes to be sent. Pt to be hydrated in ED and re-evaluated.

## 2021-05-27 NOTE — ED ADULT TRIAGE NOTE - CHIEF COMPLAINT QUOTE
Pt c/o "heart racing" x 1 week, worse today. Pt states history of hypokalemia. Denies chest pain or sob.

## 2021-05-27 NOTE — ED ADULT TRIAGE NOTE - DOMESTIC TRAVEL HIGH RISK QUESTION
No
Zac Miranda)  Neurology; Neuromuscular Medicine  [address], [address] [place] [address]  Phone: [phone]  Fax: [phone]  Follow Up Time:

## 2021-05-27 NOTE — ED STATDOCS - ENMT, MLM
Nasal mucosa clear.  Mouth with +dry mucous membranes, Throat has no vesicles, no oropharyngeal exudates and uvula is midline.

## 2021-05-27 NOTE — ED STATDOCS - OBJECTIVE STATEMENT
45 y/o female with a PMHx of sciatica, chronic back pain (s/p back injury, walks with a walker at baseline) DM, HTN, hypokalemia, presents to the ED c/o palpitations x1 week, worsening today. Pt reports "heart racing" sensation. Pt states she was seen at [facility] on 05/17/2021 c/o rash, prescribed antibiotics, states had 2 doses left but stopped taking due to abdominal discomfort. Notes associated diarrhea. Denies chest pain, fever. No other complaints at this time.

## 2022-02-03 NOTE — ED ADULT TRIAGE NOTE - SPO2 (%)
2/3/22 - Returned pt's call  Pt said she wanted to reschedule a test  I left her a message giving her the [phone] Central Scheduling number to reschedule her PFT she no showed on 1/27/22 
99

## 2022-02-14 NOTE — ED ADULT NURSE REASSESSMENT NOTE - NS ED NURSE REASSESS COMMENT FT1
PT refusing valium.  MRI results explained to PT. PT refusing valium. PT requesting to take her "own" percocet. RN states pt already received oxycodone and not to take home medications. MD Anthony made aware patient

## 2022-05-25 NOTE — ED ADULT TRIAGE NOTE - PAIN: PRESENCE, MLM
He never had a stroke.  This was incidental finding on an echo which was performed due to pedal edema which resolved without intervention.  No intervention is needed.   complains of pain/discomfort

## 2023-03-03 ENCOUNTER — EMERGENCY (EMERGENCY)
Facility: HOSPITAL | Age: 48
LOS: 1 days | Discharge: HOME | End: 2023-03-03
Attending: EMERGENCY MEDICINE
Payer: COMMERCIAL

## 2023-03-03 VITALS
DIASTOLIC BLOOD PRESSURE: 77 MMHG | OXYGEN SATURATION: 97 % | RESPIRATION RATE: 16 BRPM | WEIGHT: 179.9 LBS | TEMPERATURE: 99 F | HEART RATE: 100 BPM | HEIGHT: 68 IN | SYSTOLIC BLOOD PRESSURE: 137 MMHG

## 2023-03-03 VITALS
DIASTOLIC BLOOD PRESSURE: 75 MMHG | TEMPERATURE: 98 F | RESPIRATION RATE: 16 BRPM | HEART RATE: 85 BPM | OXYGEN SATURATION: 95 % | SYSTOLIC BLOOD PRESSURE: 136 MMHG

## 2023-03-03 DIAGNOSIS — Z98.890 OTHER SPECIFIED POSTPROCEDURAL STATES: Chronic | ICD-10-CM

## 2023-03-03 DIAGNOSIS — Z90.49 ACQUIRED ABSENCE OF OTHER SPECIFIED PARTS OF DIGESTIVE TRACT: Chronic | ICD-10-CM

## 2023-03-03 LAB
ALBUMIN SERPL ELPH-MCNC: 3.9 G/DL — SIGNIFICANT CHANGE UP (ref 3.3–5.2)
ALP SERPL-CCNC: 81 U/L — SIGNIFICANT CHANGE UP (ref 40–120)
ALT FLD-CCNC: 7 U/L — SIGNIFICANT CHANGE UP
ANION GAP SERPL CALC-SCNC: 3 MMOL/L — LOW (ref 5–17)
AST SERPL-CCNC: 20 U/L — SIGNIFICANT CHANGE UP
BASOPHILS # BLD AUTO: 0.04 K/UL — SIGNIFICANT CHANGE UP (ref 0–0.2)
BASOPHILS NFR BLD AUTO: 0.5 % — SIGNIFICANT CHANGE UP (ref 0–2)
BILIRUB SERPL-MCNC: 0.4 MG/DL — SIGNIFICANT CHANGE UP (ref 0.4–2)
BUN SERPL-MCNC: 12.3 MG/DL — SIGNIFICANT CHANGE UP (ref 8–20)
CALCIUM SERPL-MCNC: 9.1 MG/DL — SIGNIFICANT CHANGE UP (ref 8.4–10.5)
CHLORIDE SERPL-SCNC: 104 MMOL/L — SIGNIFICANT CHANGE UP (ref 96–108)
CO2 SERPL-SCNC: 25 MMOL/L — SIGNIFICANT CHANGE UP (ref 22–29)
CREAT SERPL-MCNC: 0.64 MG/DL — SIGNIFICANT CHANGE UP (ref 0.5–1.3)
EGFR: 111 ML/MIN/1.73M2 — SIGNIFICANT CHANGE UP
EOSINOPHIL # BLD AUTO: 0.35 K/UL — SIGNIFICANT CHANGE UP (ref 0–0.5)
EOSINOPHIL NFR BLD AUTO: 4.1 % — SIGNIFICANT CHANGE UP (ref 0–6)
GLUCOSE SERPL-MCNC: 149 MG/DL — HIGH (ref 70–99)
HCG SERPL-ACNC: <4 MIU/ML — SIGNIFICANT CHANGE UP
HCT VFR BLD CALC: 41.2 % — SIGNIFICANT CHANGE UP (ref 34.5–45)
HGB BLD-MCNC: 14.2 G/DL — SIGNIFICANT CHANGE UP (ref 11.5–15.5)
HIV 1 & 2 AB SERPL IA.RAPID: SIGNIFICANT CHANGE UP
IMM GRANULOCYTES NFR BLD AUTO: 0.2 % — SIGNIFICANT CHANGE UP (ref 0–0.9)
LYMPHOCYTES # BLD AUTO: 1.53 K/UL — SIGNIFICANT CHANGE UP (ref 1–3.3)
LYMPHOCYTES # BLD AUTO: 18 % — SIGNIFICANT CHANGE UP (ref 13–44)
MCHC RBC-ENTMCNC: 31.8 PG — SIGNIFICANT CHANGE UP (ref 27–34)
MCHC RBC-ENTMCNC: 34.5 GM/DL — SIGNIFICANT CHANGE UP (ref 32–36)
MCV RBC AUTO: 92.4 FL — SIGNIFICANT CHANGE UP (ref 80–100)
MONOCYTES # BLD AUTO: 0.76 K/UL — SIGNIFICANT CHANGE UP (ref 0–0.9)
MONOCYTES NFR BLD AUTO: 8.9 % — SIGNIFICANT CHANGE UP (ref 2–14)
NEUTROPHILS # BLD AUTO: 5.81 K/UL — SIGNIFICANT CHANGE UP (ref 1.8–7.4)
NEUTROPHILS NFR BLD AUTO: 68.3 % — SIGNIFICANT CHANGE UP (ref 43–77)
PLATELET # BLD AUTO: 288 K/UL — SIGNIFICANT CHANGE UP (ref 150–400)
POTASSIUM SERPL-MCNC: 3.6 MMOL/L — SIGNIFICANT CHANGE UP (ref 3.5–5.3)
POTASSIUM SERPL-SCNC: 3.6 MMOL/L — SIGNIFICANT CHANGE UP (ref 3.5–5.3)
PROT SERPL-MCNC: 7.7 G/DL — SIGNIFICANT CHANGE UP (ref 6.6–8.7)
RBC # BLD: 4.46 M/UL — SIGNIFICANT CHANGE UP (ref 3.8–5.2)
RBC # FLD: 13.8 % — SIGNIFICANT CHANGE UP (ref 10.3–14.5)
SODIUM SERPL-SCNC: 132 MMOL/L — LOW (ref 135–145)
WBC # BLD: 8.51 K/UL — SIGNIFICANT CHANGE UP (ref 3.8–10.5)
WBC # FLD AUTO: 8.51 K/UL — SIGNIFICANT CHANGE UP (ref 3.8–10.5)

## 2023-03-03 PROCEDURE — 96360 HYDRATION IV INFUSION INIT: CPT

## 2023-03-03 PROCEDURE — 80053 COMPREHEN METABOLIC PANEL: CPT

## 2023-03-03 PROCEDURE — 99284 EMERGENCY DEPT VISIT MOD MDM: CPT

## 2023-03-03 PROCEDURE — 84702 CHORIONIC GONADOTROPIN TEST: CPT

## 2023-03-03 PROCEDURE — 99283 EMERGENCY DEPT VISIT LOW MDM: CPT | Mod: 25

## 2023-03-03 PROCEDURE — 86703 HIV-1/HIV-2 1 RESULT ANTBDY: CPT

## 2023-03-03 PROCEDURE — 93010 ELECTROCARDIOGRAM REPORT: CPT

## 2023-03-03 PROCEDURE — 36415 COLL VENOUS BLD VENIPUNCTURE: CPT

## 2023-03-03 PROCEDURE — 93005 ELECTROCARDIOGRAM TRACING: CPT

## 2023-03-03 PROCEDURE — 85025 COMPLETE CBC W/AUTO DIFF WBC: CPT

## 2023-03-03 RX ORDER — SODIUM CHLORIDE 9 MG/ML
1000 INJECTION INTRAMUSCULAR; INTRAVENOUS; SUBCUTANEOUS ONCE
Refills: 0 | Status: COMPLETED | OUTPATIENT
Start: 2023-03-03 | End: 2023-03-03

## 2023-03-03 RX ADMIN — SODIUM CHLORIDE 1000 MILLILITER(S): 9 INJECTION INTRAMUSCULAR; INTRAVENOUS; SUBCUTANEOUS at 15:10

## 2023-03-03 NOTE — ED PROVIDER NOTE - PROGRESS NOTE DETAILS
PT evaluated by intake physician. HPI/ROS/PE as noted above.     Patient p/w diarrhea x 3 days. Lab results reviewed: Na 132, K wnl; CBC wnl. Stool panel not sent, patient has not had BM in ED - will f/u outpatient. UA not indicated at this time - patient denies dysuria, hematuria or other  symptoms. Patient re-examined: abdomen soft, non-tender. Patient reports improvement in symptoms. No further ED workup indicated at this time. Supportive care. Follow up with PCP. Return precautions discussed. Patient verbally demonstrated understanding of results and plan. Patient stable for discharge.

## 2023-03-03 NOTE — ED PROVIDER NOTE - CLINICAL SUMMARY MEDICAL DECISION MAKING FREE TEXT BOX
Diarrhea with concerns for hypokalemia. Labs, IV fluids, GI PCR. Diarrhea since monday with concerns for hypokalemia. Labs, IV fluids, GI PCR. 48 y/o female p/w diarrhea since Monday with concerns for hypokalemia. Labs, IV fluids, GI PCR. Lab results reviewed: Na 132, K wnl; CBC wnl. Stool panel not sent, patient has not had BM in ED - will f/u outpatient. UA not indicated at this time - patient denies dysuria, hematuria or other  symptoms. Patient re-examined: abdomen soft, non-tender. Patient reports improvement in symptoms. No further ED workup indicated at this time. Follow up with PCP. Return precautions discussed. Patient verbally demonstrated understanding of results and plan. Patient stable for discharge.

## 2023-03-03 NOTE — ED PROVIDER NOTE - NS_ ATTENDINGSCRIBEDETAILS _ED_A_ED_FT
I, Talha Dominguez, performed the initial face to face bedside interview with this patient regarding history of present illness, review of symptoms and relevant past medical, social and family history.  I completed an independent physical examination.  I was the provider who initially evaluated this patient.  The history, relevant review of systems, past medical and surgical history, medical decision making, and physical examination was documented by the scribe in my presence and I attest to the accuracy of the documentation. Follow-up on ordered tests (ie labs, radiologic studies) and re-evaluation of the patient's status has been communicated to the ACP.  Disposition of the patient will be based on test outcome and response to ED interventions.

## 2023-03-03 NOTE — ED PROVIDER NOTE - CARE PROVIDER_API CALL
Norma Davila (DO)  Gastroenterology  [address], [address]  [place] [address]  Phone: [phone]  Fax: [phone]  Follow Up Time:

## 2023-03-03 NOTE — ED PROVIDER NOTE - NSICDXPASTMEDICALHX_GEN_ALL_CORE_FT
PAST MEDICAL HISTORY:  Chronic low back pain, unspecified back pain laterality, unspecified whether sciatica present     Dental infection     Diabetes mellitus     Hypertension, unspecified type     Sciatica        no

## 2023-03-03 NOTE — ED PROVIDER NOTE - PATIENT PORTAL LINK FT
You can access the FollowMyHealth Patient Portal offered by [facility] by registering at the following website: [email or web address]. By joining [employer]’s FollowMyHealth portal, you will also be able to view your health information using other applications (apps) compatible with our system.

## 2023-03-03 NOTE — ED PROVIDER NOTE - OBJECTIVE STATEMENT
48 y/o female with PMHx of HTN and DM not on medication presents to ED c/o diarrhea for the past few days starting Monday. Pt states she has eye twitches and muscle spasms with onset of diarrhea. Pt states 'I think I am hypokalemic". No recent antibiotic use, fever, abdominal pain major surgeries other than gallbladder removal and hysterectomy. Allergy to amoxicillin and Penicillin. Pt is an everyday smoker, no etoh use. 46 y/o female with PMHx of HTN and DM not on medication presents to ED c/o watery stools once a day since monday with assoc muscle spasm and eye twitching; concerned about being hypokalemic.  Denies fever.  Denies abd pain.  Denies blood in stool.  No recent antibiotic use or travel.  Allergy to amoxicillin and Penicillin. Pt is an everyday smoker, no etoh use.

## 2023-03-03 NOTE — ED ADULT NURSE NOTE - OBJECTIVE STATEMENT
Pt is here with c/o diarrhea for the past 3 days and states she feels  hypokalemic.  C/o diarrhea, cramping and nausea with loss of appetite. Denies any other symptoms.  #20G IV inserted to RFA, fluids infusing without s/s redness or swelling noted.

## 2023-03-03 NOTE — ED PROVIDER NOTE - NSFOLLOWUPINSTRUCTIONS_ED_ALL_ED_FT
Stay hydrated and keep a bland diet. Call to make an appointment to follow up wit your primary care provider and the gastroenterologist. Return to ER if symptoms worsen including but not limited to dizziness, shortness of breath, chest pain, severe abdominal pain, excessive vomiting or other worsening symptoms.

## 2023-03-03 NOTE — ED ADULT NURSE NOTE - NSICDXPASTMEDICALHX_GEN_ALL_CORE_FT
PAST MEDICAL HISTORY:  Chronic low back pain, unspecified back pain laterality, unspecified whether sciatica present     Dental infection     Diabetes mellitus     Hypertension, unspecified type     Sciatica

## 2023-05-10 NOTE — ED STATDOCS - RESPIRATORY, MLM
Pt complains of nasal congestion and left ear pain and fullness that started roughly 4-5 days ago. Denies fever.
breath sounds clear and equal bilaterally.

## 2023-05-16 ENCOUNTER — APPOINTMENT (OUTPATIENT)
Dept: INTERNAL MEDICINE | Facility: CLINIC | Age: 48
End: 2023-05-16
Payer: MEDICAID

## 2023-05-16 VITALS
SYSTOLIC BLOOD PRESSURE: 120 MMHG | HEIGHT: 63 IN | OXYGEN SATURATION: 98 % | WEIGHT: 220 LBS | TEMPERATURE: 97.7 F | BODY MASS INDEX: 38.98 KG/M2 | DIASTOLIC BLOOD PRESSURE: 80 MMHG | HEART RATE: 74 BPM

## 2023-05-16 DIAGNOSIS — A49.01 METHICILLIN SUSCEPTIBLE STAPHYLOCOCCUS AUREUS INFECTION, UNSPECIFIED SITE: ICD-10-CM

## 2023-05-16 DIAGNOSIS — T81.49XA INFECTION FOLLOWING A PROCEDURE, OTHER SURGICAL SITE, INITIAL ENCOUNTER: ICD-10-CM

## 2023-05-16 DIAGNOSIS — L85.3 XEROSIS CUTIS: ICD-10-CM

## 2023-05-16 PROCEDURE — 99215 OFFICE O/P EST HI 40 MIN: CPT

## 2023-05-16 NOTE — PHYSICAL EXAM
[General Appearance - Alert] : alert [General Appearance - In No Acute Distress] : in no acute distress [General Appearance - Well Nourished] : well nourished [General Appearance - Well-Appearing] : healthy appearing [Sclera] : the sclera and conjunctiva were normal [Outer Ear] : the ears and nose were normal in appearance [Hearing Threshold Finger Rub Not [place]] : hearing was normal [Neck Appearance] : the appearance of the neck was normal [Exaggerated Use Of Accessory Muscles For Inspiration] : no accessory muscle use [Heart Rate And Rhythm] : heart rate was normal and rhythm regular [Edema] : there was no peripheral edema [Musculoskeletal - Swelling] : no joint swelling [Skin Color & Pigmentation] : normal skin color and pigmentation [] : no rash [FreeTextEntry1] : Dry Skin  [Motor Exam] : the motor exam was normal [No Focal Deficits] : no focal deficits [Oriented To Time, Place, And Person] : oriented to person, place, and time [Affect] : the affect was normal

## 2023-05-16 NOTE — HISTORY OF PRESENT ILLNESS
[FreeTextEntry1] : 48-year-old female last seen here in April 2022.  Patient used to follow here after her surgery at [facility] or L4/5 decompression done on January 13, 2019.  At that time she had cauda equina syndrome and was admitted in February 2019 for wound infection from that surgery.  She had MSSA growing from the incision site and had a I&D done on February 22, 2019.  She was discharged on IV cefazolin which she completed in April 4, 2019.  She followed up with us after that time.  Stopped following up after April 21 2020.  She recently was seen at [facility] emergency room for skin peeling.  She was discharged from the emergency room after her labs and work-up was within normal limits.  Patient is concerned for any infection since she had a severe infection in 2019 and is traumatized from that.  So she decided to come here for further evaluation.  She reports no fever no chills.  Denies any night sweats.  No body aches.  She does not have any wounds after her spinal surgery.

## 2023-05-16 NOTE — ASSESSMENT
[FreeTextEntry1] : 48-year-old female here for concern for infection and recent skin peeling.\par \par \par Dry skin\par Status post MSSA postoperative wound infection\par \par Recommendations:\par Labs from the hospital visit reviewed\par Patient has dry skin on exam.\par No signs of any infection on her skin.\par Patient reports she cleaned the skin every day with 90% alcohol.\par Patient advised to stop using 90% alcohol on her skin.  She needs to use moisturizer instead.\par \par Follow-up as needed\par \par Counseling included lab results, differential diagnosis, treatment options, risks and benefits, lifestyle changes, current condition, medications and dose adjustments.\par The patient was interactive attentive and asked questions and verbalized understanding.

## 2023-05-24 ENCOUNTER — NON-APPOINTMENT (OUTPATIENT)
Age: 48
End: 2023-05-24

## 2023-05-24 ENCOUNTER — APPOINTMENT (OUTPATIENT)
Dept: ORTHOPEDIC SURGERY | Facility: CLINIC | Age: 48
End: 2023-05-24
Payer: MEDICAID

## 2023-05-24 PROCEDURE — 99214 OFFICE O/P EST MOD 30 MIN: CPT

## 2023-05-24 PROCEDURE — 73630 X-RAY EXAM OF FOOT: CPT | Mod: LT

## 2023-05-24 PROCEDURE — 73610 X-RAY EXAM OF ANKLE: CPT | Mod: LT

## 2023-05-24 PROCEDURE — 73562 X-RAY EXAM OF KNEE 3: CPT | Mod: LT

## 2023-05-24 NOTE — DISCUSSION/SUMMARY
[de-identified] : Today I had a lengthy discussion with the patient regarding their left foot pain. I have addressed all the patient's concerns surrounding the pathology of their condition. I reviewed the patient's XR imaging with them in great detail.  I encouraged the patient to quit smoking and educated them about the risks that smoking has on bone healing. She will meet with my orthotics team to discuss bracing to try and help fix her tilt, AFO vs. [place].\par I recommend the patient undergo a course of physical therapy for the left foot  2-3 times a week for a total of 8-12 weeks. A prescription was given for the physical therapy today. \par \par I have ordered an EMG. \par \par The patient understood and verbally agreed to the treatment plan. All of their questions were answered and they were satisfied with the visit. The patient should call the office if they have any questions or experience worsening symptoms. \par \par Follow up in 6 weeks.

## 2023-05-24 NOTE — HISTORY OF PRESENT ILLNESS
[FreeTextEntry1] : The patient is a 48 year old female who presents to the office for an initial evaluation of left foot pain. She has a surgical history of emergent Cauda Equina back surgery in 2020 at [facility]. She had a second surgery at the same hospital as well. \par \par Patient smokes. \par

## 2023-05-24 NOTE — PHYSICAL EXAM
[de-identified] : Left ankle Physical Examination:\par \par General: Alert and oriented x3.  In no acute distress.  Pleasant in nature with a normal affect.  No apparent respiratory distress. \par Erythema, Warmth, Rubor: Negative\par Swelling: Negative\par \par ROM:\par 1. Dorsiflexion: 0 degrees\par 2. Plantarflexion: 0 degrees\par 3. Inversion: 0 degrees\par 4. Eversion: 0 degrees\par Cannot wiggle toes\par \par Tenderness to Palpation: \par 1. Lateral Malleolus: Negative\par 2. Medial Malleolus: Negative\par 3. Proximal Fibular Pain: Negative\par 4. Heel Pain: Negative\par 5. Cuboid: Negative\par 6. Navicular: Negative\par 7. Tibiotalar Joint: Negative\par 8. Subtalar Joint: Negative\par 9. Posterior Recess: Negative\par \par Tendon Pain:\par 1. Achilles: Negative\par 2. Peroneals: Negative\par 3. Posterior Tibialis: Negative\par 4. Tibialis Anterior: Negative\par \par Ligament Pain:\par 1. ATFL: Negative\par 2. CFL: Negative \par 3. PTFL: Negative\par 4. Deltoid Ligaments: Negative\par 5. Lis Franc Ligament: Negative\par \par Stability: \par 1. Anterior Drawer: Negative\par 2. Posterior Drawer: Negative\par \par Strength: 5/5 TA/GS/EHL\par \par Pulses: 2+ DP/PT Pulses\par \par Neuro: Intact motor and sensory\par \par Additional Test:\par 1. Calcaneal Squeeze Test: Negative\par 2. Syndesmosis Squeeze Test: Negative [de-identified] : 3 views x-rays left foot, ankle, knee reviewed, 5/24/2023: No fracture seen. foot drop, cavo varus deformity

## 2023-10-23 ENCOUNTER — APPOINTMENT (OUTPATIENT)
Dept: INTERNAL MEDICINE | Facility: CLINIC | Age: 48
End: 2023-10-23
Payer: MEDICAID

## 2023-10-23 VITALS
OXYGEN SATURATION: 100 % | SYSTOLIC BLOOD PRESSURE: 116 MMHG | DIASTOLIC BLOOD PRESSURE: 77 MMHG | BODY MASS INDEX: 38.98 KG/M2 | TEMPERATURE: 96 F | HEIGHT: 63 IN | HEART RATE: 79 BPM | WEIGHT: 220 LBS

## 2023-10-23 DIAGNOSIS — S99.922S UNSPECIFIED INJURY OF LEFT FOOT, SEQUELA: ICD-10-CM

## 2023-10-23 DIAGNOSIS — R61 GENERALIZED HYPERHIDROSIS: ICD-10-CM

## 2023-10-23 PROCEDURE — 99215 OFFICE O/P EST HI 40 MIN: CPT | Mod: 25

## 2023-10-23 PROCEDURE — 36415 COLL VENOUS BLD VENIPUNCTURE: CPT

## 2023-10-24 ENCOUNTER — NON-APPOINTMENT (OUTPATIENT)
Age: 48
End: 2023-10-24

## 2023-10-24 ENCOUNTER — INPATIENT (INPATIENT)
Facility: HOSPITAL | Age: 48
LOS: 7 days | Discharge: HOME | DRG: 593 | End: 2023-11-01
Attending: INTERNAL MEDICINE | Admitting: HOSPITALIST
Payer: COMMERCIAL

## 2023-10-24 VITALS
HEIGHT: 63 IN | WEIGHT: 192.46 LBS | DIASTOLIC BLOOD PRESSURE: 81 MMHG | RESPIRATION RATE: 20 BRPM | HEART RATE: 104 BPM | SYSTOLIC BLOOD PRESSURE: 149 MMHG | OXYGEN SATURATION: 99 % | TEMPERATURE: 98 F

## 2023-10-24 DIAGNOSIS — Z98.890 OTHER SPECIFIED POSTPROCEDURAL STATES: Chronic | ICD-10-CM

## 2023-10-24 DIAGNOSIS — Z90.49 ACQUIRED ABSENCE OF OTHER SPECIFIED PARTS OF DIGESTIVE TRACT: Chronic | ICD-10-CM

## 2023-10-24 PROCEDURE — 99285 EMERGENCY DEPT VISIT HI MDM: CPT

## 2023-10-24 PROCEDURE — 73620 X-RAY EXAM OF FOOT: CPT | Mod: 26,LT

## 2023-10-24 RX ORDER — OXYCODONE HYDROCHLORIDE 5 MG/1
15 TABLET ORAL ONCE
Refills: 0 | Status: DISCONTINUED | OUTPATIENT
Start: 2023-10-24 | End: 2023-10-24

## 2023-10-24 RX ORDER — SODIUM CHLORIDE 9 MG/ML
1000 INJECTION INTRAMUSCULAR; INTRAVENOUS; SUBCUTANEOUS ONCE
Refills: 0 | Status: COMPLETED | OUTPATIENT
Start: 2023-10-24 | End: 2023-10-24

## 2023-10-24 RX ORDER — VANCOMYCIN HCL 1 G
1000 VIAL (EA) INTRAVENOUS ONCE
Refills: 0 | Status: COMPLETED | OUTPATIENT
Start: 2023-10-24 | End: 2023-10-24

## 2023-10-24 NOTE — ED PROVIDER NOTE - CLINICAL SUMMARY MEDICAL DECISION MAKING FREE TEXT BOX
Patient with bacteremia at infectious disease doctor.  Plan for labs, antibiotics, repeat blood cultures, x-ray foot and admission.

## 2023-10-24 NOTE — ED PROVIDER NOTE - PHYSICAL EXAMINATION
Gen: Well appearing in NAD  Head: NC/AT  Neck: trachea midline  Resp:  No distress, CTAB  CV: RRR  GI: soft, NTND  Ext: no deformities, no calf ttp, no LE edema  Neuro:  A&O appears non focal  Skin:  Warm and dry as visualized, left foot with minimal skin sloughing and heel, positive clear edema.  No warmth/erythema/tenderness.  Spine with well-healed lumbar surgical  incision, no erythema/warmth.  Psych:  Normal affect and mood

## 2023-10-24 NOTE — ED PROVIDER NOTE - OBJECTIVE STATEMENT
48-year-old female history of chronic back pain secondary to cauda equina complicated by back infection at [facility] in 2019 sent in by infectious disease for bacteremia.  Patient endorses chills for the last month, continued chronic back pain for the last few years, weeping from her left foot for the last month.  Saw Dr. Nicholson (infectious disease) yesterday and was called to come to the ED for possible cultures.  Patient grew gram-positive in clusters in both bottles.  Patient denies dysuria, hematuria, cough, congestion, sick contacts, new weakness/numbness/tingling. 48-year-old female history of chronic back pain secondary to cauda equina complicated by back infection at [facility] in 2019 sent in by infectious disease for bacteremia.  Patient endorses chills for the last month, continued chronic back pain for the last few years, weeping from her left foot for the last month.  Saw Dr. Mccabe (infectious disease) yesterday and was called to come to the ED for possible cultures.  Patient grew gram-positive in clusters in both bottles.  Patient denies dysuria, hematuria, cough, congestion, sick contacts, new weakness/numbness/tingling.

## 2023-10-24 NOTE — ED ADULT TRIAGE NOTE - CHIEF COMPLAINT QUOTE
Patient presents sent to ED by Dr. Gardiner for abnormal lab results.  Patient with left foot injury times a couple of weeks with active drainage.  Per patient, has had ongoing left foot infection since 2020

## 2023-10-24 NOTE — ED PROVIDER NOTE - ATTENDING CONTRIBUTION TO CARE
Jasmin: I performed a face to face bedside interview with patient regarding history of present illness, review of symptoms and past medical history. I completed an independent physical exam and ordered tests/medications as needed.  I have discussed patient's plan of care with the resident. The resident assisted in  executing the discussed plan. I was available for any questions or issues that may have arose during the execution of the plan of care.

## 2023-10-25 DIAGNOSIS — R78.81 BACTEREMIA: ICD-10-CM

## 2023-10-25 LAB
ALBUMIN SERPL ELPH-MCNC: 3.8 G/DL — SIGNIFICANT CHANGE UP (ref 3.3–5.2)
ALBUMIN SERPL ELPH-MCNC: 3.8 G/DL — SIGNIFICANT CHANGE UP (ref 3.3–5.2)
ALBUMIN SERPL ELPH-MCNC: 4.1 G/DL — SIGNIFICANT CHANGE UP (ref 3.3–5.2)
ALBUMIN SERPL ELPH-MCNC: 4.1 G/DL — SIGNIFICANT CHANGE UP (ref 3.3–5.2)
ALBUMIN SERPL ELPH-MCNC: 4.8 G/DL
ALP BLD-CCNC: 83 U/L
ALP SERPL-CCNC: 62 U/L — SIGNIFICANT CHANGE UP (ref 40–120)
ALP SERPL-CCNC: 62 U/L — SIGNIFICANT CHANGE UP (ref 40–120)
ALP SERPL-CCNC: 70 U/L — SIGNIFICANT CHANGE UP (ref 40–120)
ALP SERPL-CCNC: 70 U/L — SIGNIFICANT CHANGE UP (ref 40–120)
ALT FLD-CCNC: 5 U/L — SIGNIFICANT CHANGE UP
ALT FLD-CCNC: 5 U/L — SIGNIFICANT CHANGE UP
ALT FLD-CCNC: 6 U/L — SIGNIFICANT CHANGE UP
ALT FLD-CCNC: 6 U/L — SIGNIFICANT CHANGE UP
ALT SERPL-CCNC: 7 U/L
ANION GAP SERPL CALC-SCNC: 11 MMOL/L — SIGNIFICANT CHANGE UP (ref 5–17)
ANION GAP SERPL CALC-SCNC: 11 MMOL/L — SIGNIFICANT CHANGE UP (ref 5–17)
ANION GAP SERPL CALC-SCNC: 14 MMOL/L — SIGNIFICANT CHANGE UP (ref 5–17)
ANION GAP SERPL CALC-SCNC: 14 MMOL/L — SIGNIFICANT CHANGE UP (ref 5–17)
ANION GAP SERPL CALC-SCNC: 17 MMOL/L
AST SERPL-CCNC: 12 U/L — SIGNIFICANT CHANGE UP
AST SERPL-CCNC: 12 U/L — SIGNIFICANT CHANGE UP
AST SERPL-CCNC: 14 U/L — SIGNIFICANT CHANGE UP
AST SERPL-CCNC: 14 U/L — SIGNIFICANT CHANGE UP
AST SERPL-CCNC: 18 U/L
BACTERIA BLD CULT: ABNORMAL
BASE EXCESS BLDV CALC-SCNC: 4.2 MMOL/L — HIGH (ref -2–3)
BASE EXCESS BLDV CALC-SCNC: 4.2 MMOL/L — HIGH (ref -2–3)
BASOPHILS # BLD AUTO: 0.03 K/UL — SIGNIFICANT CHANGE UP (ref 0–0.2)
BASOPHILS # BLD AUTO: 0.03 K/UL — SIGNIFICANT CHANGE UP (ref 0–0.2)
BASOPHILS # BLD AUTO: 0.04 K/UL — SIGNIFICANT CHANGE UP (ref 0–0.2)
BASOPHILS # BLD AUTO: 0.04 K/UL — SIGNIFICANT CHANGE UP (ref 0–0.2)
BASOPHILS NFR BLD AUTO: 0.4 % — SIGNIFICANT CHANGE UP (ref 0–2)
BASOPHILS NFR BLD AUTO: 0.4 % — SIGNIFICANT CHANGE UP (ref 0–2)
BASOPHILS NFR BLD AUTO: 0.7 % — SIGNIFICANT CHANGE UP (ref 0–2)
BASOPHILS NFR BLD AUTO: 0.7 % — SIGNIFICANT CHANGE UP (ref 0–2)
BILIRUB SERPL-MCNC: 0.2 MG/DL
BILIRUB SERPL-MCNC: 0.3 MG/DL — LOW (ref 0.4–2)
BUN SERPL-MCNC: 10 MG/DL
BUN SERPL-MCNC: 5.9 MG/DL — LOW (ref 8–20)
BUN SERPL-MCNC: 5.9 MG/DL — LOW (ref 8–20)
BUN SERPL-MCNC: 7.3 MG/DL — LOW (ref 8–20)
BUN SERPL-MCNC: 7.3 MG/DL — LOW (ref 8–20)
CA-I SERPL-SCNC: 1.14 MMOL/L — LOW (ref 1.15–1.33)
CA-I SERPL-SCNC: 1.14 MMOL/L — LOW (ref 1.15–1.33)
CALCIUM SERPL-MCNC: 8.7 MG/DL — SIGNIFICANT CHANGE UP (ref 8.4–10.5)
CALCIUM SERPL-MCNC: 8.7 MG/DL — SIGNIFICANT CHANGE UP (ref 8.4–10.5)
CALCIUM SERPL-MCNC: 9.1 MG/DL — SIGNIFICANT CHANGE UP (ref 8.4–10.5)
CALCIUM SERPL-MCNC: 9.1 MG/DL — SIGNIFICANT CHANGE UP (ref 8.4–10.5)
CALCIUM SERPL-MCNC: 9.7 MG/DL
CHLORIDE BLDV-SCNC: 102 MMOL/L — SIGNIFICANT CHANGE UP (ref 96–108)
CHLORIDE BLDV-SCNC: 102 MMOL/L — SIGNIFICANT CHANGE UP (ref 96–108)
CHLORIDE SERPL-SCNC: 102 MMOL/L — SIGNIFICANT CHANGE UP (ref 96–108)
CHLORIDE SERPL-SCNC: 102 MMOL/L — SIGNIFICANT CHANGE UP (ref 96–108)
CHLORIDE SERPL-SCNC: 103 MMOL/L
CHLORIDE SERPL-SCNC: 98 MMOL/L — SIGNIFICANT CHANGE UP (ref 96–108)
CHLORIDE SERPL-SCNC: 98 MMOL/L — SIGNIFICANT CHANGE UP (ref 96–108)
CO2 SERPL-SCNC: 20 MMOL/L
CO2 SERPL-SCNC: 26 MMOL/L — SIGNIFICANT CHANGE UP (ref 22–29)
CO2 SERPL-SCNC: 26 MMOL/L — SIGNIFICANT CHANGE UP (ref 22–29)
CO2 SERPL-SCNC: 27 MMOL/L — SIGNIFICANT CHANGE UP (ref 22–29)
CO2 SERPL-SCNC: 27 MMOL/L — SIGNIFICANT CHANGE UP (ref 22–29)
CREAT SERPL-MCNC: 0.73 MG/DL — SIGNIFICANT CHANGE UP (ref 0.5–1.3)
CREAT SERPL-MCNC: 0.73 MG/DL — SIGNIFICANT CHANGE UP (ref 0.5–1.3)
CREAT SERPL-MCNC: 0.76 MG/DL
CREAT SERPL-MCNC: 0.82 MG/DL — SIGNIFICANT CHANGE UP (ref 0.5–1.3)
CREAT SERPL-MCNC: 0.82 MG/DL — SIGNIFICANT CHANGE UP (ref 0.5–1.3)
CRP SERPL-MCNC: 5 MG/L — HIGH
CRP SERPL-MCNC: 5 MG/L — HIGH
EGFR: 103 ML/MIN/1.73M2 — SIGNIFICANT CHANGE UP
EGFR: 103 ML/MIN/1.73M2 — SIGNIFICANT CHANGE UP
EGFR: 89 ML/MIN/1.73M2 — SIGNIFICANT CHANGE UP
EGFR: 89 ML/MIN/1.73M2 — SIGNIFICANT CHANGE UP
EGFR: 98 ML/MIN/1.73M2
EOSINOPHIL # BLD AUTO: 0.1 K/UL — SIGNIFICANT CHANGE UP (ref 0–0.5)
EOSINOPHIL # BLD AUTO: 0.1 K/UL — SIGNIFICANT CHANGE UP (ref 0–0.5)
EOSINOPHIL # BLD AUTO: 0.11 K/UL — SIGNIFICANT CHANGE UP (ref 0–0.5)
EOSINOPHIL # BLD AUTO: 0.11 K/UL — SIGNIFICANT CHANGE UP (ref 0–0.5)
EOSINOPHIL NFR BLD AUTO: 1.5 % — SIGNIFICANT CHANGE UP (ref 0–6)
EOSINOPHIL NFR BLD AUTO: 1.5 % — SIGNIFICANT CHANGE UP (ref 0–6)
EOSINOPHIL NFR BLD AUTO: 1.9 % — SIGNIFICANT CHANGE UP (ref 0–6)
EOSINOPHIL NFR BLD AUTO: 1.9 % — SIGNIFICANT CHANGE UP (ref 0–6)
ERYTHROCYTE [SEDIMENTATION RATE] IN BLOOD: 40 MM/HR — HIGH (ref 0–20)
ERYTHROCYTE [SEDIMENTATION RATE] IN BLOOD: 40 MM/HR — HIGH (ref 0–20)
GAS PNL BLDV: 136 MMOL/L — SIGNIFICANT CHANGE UP (ref 136–145)
GAS PNL BLDV: 136 MMOL/L — SIGNIFICANT CHANGE UP (ref 136–145)
GAS PNL BLDV: SIGNIFICANT CHANGE UP
GLUCOSE BLDV-MCNC: 107 MG/DL — HIGH (ref 70–99)
GLUCOSE BLDV-MCNC: 107 MG/DL — HIGH (ref 70–99)
GLUCOSE SERPL-MCNC: 102 MG/DL — HIGH (ref 70–99)
GLUCOSE SERPL-MCNC: 102 MG/DL — HIGH (ref 70–99)
GLUCOSE SERPL-MCNC: 137 MG/DL — HIGH (ref 70–99)
GLUCOSE SERPL-MCNC: 137 MG/DL — HIGH (ref 70–99)
GLUCOSE SERPL-MCNC: 89 MG/DL
HCG SERPL-ACNC: <4 MIU/ML — SIGNIFICANT CHANGE UP
HCG SERPL-ACNC: <4 MIU/ML — SIGNIFICANT CHANGE UP
HCO3 BLDV-SCNC: 30 MMOL/L — HIGH (ref 22–29)
HCO3 BLDV-SCNC: 30 MMOL/L — HIGH (ref 22–29)
HCT VFR BLD CALC: 39.8 % — SIGNIFICANT CHANGE UP (ref 34.5–45)
HCT VFR BLD CALC: 39.8 % — SIGNIFICANT CHANGE UP (ref 34.5–45)
HCT VFR BLD CALC: 40.6 % — SIGNIFICANT CHANGE UP (ref 34.5–45)
HCT VFR BLD CALC: 40.6 % — SIGNIFICANT CHANGE UP (ref 34.5–45)
HCT VFR BLD CALC: 48.2 %
HCT VFR BLDA CALC: 42 % — SIGNIFICANT CHANGE UP
HCT VFR BLDA CALC: 42 % — SIGNIFICANT CHANGE UP
HGB BLD CALC-MCNC: 14.1 G/DL — SIGNIFICANT CHANGE UP (ref 11.7–16.1)
HGB BLD CALC-MCNC: 14.1 G/DL — SIGNIFICANT CHANGE UP (ref 11.7–16.1)
HGB BLD-MCNC: 13.4 G/DL — SIGNIFICANT CHANGE UP (ref 11.5–15.5)
HGB BLD-MCNC: 13.4 G/DL — SIGNIFICANT CHANGE UP (ref 11.5–15.5)
HGB BLD-MCNC: 13.7 G/DL — SIGNIFICANT CHANGE UP (ref 11.5–15.5)
HGB BLD-MCNC: 13.7 G/DL — SIGNIFICANT CHANGE UP (ref 11.5–15.5)
HGB BLD-MCNC: 15.8 G/DL
IMM GRANULOCYTES NFR BLD AUTO: 0.1 % — SIGNIFICANT CHANGE UP (ref 0–0.9)
IMM GRANULOCYTES NFR BLD AUTO: 0.1 % — SIGNIFICANT CHANGE UP (ref 0–0.9)
IMM GRANULOCYTES NFR BLD AUTO: 0.3 % — SIGNIFICANT CHANGE UP (ref 0–0.9)
IMM GRANULOCYTES NFR BLD AUTO: 0.3 % — SIGNIFICANT CHANGE UP (ref 0–0.9)
LACTATE BLDV-MCNC: 0.9 MMOL/L — SIGNIFICANT CHANGE UP (ref 0.5–2)
LACTATE BLDV-MCNC: 0.9 MMOL/L — SIGNIFICANT CHANGE UP (ref 0.5–2)
LYMPHOCYTES # BLD AUTO: 2.54 K/UL — SIGNIFICANT CHANGE UP (ref 1–3.3)
LYMPHOCYTES # BLD AUTO: 2.54 K/UL — SIGNIFICANT CHANGE UP (ref 1–3.3)
LYMPHOCYTES # BLD AUTO: 2.94 K/UL — SIGNIFICANT CHANGE UP (ref 1–3.3)
LYMPHOCYTES # BLD AUTO: 2.94 K/UL — SIGNIFICANT CHANGE UP (ref 1–3.3)
LYMPHOCYTES # BLD AUTO: 43.3 % — SIGNIFICANT CHANGE UP (ref 13–44)
LYMPHOCYTES # BLD AUTO: 43.3 % — SIGNIFICANT CHANGE UP (ref 13–44)
LYMPHOCYTES # BLD AUTO: 44.1 % — HIGH (ref 13–44)
LYMPHOCYTES # BLD AUTO: 44.1 % — HIGH (ref 13–44)
MCHC RBC-ENTMCNC: 30.6 PG
MCHC RBC-ENTMCNC: 30.8 PG — SIGNIFICANT CHANGE UP (ref 27–34)
MCHC RBC-ENTMCNC: 32.8 GM/DL
MCHC RBC-ENTMCNC: 33.7 GM/DL — SIGNIFICANT CHANGE UP (ref 32–36)
MCV RBC AUTO: 91.2 FL — SIGNIFICANT CHANGE UP (ref 80–100)
MCV RBC AUTO: 91.2 FL — SIGNIFICANT CHANGE UP (ref 80–100)
MCV RBC AUTO: 91.5 FL — SIGNIFICANT CHANGE UP (ref 80–100)
MCV RBC AUTO: 91.5 FL — SIGNIFICANT CHANGE UP (ref 80–100)
MCV RBC AUTO: 93.2 FL
MONOCYTES # BLD AUTO: 0.47 K/UL — SIGNIFICANT CHANGE UP (ref 0–0.9)
MONOCYTES # BLD AUTO: 0.47 K/UL — SIGNIFICANT CHANGE UP (ref 0–0.9)
MONOCYTES # BLD AUTO: 0.57 K/UL — SIGNIFICANT CHANGE UP (ref 0–0.9)
MONOCYTES # BLD AUTO: 0.57 K/UL — SIGNIFICANT CHANGE UP (ref 0–0.9)
MONOCYTES NFR BLD AUTO: 7 % — SIGNIFICANT CHANGE UP (ref 2–14)
MONOCYTES NFR BLD AUTO: 7 % — SIGNIFICANT CHANGE UP (ref 2–14)
MONOCYTES NFR BLD AUTO: 9.7 % — SIGNIFICANT CHANGE UP (ref 2–14)
MONOCYTES NFR BLD AUTO: 9.7 % — SIGNIFICANT CHANGE UP (ref 2–14)
MRSA PCR RESULT.: SIGNIFICANT CHANGE UP
MRSA PCR RESULT.: SIGNIFICANT CHANGE UP
NEUTROPHILS # BLD AUTO: 2.58 K/UL — SIGNIFICANT CHANGE UP (ref 1.8–7.4)
NEUTROPHILS # BLD AUTO: 2.58 K/UL — SIGNIFICANT CHANGE UP (ref 1.8–7.4)
NEUTROPHILS # BLD AUTO: 3.12 K/UL — SIGNIFICANT CHANGE UP (ref 1.8–7.4)
NEUTROPHILS # BLD AUTO: 3.12 K/UL — SIGNIFICANT CHANGE UP (ref 1.8–7.4)
NEUTROPHILS NFR BLD AUTO: 44.1 % — SIGNIFICANT CHANGE UP (ref 43–77)
NEUTROPHILS NFR BLD AUTO: 44.1 % — SIGNIFICANT CHANGE UP (ref 43–77)
NEUTROPHILS NFR BLD AUTO: 46.9 % — SIGNIFICANT CHANGE UP (ref 43–77)
NEUTROPHILS NFR BLD AUTO: 46.9 % — SIGNIFICANT CHANGE UP (ref 43–77)
PCO2 BLDV: 54 MMHG — HIGH (ref 39–42)
PCO2 BLDV: 54 MMHG — HIGH (ref 39–42)
PH BLDV: 7.35 — SIGNIFICANT CHANGE UP (ref 7.32–7.43)
PH BLDV: 7.35 — SIGNIFICANT CHANGE UP (ref 7.32–7.43)
PLATELET # BLD AUTO: 245 K/UL — SIGNIFICANT CHANGE UP (ref 150–400)
PLATELET # BLD AUTO: 245 K/UL — SIGNIFICANT CHANGE UP (ref 150–400)
PLATELET # BLD AUTO: 274 K/UL — SIGNIFICANT CHANGE UP (ref 150–400)
PLATELET # BLD AUTO: 274 K/UL — SIGNIFICANT CHANGE UP (ref 150–400)
PLATELET # BLD AUTO: 305 K/UL
PO2 BLDV: 54 MMHG — HIGH (ref 25–45)
PO2 BLDV: 54 MMHG — HIGH (ref 25–45)
POTASSIUM BLDV-SCNC: 3.9 MMOL/L — SIGNIFICANT CHANGE UP (ref 3.5–5.1)
POTASSIUM BLDV-SCNC: 3.9 MMOL/L — SIGNIFICANT CHANGE UP (ref 3.5–5.1)
POTASSIUM SERPL-MCNC: 3.9 MMOL/L — SIGNIFICANT CHANGE UP (ref 3.5–5.3)
POTASSIUM SERPL-MCNC: 3.9 MMOL/L — SIGNIFICANT CHANGE UP (ref 3.5–5.3)
POTASSIUM SERPL-MCNC: 4 MMOL/L — SIGNIFICANT CHANGE UP (ref 3.5–5.3)
POTASSIUM SERPL-MCNC: 4 MMOL/L — SIGNIFICANT CHANGE UP (ref 3.5–5.3)
POTASSIUM SERPL-SCNC: 3.9 MMOL/L — SIGNIFICANT CHANGE UP (ref 3.5–5.3)
POTASSIUM SERPL-SCNC: 3.9 MMOL/L — SIGNIFICANT CHANGE UP (ref 3.5–5.3)
POTASSIUM SERPL-SCNC: 4 MMOL/L — SIGNIFICANT CHANGE UP (ref 3.5–5.3)
POTASSIUM SERPL-SCNC: 4 MMOL/L — SIGNIFICANT CHANGE UP (ref 3.5–5.3)
POTASSIUM SERPL-SCNC: 4.4 MMOL/L
PROCALCITONIN SERPL-MCNC: <0.02 NG/ML
PROT SERPL-MCNC: 6.9 G/DL — SIGNIFICANT CHANGE UP (ref 6.6–8.7)
PROT SERPL-MCNC: 6.9 G/DL — SIGNIFICANT CHANGE UP (ref 6.6–8.7)
PROT SERPL-MCNC: 7.7 G/DL — SIGNIFICANT CHANGE UP (ref 6.6–8.7)
PROT SERPL-MCNC: 7.7 G/DL — SIGNIFICANT CHANGE UP (ref 6.6–8.7)
PROT SERPL-MCNC: 8.4 G/DL
RBC # BLD: 4.35 M/UL — SIGNIFICANT CHANGE UP (ref 3.8–5.2)
RBC # BLD: 4.35 M/UL — SIGNIFICANT CHANGE UP (ref 3.8–5.2)
RBC # BLD: 4.45 M/UL — SIGNIFICANT CHANGE UP (ref 3.8–5.2)
RBC # BLD: 4.45 M/UL — SIGNIFICANT CHANGE UP (ref 3.8–5.2)
RBC # BLD: 5.17 M/UL
RBC # FLD: 14.7 % — HIGH (ref 10.3–14.5)
RBC # FLD: 14.7 % — HIGH (ref 10.3–14.5)
RBC # FLD: 14.9 % — HIGH (ref 10.3–14.5)
RBC # FLD: 14.9 % — HIGH (ref 10.3–14.5)
RBC # FLD: 15.9 %
S AUREUS DNA NOSE QL NAA+PROBE: SIGNIFICANT CHANGE UP
S AUREUS DNA NOSE QL NAA+PROBE: SIGNIFICANT CHANGE UP
SAO2 % BLDV: 86.8 % — SIGNIFICANT CHANGE UP
SAO2 % BLDV: 86.8 % — SIGNIFICANT CHANGE UP
SODIUM SERPL-SCNC: 137 MMOL/L — SIGNIFICANT CHANGE UP (ref 135–145)
SODIUM SERPL-SCNC: 137 MMOL/L — SIGNIFICANT CHANGE UP (ref 135–145)
SODIUM SERPL-SCNC: 139 MMOL/L
SODIUM SERPL-SCNC: 140 MMOL/L — SIGNIFICANT CHANGE UP (ref 135–145)
SODIUM SERPL-SCNC: 140 MMOL/L — SIGNIFICANT CHANGE UP (ref 135–145)
WBC # BLD: 5.86 K/UL — SIGNIFICANT CHANGE UP (ref 3.8–10.5)
WBC # BLD: 5.86 K/UL — SIGNIFICANT CHANGE UP (ref 3.8–10.5)
WBC # BLD: 6.67 K/UL — SIGNIFICANT CHANGE UP (ref 3.8–10.5)
WBC # BLD: 6.67 K/UL — SIGNIFICANT CHANGE UP (ref 3.8–10.5)
WBC # FLD AUTO: 5.86 K/UL — SIGNIFICANT CHANGE UP (ref 3.8–10.5)
WBC # FLD AUTO: 5.86 K/UL — SIGNIFICANT CHANGE UP (ref 3.8–10.5)
WBC # FLD AUTO: 6.12 K/UL
WBC # FLD AUTO: 6.67 K/UL — SIGNIFICANT CHANGE UP (ref 3.8–10.5)
WBC # FLD AUTO: 6.67 K/UL — SIGNIFICANT CHANGE UP (ref 3.8–10.5)

## 2023-10-25 PROCEDURE — 99222 1ST HOSP IP/OBS MODERATE 55: CPT

## 2023-10-25 PROCEDURE — 99223 1ST HOSP IP/OBS HIGH 75: CPT

## 2023-10-25 PROCEDURE — 93010 ELECTROCARDIOGRAM REPORT: CPT

## 2023-10-25 PROCEDURE — 93306 TTE W/DOPPLER COMPLETE: CPT | Mod: 26

## 2023-10-25 RX ORDER — SERTRALINE 25 MG/1
0 TABLET, FILM COATED ORAL
Qty: 0 | Refills: 0 | DISCHARGE

## 2023-10-25 RX ORDER — IBUPROFEN 200 MG
0 TABLET ORAL
Qty: 0 | Refills: 0 | DISCHARGE

## 2023-10-25 RX ORDER — LANOLIN ALCOHOL/MO/W.PET/CERES
3 CREAM (GRAM) TOPICAL AT BEDTIME
Refills: 0 | Status: DISCONTINUED | OUTPATIENT
Start: 2023-10-25 | End: 2023-11-01

## 2023-10-25 RX ORDER — OXYCODONE HYDROCHLORIDE 5 MG/1
0 TABLET ORAL
Qty: 0 | Refills: 0 | DISCHARGE

## 2023-10-25 RX ORDER — ESOMEPRAZOLE MAGNESIUM 40 MG/1
1 CAPSULE, DELAYED RELEASE ORAL
Refills: 0 | DISCHARGE

## 2023-10-25 RX ORDER — ONDANSETRON 8 MG/1
4 TABLET, FILM COATED ORAL EVERY 8 HOURS
Refills: 0 | Status: DISCONTINUED | OUTPATIENT
Start: 2023-10-25 | End: 2023-11-01

## 2023-10-25 RX ORDER — MEROPENEM 1 G/30ML
INJECTION INTRAVENOUS
Refills: 0 | Status: DISCONTINUED | OUTPATIENT
Start: 2023-10-25 | End: 2023-10-26

## 2023-10-25 RX ORDER — MEROPENEM 1 G/30ML
1000 INJECTION INTRAVENOUS ONCE
Refills: 0 | Status: COMPLETED | OUTPATIENT
Start: 2023-10-25 | End: 2023-10-25

## 2023-10-25 RX ORDER — METOPROLOL TARTRATE 50 MG
100 TABLET ORAL DAILY
Refills: 0 | Status: DISCONTINUED | OUTPATIENT
Start: 2023-10-25 | End: 2023-11-01

## 2023-10-25 RX ORDER — HYDRALAZINE HCL 50 MG
0 TABLET ORAL
Qty: 0 | Refills: 0 | DISCHARGE

## 2023-10-25 RX ORDER — METOPROLOL TARTRATE 50 MG
1 TABLET ORAL
Refills: 0 | DISCHARGE

## 2023-10-25 RX ORDER — METOPROLOL TARTRATE 50 MG
0 TABLET ORAL
Qty: 0 | Refills: 0 | DISCHARGE

## 2023-10-25 RX ORDER — SERTRALINE 25 MG/1
100 TABLET, FILM COATED ORAL DAILY
Refills: 0 | Status: DISCONTINUED | OUTPATIENT
Start: 2023-10-25 | End: 2023-11-01

## 2023-10-25 RX ORDER — ENOXAPARIN SODIUM 100 MG/ML
40 INJECTION SUBCUTANEOUS EVERY 24 HOURS
Refills: 0 | Status: DISCONTINUED | OUTPATIENT
Start: 2023-10-25 | End: 2023-11-01

## 2023-10-25 RX ORDER — HYDRALAZINE HCL 50 MG
1 TABLET ORAL
Refills: 0 | DISCHARGE

## 2023-10-25 RX ORDER — ONDANSETRON 8 MG/1
1 TABLET, FILM COATED ORAL
Refills: 0 | DISCHARGE

## 2023-10-25 RX ORDER — MEROPENEM 1 G/30ML
1000 INJECTION INTRAVENOUS EVERY 8 HOURS
Refills: 0 | Status: DISCONTINUED | OUTPATIENT
Start: 2023-10-25 | End: 2023-10-26

## 2023-10-25 RX ORDER — OXYCODONE HYDROCHLORIDE 5 MG/1
15 TABLET ORAL ONCE
Refills: 0 | Status: DISCONTINUED | OUTPATIENT
Start: 2023-10-25 | End: 2023-10-25

## 2023-10-25 RX ORDER — PANTOPRAZOLE SODIUM 20 MG/1
40 TABLET, DELAYED RELEASE ORAL
Refills: 0 | Status: DISCONTINUED | OUTPATIENT
Start: 2023-10-25 | End: 2023-11-01

## 2023-10-25 RX ORDER — ACETAMINOPHEN 500 MG
650 TABLET ORAL EVERY 6 HOURS
Refills: 0 | Status: DISCONTINUED | OUTPATIENT
Start: 2023-10-25 | End: 2023-10-29

## 2023-10-25 RX ORDER — OXYCODONE HYDROCHLORIDE 5 MG/1
15 TABLET ORAL EVERY 4 HOURS
Refills: 0 | Status: DISCONTINUED | OUTPATIENT
Start: 2023-10-25 | End: 2023-10-29

## 2023-10-25 RX ORDER — OXYCODONE HYDROCHLORIDE 5 MG/1
15 TABLET ORAL EVERY 6 HOURS
Refills: 0 | Status: DISCONTINUED | OUTPATIENT
Start: 2023-10-25 | End: 2023-10-25

## 2023-10-25 RX ORDER — MORPHINE SULFATE 50 MG/1
2 CAPSULE, EXTENDED RELEASE ORAL EVERY 4 HOURS
Refills: 0 | Status: DISCONTINUED | OUTPATIENT
Start: 2023-10-25 | End: 2023-10-29

## 2023-10-25 RX ORDER — VANCOMYCIN HCL 1 G
1250 VIAL (EA) INTRAVENOUS EVERY 12 HOURS
Refills: 0 | Status: DISCONTINUED | OUTPATIENT
Start: 2023-10-25 | End: 2023-10-28

## 2023-10-25 RX ORDER — OMEPRAZOLE 10 MG/1
0 CAPSULE, DELAYED RELEASE ORAL
Qty: 0 | Refills: 0 | DISCHARGE

## 2023-10-25 RX ORDER — HYDRALAZINE HCL 50 MG
50 TABLET ORAL
Refills: 0 | Status: DISCONTINUED | OUTPATIENT
Start: 2023-10-25 | End: 2023-11-01

## 2023-10-25 RX ORDER — OXYCODONE HYDROCHLORIDE 5 MG/1
10 TABLET ORAL EVERY 4 HOURS
Refills: 0 | Status: DISCONTINUED | OUTPATIENT
Start: 2023-10-25 | End: 2023-10-25

## 2023-10-25 RX ADMIN — Medication 50 MILLIGRAM(S): at 21:41

## 2023-10-25 RX ADMIN — Medication 50 MILLIGRAM(S): at 08:18

## 2023-10-25 RX ADMIN — Medication 100 MILLIGRAM(S): at 08:18

## 2023-10-25 RX ADMIN — OXYCODONE HYDROCHLORIDE 15 MILLIGRAM(S): 5 TABLET ORAL at 16:40

## 2023-10-25 RX ADMIN — OXYCODONE HYDROCHLORIDE 15 MILLIGRAM(S): 5 TABLET ORAL at 17:10

## 2023-10-25 RX ADMIN — MEROPENEM 1000 MILLIGRAM(S): 1 INJECTION INTRAVENOUS at 05:28

## 2023-10-25 RX ADMIN — MEROPENEM 1000 MILLIGRAM(S): 1 INJECTION INTRAVENOUS at 21:40

## 2023-10-25 RX ADMIN — OXYCODONE HYDROCHLORIDE 10 MILLIGRAM(S): 5 TABLET ORAL at 05:24

## 2023-10-25 RX ADMIN — MEROPENEM 1000 MILLIGRAM(S): 1 INJECTION INTRAVENOUS at 13:05

## 2023-10-25 RX ADMIN — Medication 166.67 MILLIGRAM(S): at 13:05

## 2023-10-25 RX ADMIN — Medication 250 MILLIGRAM(S): at 01:47

## 2023-10-25 RX ADMIN — OXYCODONE HYDROCHLORIDE 15 MILLIGRAM(S): 5 TABLET ORAL at 10:28

## 2023-10-25 RX ADMIN — OXYCODONE HYDROCHLORIDE 15 MILLIGRAM(S): 5 TABLET ORAL at 01:55

## 2023-10-25 RX ADMIN — OXYCODONE HYDROCHLORIDE 15 MILLIGRAM(S): 5 TABLET ORAL at 09:37

## 2023-10-25 RX ADMIN — PANTOPRAZOLE SODIUM 40 MILLIGRAM(S): 20 TABLET, DELAYED RELEASE ORAL at 05:24

## 2023-10-25 RX ADMIN — Medication 30 MILLIGRAM(S): at 08:18

## 2023-10-25 RX ADMIN — MORPHINE SULFATE 2 MILLIGRAM(S): 50 CAPSULE, EXTENDED RELEASE ORAL at 13:49

## 2023-10-25 RX ADMIN — MORPHINE SULFATE 2 MILLIGRAM(S): 50 CAPSULE, EXTENDED RELEASE ORAL at 21:40

## 2023-10-25 RX ADMIN — SERTRALINE 100 MILLIGRAM(S): 25 TABLET, FILM COATED ORAL at 08:18

## 2023-10-25 RX ADMIN — MORPHINE SULFATE 2 MILLIGRAM(S): 50 CAPSULE, EXTENDED RELEASE ORAL at 21:55

## 2023-10-25 RX ADMIN — SODIUM CHLORIDE 1000 MILLILITER(S): 9 INJECTION INTRAMUSCULAR; INTRAVENOUS; SUBCUTANEOUS at 01:47

## 2023-10-25 RX ADMIN — MORPHINE SULFATE 2 MILLIGRAM(S): 50 CAPSULE, EXTENDED RELEASE ORAL at 13:05

## 2023-10-25 RX ADMIN — Medication 30 MILLIGRAM(S): at 21:47

## 2023-10-25 RX ADMIN — OXYCODONE HYDROCHLORIDE 10 MILLIGRAM(S): 5 TABLET ORAL at 06:24

## 2023-10-25 NOTE — CHART NOTE - NSCHARTNOTEFT_GEN_A_CORE
Patient seen and examined at bedside. Patient admitted overnight for GPC bacteremia and L foot wound. Currently on Merrem and Vancomycin. ID and Podiatry consults pending. Complaining of uncontrolled pain. Morphine PRN added.

## 2023-10-25 NOTE — H&P ADULT - HISTORY OF PRESENT ILLNESS
48F with PMHX Cauda Equina Syndrome, Chronic Back Pain, Hx DM2 (no longer on meds), HTN, MDD, Anxiety, Chronic Opiate Use, Tobacco Abuse sent to [facility] ER by outpatient ID Specialist after outpatient workup resulted with positive blood cultures. BCX +GPC Clusters x2 speciation pending. Repeat BCX x2 obtained. Patient given Vanco 1g IVx 1 in ED. Infected L Foot wound +purulent drainage for past few weeks likely source of bacteremia. She does not know when the wound occurred/became infected. Wears a brace/boot for the past month. XR pending. Pt seen/examined. Requesting chronic pain meds. No other complaints.    ROS negative unless mentioned.

## 2023-10-25 NOTE — H&P ADULT - ASSESSMENT
ASSESSMENT:  48F with PMHX Cauda Equina Syndrome, Chronic Back Pain, Hx DM2 (no longer on meds), HTN, MDD, Anxiety, Chronic Opiate Use, Tobacco Abuse sent to [facility] ER by outpatient ID Specialist after outpatient workup resulted with positive blood cultures admitted for GPC Bacteremia 2/2 Infected L Foot Wound.    PLAN:  GPC Bacteremia 2/2 Infected L Foot Wound  -Admit to Medicine  -Labs/Inflammatory markers reviewed  -XR Foot pending  -Outpatient BCX x2 +GPC Clusters speciation pending  -Repeat BCX x2 pending  -Check MRSA PCR  -Wound Cx L Foot drainage if able   -Vanco 1g q12   -Severe PCN Allergy (anaphylaxis) noted   -Add Meropenem 1g q8 for now  -Check TTE   -ID consulted  -Podiatry consulted    HTN  -Metoprolol Succinate 100mg q24  -Hydralazine 50mg BID    MDD, Anxiety  -Zoloft 100mg q24  -Buspar 30mg BID    Chronic LBP/Cauda Equina Syndrome  -Oxycodone IR 10mg q4 PRN    GERD  -Nexium 40mg q24    Hx DM2  -Reportedly no longer diabetic/taking meds s/p weight loss    VTE PPX   -SCD/LMWH 40 q24

## 2023-10-25 NOTE — H&P ADULT - TIME BILLING
Labs/Orders/Imaging reviewed. XRs viewed read pending. Outpatient ID notes reviewed. Med rec completed. Orders placed. ID consulted. Podiatry consulted.

## 2023-10-25 NOTE — H&P ADULT - NSVTERISKREFERASSESS_GEN_ALL_CORE
Order placed. Can you print and fax for me please? Refer to the Assessment tab to view/cancel completed assessment.

## 2023-10-25 NOTE — ED ADULT NURSE NOTE - OBJECTIVE STATEMENT
assumed pt care 0020 pt states has bacteremia as told by PCP when lab results abnormal, pt a&o x4 able to make needs known RR even unlabored c/o chronic pain in back pending pain medications denies other complaints at this time

## 2023-10-25 NOTE — H&P ADULT - NSHPPHYSICALEXAM_GEN_ALL_CORE
Vital Signs Last 24 Hrs  T(C): 37 (25 Oct 2023 04:12), Max: 37 (25 Oct 2023 04:12)  T(F): 98.6 (25 Oct 2023 04:12), Max: 98.6 (25 Oct 2023 04:12)  HR: 70 (25 Oct 2023 04:12) (70 - 104)  BP: 138/68 (25 Oct 2023 04:12) (125/79 - 149/81)  BP(mean): --  RR: 18 (25 Oct 2023 04:12) (18 - 20)  SpO2: 95% (25 Oct 2023 04:12) (95% - 99%)    Parameters below as of 25 Oct 2023 04:12  Patient On (Oxygen Delivery Method): room air    Constitutional: Well-appearing, NAD, VSS  Head: NC/AT  Eyes: PERRL, EOMI, anicteric sclera, conjunctiva WNL  ENT: Normal Pharynx, MMM, No tonsillar exudate/erythema  Neck: Supple, Non-tender  Chest: Non-tender, no rashes  Cardio: RRR, s1/s2, no appreciable murmurs/rubs/gallops  Resp: BS CTA bilaterally, no wheezing/rhonchi/rales  Abd: Soft, Non-tender, Non-distended, no rebound/guarding/rigidity  : not examined  Rectal: not examined  MSK: moving all extremities, no motor weakness, full ROM x4  Ext: palpable distal pulses, good capillary refill +L Foot Wound +drainage with boot in place  Psych: appropriate, cooperative  Neuro: CN II-XII grossly intact, no focal deficits  Skin: Warm/Dry. No rashes.

## 2023-10-25 NOTE — ED ADULT NURSE NOTE - NSFALLRISKINTERV_ED_ALL_ED

## 2023-10-25 NOTE — ED ADULT NURSE NOTE - ED STAT RN HANDOFF DETAILS
HR given to Titi RN pt stable VSS on RA RN updated on POC denies complaints at this time fall and safety precautions in place

## 2023-10-26 LAB
ALBUMIN SERPL ELPH-MCNC: 3.6 G/DL — SIGNIFICANT CHANGE UP (ref 3.3–5.2)
ALBUMIN SERPL ELPH-MCNC: 3.6 G/DL — SIGNIFICANT CHANGE UP (ref 3.3–5.2)
ALP SERPL-CCNC: 61 U/L — SIGNIFICANT CHANGE UP (ref 40–120)
ALP SERPL-CCNC: 61 U/L — SIGNIFICANT CHANGE UP (ref 40–120)
ALT FLD-CCNC: <5 U/L — SIGNIFICANT CHANGE UP
ALT FLD-CCNC: <5 U/L — SIGNIFICANT CHANGE UP
ANION GAP SERPL CALC-SCNC: 11 MMOL/L — SIGNIFICANT CHANGE UP (ref 5–17)
ANION GAP SERPL CALC-SCNC: 11 MMOL/L — SIGNIFICANT CHANGE UP (ref 5–17)
AST SERPL-CCNC: 12 U/L — SIGNIFICANT CHANGE UP
AST SERPL-CCNC: 12 U/L — SIGNIFICANT CHANGE UP
BILIRUB SERPL-MCNC: 0.4 MG/DL — SIGNIFICANT CHANGE UP (ref 0.4–2)
BILIRUB SERPL-MCNC: 0.4 MG/DL — SIGNIFICANT CHANGE UP (ref 0.4–2)
BUN SERPL-MCNC: 4.9 MG/DL — LOW (ref 8–20)
BUN SERPL-MCNC: 4.9 MG/DL — LOW (ref 8–20)
CALCIUM SERPL-MCNC: 8.6 MG/DL — SIGNIFICANT CHANGE UP (ref 8.4–10.5)
CALCIUM SERPL-MCNC: 8.6 MG/DL — SIGNIFICANT CHANGE UP (ref 8.4–10.5)
CHLORIDE SERPL-SCNC: 104 MMOL/L — SIGNIFICANT CHANGE UP (ref 96–108)
CHLORIDE SERPL-SCNC: 104 MMOL/L — SIGNIFICANT CHANGE UP (ref 96–108)
CO2 SERPL-SCNC: 27 MMOL/L — SIGNIFICANT CHANGE UP (ref 22–29)
CO2 SERPL-SCNC: 27 MMOL/L — SIGNIFICANT CHANGE UP (ref 22–29)
CREAT SERPL-MCNC: 0.71 MG/DL — SIGNIFICANT CHANGE UP (ref 0.5–1.3)
CREAT SERPL-MCNC: 0.71 MG/DL — SIGNIFICANT CHANGE UP (ref 0.5–1.3)
CRP SERPL-MCNC: 18 MG/L — HIGH
CRP SERPL-MCNC: 18 MG/L — HIGH
EGFR: 106 ML/MIN/1.73M2 — SIGNIFICANT CHANGE UP
EGFR: 106 ML/MIN/1.73M2 — SIGNIFICANT CHANGE UP
ERYTHROCYTE [SEDIMENTATION RATE] IN BLOOD: 45 MM/HR — HIGH (ref 0–20)
ERYTHROCYTE [SEDIMENTATION RATE] IN BLOOD: 45 MM/HR — HIGH (ref 0–20)
GLUCOSE SERPL-MCNC: 87 MG/DL — SIGNIFICANT CHANGE UP (ref 70–99)
GLUCOSE SERPL-MCNC: 87 MG/DL — SIGNIFICANT CHANGE UP (ref 70–99)
HCT VFR BLD CALC: 39.7 % — SIGNIFICANT CHANGE UP (ref 34.5–45)
HCT VFR BLD CALC: 39.7 % — SIGNIFICANT CHANGE UP (ref 34.5–45)
HGB BLD-MCNC: 13.1 G/DL — SIGNIFICANT CHANGE UP (ref 11.5–15.5)
HGB BLD-MCNC: 13.1 G/DL — SIGNIFICANT CHANGE UP (ref 11.5–15.5)
MCHC RBC-ENTMCNC: 30.8 PG — SIGNIFICANT CHANGE UP (ref 27–34)
MCHC RBC-ENTMCNC: 30.8 PG — SIGNIFICANT CHANGE UP (ref 27–34)
MCHC RBC-ENTMCNC: 33 GM/DL — SIGNIFICANT CHANGE UP (ref 32–36)
MCHC RBC-ENTMCNC: 33 GM/DL — SIGNIFICANT CHANGE UP (ref 32–36)
MCV RBC AUTO: 93.2 FL — SIGNIFICANT CHANGE UP (ref 80–100)
MCV RBC AUTO: 93.2 FL — SIGNIFICANT CHANGE UP (ref 80–100)
PLATELET # BLD AUTO: 237 K/UL — SIGNIFICANT CHANGE UP (ref 150–400)
PLATELET # BLD AUTO: 237 K/UL — SIGNIFICANT CHANGE UP (ref 150–400)
POTASSIUM SERPL-MCNC: 4.2 MMOL/L — SIGNIFICANT CHANGE UP (ref 3.5–5.3)
POTASSIUM SERPL-MCNC: 4.2 MMOL/L — SIGNIFICANT CHANGE UP (ref 3.5–5.3)
POTASSIUM SERPL-SCNC: 4.2 MMOL/L — SIGNIFICANT CHANGE UP (ref 3.5–5.3)
POTASSIUM SERPL-SCNC: 4.2 MMOL/L — SIGNIFICANT CHANGE UP (ref 3.5–5.3)
PROT SERPL-MCNC: 6.6 G/DL — SIGNIFICANT CHANGE UP (ref 6.6–8.7)
PROT SERPL-MCNC: 6.6 G/DL — SIGNIFICANT CHANGE UP (ref 6.6–8.7)
RBC # BLD: 4.26 M/UL — SIGNIFICANT CHANGE UP (ref 3.8–5.2)
RBC # BLD: 4.26 M/UL — SIGNIFICANT CHANGE UP (ref 3.8–5.2)
RBC # FLD: 14.8 % — HIGH (ref 10.3–14.5)
RBC # FLD: 14.8 % — HIGH (ref 10.3–14.5)
SODIUM SERPL-SCNC: 142 MMOL/L — SIGNIFICANT CHANGE UP (ref 135–145)
SODIUM SERPL-SCNC: 142 MMOL/L — SIGNIFICANT CHANGE UP (ref 135–145)
VANCOMYCIN TROUGH SERPL-MCNC: 16.1 UG/ML — SIGNIFICANT CHANGE UP (ref 10–20)
VANCOMYCIN TROUGH SERPL-MCNC: 16.1 UG/ML — SIGNIFICANT CHANGE UP (ref 10–20)
WBC # BLD: 5.38 K/UL — SIGNIFICANT CHANGE UP (ref 3.8–10.5)
WBC # BLD: 5.38 K/UL — SIGNIFICANT CHANGE UP (ref 3.8–10.5)
WBC # FLD AUTO: 5.38 K/UL — SIGNIFICANT CHANGE UP (ref 3.8–10.5)
WBC # FLD AUTO: 5.38 K/UL — SIGNIFICANT CHANGE UP (ref 3.8–10.5)

## 2023-10-26 PROCEDURE — 99233 SBSQ HOSP IP/OBS HIGH 50: CPT | Mod: GC

## 2023-10-26 PROCEDURE — 99232 SBSQ HOSP IP/OBS MODERATE 35: CPT

## 2023-10-26 PROCEDURE — 93923 UPR/LXTR ART STDY 3+ LVLS: CPT | Mod: 26

## 2023-10-26 PROCEDURE — 72132 CT LUMBAR SPINE W/DYE: CPT | Mod: 26

## 2023-10-26 RX ADMIN — OXYCODONE HYDROCHLORIDE 15 MILLIGRAM(S): 5 TABLET ORAL at 13:30

## 2023-10-26 RX ADMIN — SERTRALINE 100 MILLIGRAM(S): 25 TABLET, FILM COATED ORAL at 13:17

## 2023-10-26 RX ADMIN — Medication 166.67 MILLIGRAM(S): at 13:16

## 2023-10-26 RX ADMIN — OXYCODONE HYDROCHLORIDE 15 MILLIGRAM(S): 5 TABLET ORAL at 22:34

## 2023-10-26 RX ADMIN — Medication 30 MILLIGRAM(S): at 06:25

## 2023-10-26 RX ADMIN — MEROPENEM 1000 MILLIGRAM(S): 1 INJECTION INTRAVENOUS at 06:25

## 2023-10-26 RX ADMIN — OXYCODONE HYDROCHLORIDE 15 MILLIGRAM(S): 5 TABLET ORAL at 07:49

## 2023-10-26 RX ADMIN — OXYCODONE HYDROCHLORIDE 15 MILLIGRAM(S): 5 TABLET ORAL at 01:39

## 2023-10-26 RX ADMIN — Medication 50 MILLIGRAM(S): at 06:25

## 2023-10-26 RX ADMIN — Medication 100 MILLIGRAM(S): at 06:26

## 2023-10-26 RX ADMIN — PANTOPRAZOLE SODIUM 40 MILLIGRAM(S): 20 TABLET, DELAYED RELEASE ORAL at 06:26

## 2023-10-26 RX ADMIN — Medication 50 MILLIGRAM(S): at 17:14

## 2023-10-26 RX ADMIN — OXYCODONE HYDROCHLORIDE 15 MILLIGRAM(S): 5 TABLET ORAL at 12:27

## 2023-10-26 RX ADMIN — OXYCODONE HYDROCHLORIDE 15 MILLIGRAM(S): 5 TABLET ORAL at 00:39

## 2023-10-26 RX ADMIN — Medication 166.67 MILLIGRAM(S): at 00:18

## 2023-10-26 RX ADMIN — OXYCODONE HYDROCHLORIDE 15 MILLIGRAM(S): 5 TABLET ORAL at 23:34

## 2023-10-26 RX ADMIN — OXYCODONE HYDROCHLORIDE 15 MILLIGRAM(S): 5 TABLET ORAL at 06:27

## 2023-10-26 RX ADMIN — Medication 30 MILLIGRAM(S): at 17:14

## 2023-10-26 RX ADMIN — ENOXAPARIN SODIUM 40 MILLIGRAM(S): 100 INJECTION SUBCUTANEOUS at 06:24

## 2023-10-26 RX ADMIN — OXYCODONE HYDROCHLORIDE 15 MILLIGRAM(S): 5 TABLET ORAL at 18:34

## 2023-10-26 RX ADMIN — OXYCODONE HYDROCHLORIDE 15 MILLIGRAM(S): 5 TABLET ORAL at 19:10

## 2023-10-26 NOTE — CONSULT NOTE ADULT - ASSESSMENT
48-year-old female with a complicated medical h/o L4/5 decompression done on January 13, 2019 at [facility]. At that time she had cauda equina syndrome and was admitted in February 2019 for wound infection from that surgery. She had MSSA growing from the incision site and had a I&D done on February 22, 2019. She was discharged on IV cefazolin which she completed in April 4, 2019. She followed up with us after that time. Stopped following up after April 21 2020. She recently was seen at [facility] emergency room for skin peeling. She was discharged from the emergency room after her labs and work-up was within normal limits. Patient is concerned for any infection since she had a severe infection in 2019 and is traumatized from that, so was seen in the office on 5/16/23 and had a negative work up. Patient was seen in the office on 10/23 with c/o chills and drainage from the left foot. Blood cultures and blood work was obtained in the office. Blood cultures with GPC. Patient was referred to the ER. In the Hospital patient has been afebrile, no leukocytosis. Started on Vancomycin and meropenem.       Positive blood cultures  Chills  Left foot drainage from wound    - Blood cultures in the office on 10/23 with CoNS  - Repeat blood cultures pending  - XR Foot with no acute findings   - Check CT or MRI foot  - Podiatry eval   - Procalcitonin level in office < 0.03  - CRP 5  - ESR 40  - Continue Vancomycin  - Monitor trough, will order next trough  - Monitor for Vancomycin toxicity   - Vancomycin required at this time pending culture results  - Monitor Cr while on Vancomycin, will order Cr for AM  - Continue Meropenem   - Follow up cultures  - Trend Fever  - Trend WBC      Thank you for allowing me to participate in the care of your patient.   Will Follow    Discussed treatment plan with: Dr Ospina   
48F with PMHX Cauda Equina Syndrome, Chronic Back Pain, Hx DM2 (no longer on meds), HTN, MDD, Anxiety, Chronic Opiate Use, Tobacco Abuse sent to [facility] ER by outpatient ID Specialist after outpatient workup resulted with positive blood cultures  chronic pain on oxy 15mg PRN    Med Recs:  oxyCODONE    IR 15 milliGRAM(s) Oral every 4 hours PRN  lidocaine   Patch 12 hours on, 12 hours off. Max 3 patches on at one time

## 2023-10-26 NOTE — CONSULT NOTE ADULT - REASON FOR ADMISSION
GPC Bacteremia 2/2 infected L foot wound

## 2023-10-26 NOTE — CONSULT NOTE ADULT - SUBJECTIVE AND OBJECTIVE BOX
[facility]  INFECTIOUS DISEASES at [place] / [place] / [place]  =======================================================                               Perry Carreno MD#   Zuleyka Mccabe MD*                             Lizzy Acosta MD*   Barbara Luna MD*                              Professor Emeritus:  Dr Bebeto Mccall MD^            Diplomates American Board of Internal Medicine & Infectious Diseases                # [place] Office - Appt - Tel  [phone] Fax [phone]                * [place] Office - Appt - Tel [phone] Fax [phone]               ^ [place] Office - Appt - Tel  [phone] Fax [phone]                                  Hospital Consult line:  [phone]  =======================================================      [id number]  CLARENCE VITALE    CC: Patient is a 48y old  Female who presents with a chief complaint of GPC Bacteremia 2/2 infected L foot wound (25 Oct 2023 04:23)      48-year-old female with a complicated medical h/o L4/5 decompression done on January 13, 2019 at [facility]. At that time she had cauda equina syndrome and was admitted in February 2019 for wound infection from that surgery. She had MSSA growing from the incision site and had a I&D done on February 22, 2019. She was discharged on IV cefazolin which she completed in April 4, 2019. She followed up with us after that time. Stopped following up after April 21 2020. She recently was seen at [facility] emergency room for skin peeling. She was discharged from the emergency room after her labs and work-up was within normal limits. Patient is concerned for any infection since she had a severe infection in 2019 and is traumatized from that, so was seen in the office on 5/16/23 and had a negative work up. Patient was seen in the office on 10/23 with c/o chills and drainage from the left foot. Blood cultures and blood work was obtained in the office. Blood cultures with GPC. Patient was referred to the ER. In the Hospital patient has been afebrile, no leukocytosis. Started on Vancomycin and meropenem. ID input requested.       Past Medical & Surgical Hx:  Dental infection  Hypertension, unspecified type  Diabetes mellitus  Sciatica  Chronic low back pain, unspecified back pain laterality, unspecified whether sciatica present  L4/5 decompression done on January 13, 2019      History of cholecystectomy  H/O Spinal surgery      Social Hx:  + Smoker       FAMILY HISTORY:  No pertinent family history in first degree relatives      Allergies  penicillin (Unknown)  Milk (Unknown)  amoxicillin (Anaphylaxis)  citrus (Unknown)       REVIEW OF SYSTEMS:  CONSTITUTIONAL:  No Fever. Occasional chills  HEENT:  No diplopia or blurred vision.  No earache, sore throat or runny nose.  CARDIOVASCULAR:  No chest pain  RESPIRATORY:  No cough, shortness of breath  GASTROINTESTINAL:  No nausea, vomiting or diarrhea.  GENITOURINARY:  No dysuria, frequency or urgency. No Blood in urine  MUSCULOSKELETAL:  no joint aches, no muscle pain  SKIN:  No change in skin, hair or nails.  NEUROLOGIC:  No Headaches      Physical Exam:  GEN: NAD  HEENT: normocephalic and atraumatic. EOMI. PERRL.    NECK: Supple.   LUNGS: CTA B/L.  HEART: RRR  ABDOMEN: Soft, NT, ND.  +BS.    : No CVA tenderness  EXTREMITIES: Without  edema.  MSK: No joint swelling  NEUROLOGIC: No Focal Deficits   PSYCHIATRIC: Appropriate affect .  SKIN: No rash      Height (cm): 160 (10-24 @ 19:26)  Weight (kg): 87.3 (10-24 @ 19:26)  BMI (kg/m2): 34.1 (10-24 @ 19:26)  BSA (m2): 1.9 (10-24 @ 19:26)      Vitals:  T(F): 98 (25 Oct 2023 08:54), Max: 98.6 (25 Oct 2023 04:12)  HR: 66 (25 Oct 2023 08:54)  BP: 113/65 (25 Oct 2023 08:54)  RR: 18 (25 Oct 2023 08:54)  SpO2: 99% (25 Oct 2023 08:54) (95% - 99%)  temp max in last 48H T(F): , Max: 98.6 (10-25-23 @ 04:12)    Current Antibiotics:  meropenem Injectable 1000 milliGRAM(s) IV Push every 8 hours  meropenem Injectable      vancomycin  IVPB 1250 milliGRAM(s) IV Intermittent every 12 hours    Other medications:  busPIRone 30 milliGRAM(s) Oral two times a day  enoxaparin Injectable 40 milliGRAM(s) SubCutaneous every 24 hours  hydrALAZINE 50 milliGRAM(s) Oral two times a day  metoprolol succinate  milliGRAM(s) Oral daily  pantoprazole    Tablet 40 milliGRAM(s) Oral before breakfast  sertraline 100 milliGRAM(s) Oral daily                            13.4   5.86  )-----------( 245      ( 25 Oct 2023 10:28 )             39.8     10-25    140  |  102  |  5.9<L>  ----------------------------<  137<H>  4.0   |  27.0  |  0.73    Ca    8.7      25 Oct 2023 10:28    TPro  6.9  /  Alb  3.8  /  TBili  0.3<L>  /  DBili  x   /  AST  12  /  ALT  5   /  AlkPhos  62  10-25    RECENT CULTURES:      WBC Count: 5.86 K/uL (10-25-23 @ 10:28)  WBC Count: 6.67 K/uL (10-25-23 @ 00:20)    Creatinine: 0.73 mg/dL (10-25-23 @ 10:28)  Creatinine: 0.82 mg/dL (10-25-23 @ 00:20)    C-Reactive Protein, Serum: 5 mg/L (10-25-23 @ 00:20)    Sedimentation Rate, Erythrocyte: 40 mm/hr (10-25-23 @ 00:20)         < from: Xray Foot AP + Lateral, Left (10.24.23 @ 23:43) >  ACC: [id number] EXAM:  XR FOOT 2 VIEWS LT   ORDERED BY: MISTY OHARA     PROCEDURE DATE:  10/24/2023          INTERPRETATION:  XR FOOT 2 VIEWS LEFT    Clinical History: Heel wound    AP, lateral and oblique view of the left foot    The patient appears to be wearing a sock.      FINDINGS:    There is no fracture, dislocation, soft tissue swelling or joint effusion.  Mild hallux valgus.  No radiopaque foreign body.    IMPRESSION:  1.  No plain film evidence of osteomyelitis or subcutaneous emphysema.    --- End of Report ---    < end of copied text >      
Chief Complaint : Patient is a 48y old  Female who presents with a chief complaint of GPC Bacteremia 2/2 infected L foot wound (25 Oct 2023 12:19)    Podiatry HPI: 48y year old Female seen at bedside for Left foot ulceration.  Patient relates to having the ulceration for couple of months but unsure in regards to exact timeline. States that the area drains time to time and then heals up. States that she saw a podiatrist named Dr. Li who gave her the Orthosis for her left lower extremity which is paralyzed.  Denies seeing anyone for the wound. Denies any other pedal complaints. No other complaints at this time.     HPI : HPI:  48F with PMHX Cauda Equina Syndrome, Chronic Back Pain, Hx DM2 (no longer on meds), HTN, MDD, Anxiety, Chronic Opiate Use, Tobacco Abuse sent to [facility] ER by outpatient ID Specialist after outpatient workup resulted with positive blood cultures. BCX +GPC Clusters x2 speciation pending. Repeat BCX x2 obtained. Patient given Vanco 1g IVx 1 in ED. Infected L Foot wound +purulent drainage for past few weeks likely source of bacteremia. She does not know when the wound occurred/became infected. Wears a brace/boot for the past month. XR pending. Pt seen/examined. Requesting chronic pain meds. No other complaints.    ROS negative unless mentioned. (25 Oct 2023 04:23)    PMH: Dental infection    Hypertension, unspecified type    Diabetes mellitus    Sciatica    Chronic low back pain, unspecified back pain laterality, unspecified whether sciatica present      PSH:History of cholecystectomy    H/O Spinal surgery        Allergies:penicillin (Unknown)  Milk (Unknown)  amoxicillin (Anaphylaxis)  citrus (Unknown)      Labs:                          13.4   5.86  )-----------( 245      ( 25 Oct 2023 10:28 )             39.8     WBC Trend  5.86 Date (10-25 @ 10:28)  6.67 Date (10-25 @ 00:20)      Chem  10-25    140  |  102  |  5.9<L>  ----------------------------<  137<H>  4.0   |  27.0  |  0.73    Ca    8.7      25 Oct 2023 10:28    TPro  6.9  /  Alb  3.8  /  TBili  0.3<L>  /  DBili  x   /  AST  12  /  ALT  5   /  AlkPhos  62  10-25          T(F): 98 (10-25-23 @ 08:54), Max: 98.6 (10-25-23 @ 04:12)  HR: 66 (10-25-23 @ 08:54) (66 - 104)  BP: 113/65 (10-25-23 @ 08:54) (113/65 - 149/81)  RR: 18 (10-25-23 @ 08:54) (18 - 20)  SpO2: 99% (10-25-23 @ 08:54) (95% - 99%)  Wt(kg): --    Physical Exam:     Integument:  Skin warm, dry and supple bilateral.    Ulceration Left Plantar sub met 3-5 ulcer with, no hyperkeratotic border, macerated wound base , wound size (1.5cm X 1.5cm), - edema, - jeanne-wound erythema, - purulence, - fluctuance, - tracking/tunneling, - probe to bone, - malodor.   Vascular: Dorsalis Pedis and Posterior Tibial pulses 2/4.  Capillary re-fill time less then 3 seconds digits 1-5 bilateral.    Neuro: Protective sensation diminished to the left lower extremity  MSK: left lower extremity paralysis with no muscle strength.     A: Left foot ulceration      P:   Chart reviewed and Patient evaluated  Discussed diagnosis and treatment with patient  Wound flush with normal saline  Applied xeroform with dry sterile dressing  X-rays reviewed  Continue with IV antibiotics   Recc ID consult  Ordered GERMAN  Offloading to bilateral Heels.   Discussed importance of daily foot examinations and proper shoe gear and to importance of lower Fasting Blood Glucose levels.   Podiatry will follow while in house.  Discussed with Attending Dr. Kim
Patient is a 48y old  Female who presents with a chief complaint of GPC Bacteremia 2/2 infected L foot wound (25 Oct 2023 14:01)      HPI:  48F with PMHX Cauda Equina Syndrome, Chronic Back Pain, Hx DM2 (no longer on meds), HTN, MDD, Anxiety, Chronic Opiate Use, Tobacco Abuse sent to [facility] ER by outpatient ID Specialist after outpatient workup resulted with positive blood cultures. BCX +GPC Clusters x2 speciation pending. Repeat BCX x2 obtained. Patient given Vanco 1g IVx 1 in ED. Infected L Foot wound +purulent drainage for past few weeks likely source of bacteremia. She does not know when the wound occurred/became infected. Wears a brace/boot for the past month. XR pending. Pt seen/examined. Requesting chronic pain meds. No other complaints.    ROS negative unless mentioned. (25 Oct 2023 04:23)            Analgesic Dosing for past 24 hours reviewed as below:    busPIRone   30 milliGRAM(s) Oral (10-26-23 @ 06:25)   30 milliGRAM(s) Oral (10-25-23 @ 21:47)    morphine  - Injectable   2 milliGRAM(s) IV Push (10-25-23 @ 21:40)   2 milliGRAM(s) IV Push (10-25-23 @ 13:05)    oxyCODONE    IR   15 milliGRAM(s) Oral (10-26-23 @ 12:27)   15 milliGRAM(s) Oral (10-26-23 @ 06:27)   15 milliGRAM(s) Oral (10-26-23 @ 00:39)   15 milliGRAM(s) Oral (10-25-23 @ 16:40)          T(C): 36.4 (10-26-23 @ 05:06), Max: 37 (10-25-23 @ 21:13)  HR: 65 (10-26-23 @ 05:06) (65 - 72)  BP: 114/70 (10-26-23 @ 05:06) (114/70 - 160/83)  RR: 18 (10-26-23 @ 05:06) (18 - 18)  SpO2: 98% (10-26-23 @ 05:06) (96% - 98%)        acetaminophen     Tablet .. 650 milliGRAM(s) Oral every 6 hours PRN  aluminum hydroxide/magnesium hydroxide/simethicone Suspension 30 milliLiter(s) Oral every 4 hours PRN  busPIRone 30 milliGRAM(s) Oral two times a day  enoxaparin Injectable 40 milliGRAM(s) SubCutaneous every 24 hours  hydrALAZINE 50 milliGRAM(s) Oral two times a day  melatonin 3 milliGRAM(s) Oral at bedtime PRN  meropenem Injectable 1000 milliGRAM(s) IV Push every 8 hours  meropenem Injectable      metoprolol succinate  milliGRAM(s) Oral daily  morphine  - Injectable 2 milliGRAM(s) IV Push every 4 hours PRN  ondansetron Injectable 4 milliGRAM(s) IV Push every 8 hours PRN  oxyCODONE    IR 15 milliGRAM(s) Oral every 4 hours PRN  pantoprazole    Tablet 40 milliGRAM(s) Oral before breakfast  sertraline 100 milliGRAM(s) Oral daily  vancomycin  IVPB 1250 milliGRAM(s) IV Intermittent every 12 hours                          13.1   5.38  )-----------( 237      ( 26 Oct 2023 06:16 )             39.7     10-26    142  |  104  |  4.9<L>  ----------------------------<  87  4.2   |  27.0  |  0.71    Ca    8.6      26 Oct 2023 06:16    TPro  6.6  /  Alb  3.6  /  TBili  0.4  /  DBili  x   /  AST  12  /  ALT  <5  /  AlkPhos  61  10-26      Urinalysis Basic - ( 26 Oct 2023 06:16 )    Color: x / Appearance: x / SG: x / pH: x  Gluc: 87 mg/dL / Ketone: x  / Bili: x / Urobili: x   Blood: x / Protein: x / Nitrite: x   Leuk Esterase: x / RBC: x / WBC x   Sq Epi: x / Non Sq Epi: x / Bacteria: x        Pain Service   [phone]

## 2023-10-26 NOTE — PROGRESS NOTE ADULT - SUBJECTIVE AND OBJECTIVE BOX
Patient is a 48y old  Female who presents with a chief complaint of GPC Bacteremia 2/2 infected L foot wound (25 Oct 2023 14:01)      BRIEF HOSPITAL COURSE:  48F with significant pmh of  Cauda Equina Syndrome, Chronic Back Pain, Hx DM2 (no longer on meds), HTN, MDD, Anxiety, Chronic Opiate Use, Tobacco Abuse admitted for positive blood cultures. BCX +GPC Clusters x2 speciation pending. Repeat BCX x2 obtained. Patient given Vanco 1g IVx 1 in ED. Infected L Foot wound +purulent drainage for past few weeks likely source of bacteremia. Pending MR foot and CT Lumbar spine with contrast.    INTERVAL HPI/OVERNIGHT EVENTS:  No acute overnight events.    TODAY: Patient is doing well except for pain in her back, leg. She is asking for her home dose of oxy medication and we explained that the order is in and that since it's PRN, she will have to ask for it. She is seen walking around the unit making laps with a walker    MEDICATIONS  (STANDING):  busPIRone 30 milliGRAM(s) Oral two times a day  enoxaparin Injectable 40 milliGRAM(s) SubCutaneous every 24 hours  hydrALAZINE 50 milliGRAM(s) Oral two times a day  meropenem Injectable 1000 milliGRAM(s) IV Push every 8 hours  meropenem Injectable      metoprolol succinate  milliGRAM(s) Oral daily  pantoprazole    Tablet 40 milliGRAM(s) Oral before breakfast  sertraline 100 milliGRAM(s) Oral daily  vancomycin  IVPB 1250 milliGRAM(s) IV Intermittent every 12 hours    MEDICATIONS  (PRN):  acetaminophen     Tablet .. 650 milliGRAM(s) Oral every 6 hours PRN Temp greater or equal to 38C (100.4F), Mild Pain (1 - 3)  aluminum hydroxide/magnesium hydroxide/simethicone Suspension 30 milliLiter(s) Oral every 4 hours PRN Dyspepsia  melatonin 3 milliGRAM(s) Oral at bedtime PRN Insomnia  morphine  - Injectable 2 milliGRAM(s) IV Push every 4 hours PRN Severe Pain (7 - 10)  ondansetron Injectable 4 milliGRAM(s) IV Push every 8 hours PRN Nausea and/or Vomiting  oxyCODONE    IR 15 milliGRAM(s) Oral every 4 hours PRN Severe Pain (7 - 10)      Allergies    penicillin (Unknown)  Milk (Unknown)  amoxicillin (Anaphylaxis)  citrus (Unknown)    Intolerances    REVIEW OF SYSTEMS:  CONSTITUTIONAL: No fever, weight loss, or fatigue  RESPIRATORY: No cough, wheezing, chills or hemoptysis; No shortness of breath  CARDIOVASCULAR: No chest pain, palpitations, dizziness, or leg swelling  GASTROINTESTINAL: No abdominal or epigastric pain. No nausea, vomiting  NEUROLOGICAL: No headaches, memory loss, loss of strength, numbness, or tremors, + left leg weakness  MUSCULOSKELETAL: No joint pain or swelling; + back pain    Vital Signs Last 24 Hrs  T(C): 36.4 (26 Oct 2023 05:06), Max: 37 (25 Oct 2023 21:13)  T(F): 97.5 (26 Oct 2023 05:06), Max: 98.6 (25 Oct 2023 21:13)  HR: 65 (26 Oct 2023 05:06) (65 - 72)  BP: 114/70 (26 Oct 2023 05:06) (114/70 - 160/83)  BP(mean): --  RR: 18 (26 Oct 2023 05:06) (18 - 18)  SpO2: 98% (26 Oct 2023 05:06) (96% - 98%)    Parameters below as of 26 Oct 2023 05:06  Patient On (Oxygen Delivery Method): room air    PHYSICAL EXAM:  GENERAL:  A&Ox3, No acute distress, lying comfortably in bed  HEAD:  Atraumatic, Normocephalic  EYES: EOMI, PERRLA, conjunctiva and sclera clear  NERVOUS SYSTEM:  Alert & Oriented, No gross focal deficits  CHEST/LUNG: Clear to auscultation bilaterally; No rales, rhonchi, wheezing, or rubs  HEART: Regular rate and rhythm; No murmurs, rubs, or gallops  ABDOMEN: Soft, Nontender, Nondistended; Bowel sounds present  EXTREMITIES:  left foot is wrapped   SKIN: Lumbar scar noted    LABS:                        13.1   5.38  )-----------( 237      ( 26 Oct 2023 06:16 )             39.7     10-26    142  |  104  |  4.9<L>  ----------------------------<  87  4.2   |  27.0  |  0.71    Ca    8.6      26 Oct 2023 06:16    TPro  6.6  /  Alb  3.6  /  TBili  0.4  /  DBili  x   /  AST  12  /  ALT  <5  /  AlkPhos  61  10-26      Urinalysis Basic - ( 26 Oct 2023 06:16 )    Color: x / Appearance: x / SG: x / pH: x  Gluc: 87 mg/dL / Ketone: x  / Bili: x / Urobili: x   Blood: x / Protein: x / Nitrite: x   Leuk Esterase: x / RBC: x / WBC x   Sq Epi: x / Non Sq Epi: x / Bacteria: x      CAPILLARY BLOOD GLUCOSE      RADIOLOGY & ADDITIONAL TESTS:  < from: Xray Foot AP + Lateral, Left (10.24.23 @ 23:43) >  IMPRESSION:  1.  No plain film evidence of osteomyelitis or subcutaneous emphysema.    < end of copied text >  Pending MR Foot and CT Lumbar Spine with Contrast    Imaging Personally Reviewed:  [X] YES  [ ] NO  Consultant(s) Notes Reviewed:  [X] YES  [ ] NO  Care Discussed with Consultants/Other Providers [X] YES  [ ] NO  Plan of Care discussed with House Staff: [X]YES [ ] NO

## 2023-10-26 NOTE — PROGRESS NOTE ADULT - SUBJECTIVE AND OBJECTIVE BOX
[facility]  INFECTIOUS DISEASES at [place] / [place] / [place]  =======================================================                               Perry Carreno MD#   Zuleyka Mccabe MD*                             Lizzy Acosta MD*   Barbara Luna MD*                              Professor Emeritus:  Dr Bebeto Mccall MD^            Diplomates American Board of Internal Medicine & Infectious Diseases                # [place] Office - Appt - Tel  [phone] Fax [phone]                * [place] Office - Appt - Tel [phone] Fax [phone]                      ^[place] Office - Tel  [phone] Fax [phone]                                  Hospital Consult line:  [phone]  =======================================================    CLARENCE VITALE [id number]    Follow up: Positive blood cultures    no fevers       Allergies:  penicillin (Unknown)  Milk (Unknown)  amoxicillin (Anaphylaxis)  citrus (Unknown)        REVIEW OF SYSTEMS:  CONSTITUTIONAL:  No Fever. Occasional chills  HEENT:  No diplopia or blurred vision.  No earache, sore throat or runny nose.  CARDIOVASCULAR:  No chest pain  RESPIRATORY:  No cough, shortness of breath  GASTROINTESTINAL:  No nausea, vomiting or diarrhea.  GENITOURINARY:  No dysuria, frequency or urgency. No Blood in urine  MUSCULOSKELETAL:  no joint aches, no muscle pain  SKIN:  No change in skin, hair or nails.  NEUROLOGIC:  No Headaches      Physical Exam:  GEN: NAD  HEENT: normocephalic and atraumatic. EOMI. PERRL.    NECK: Supple.   LUNGS: CTA B/L.  HEART: RRR  ABDOMEN: Soft, NT, ND.  +BS.    : No CVA tenderness  EXTREMITIES: Without  edema.  MSK: No joint swelling  NEUROLOGIC: No Focal Deficits   PSYCHIATRIC: Appropriate affect .  SKIN: No rash      Vitals:  T(F): 97.5 (26 Oct 2023 05:06), Max: 98.6 (25 Oct 2023 21:13)  HR: 65 (26 Oct 2023 05:06)  BP: 114/70 (26 Oct 2023 05:06)  RR: 18 (26 Oct 2023 05:06)  SpO2: 98% (26 Oct 2023 05:06) (96% - 98%)  temp max in last 48H T(F): , Max: 98.6 (10-25-23 @ 04:12)    Current Antibiotics:  vancomycin  IVPB 1250 milliGRAM(s) IV Intermittent every 12 hours    Other medications:  busPIRone 30 milliGRAM(s) Oral two times a day  enoxaparin Injectable 40 milliGRAM(s) SubCutaneous every 24 hours  hydrALAZINE 50 milliGRAM(s) Oral two times a day  metoprolol succinate  milliGRAM(s) Oral daily  pantoprazole    Tablet 40 milliGRAM(s) Oral before breakfast  sertraline 100 milliGRAM(s) Oral daily                 13.1   5.38  )-----------( 237      ( 26 Oct 2023 06:16 )             39.7     10-26    142  |  104  |  4.9<L>  ----------------------------<  87  4.2   |  27.0  |  0.71    Ca    8.6      26 Oct 2023 06:16    TPro  6.6  /  Alb  3.6  /  TBili  0.4  /  DBili  x   /  AST  12  /  ALT  <5  /  AlkPhos  61  10-26    RECENT CULTURES:  10-25 @ 00:20 .Blood Blood-Peripheral     No growth at 24 hours    10-25 @ 00:15 .Blood Blood-Peripheral     No growth at 24 hours      WBC Count: 5.38 K/uL (10-26-23 @ 06:16)  WBC Count: 5.86 K/uL (10-25-23 @ 10:28)  WBC Count: 6.67 K/uL (10-25-23 @ 00:20)    Creatinine: 0.71 mg/dL (10-26-23 @ 06:16)  Creatinine: 0.73 mg/dL (10-25-23 @ 10:28)  Creatinine: 0.82 mg/dL (10-25-23 @ 00:20)    C-Reactive Protein, Serum: 5 mg/L (10-25-23 @ 00:20)    Sedimentation Rate, Erythrocyte: 40 mm/hr (10-25-23 @ 00:20)         < from: TTE Echo Complete w/ Contrast w/ Doppler (10.25.23 @ 11:48) >  Summary:   1. Technically difficult study.   2. Mildly enlarged left atrium.   3. Normal wall motion. Left ventricular ejection fraction, by visual   estimation, is 55 to 60%.   4. Normal right atrial size.   5. Normal right ventricular size and function.   6. Mild pulmonic valve regurgitation.   7. There is no evidence of pericardial effusion.   8. No cardiac mass or vegetations visualized. However, TUCKER is a more   sensitive test to evaluate for these findings.    < end of copied text >

## 2023-10-26 NOTE — PROGRESS NOTE ADULT - ASSESSMENT
48-year-old female with a complicated medical h/o L4/5 decompression done on January 13, 2019 at [facility]. At that time she had cauda equina syndrome and was admitted in February 2019 for wound infection from that surgery. She had MSSA growing from the incision site and had a I&D done on February 22, 2019. She was discharged on IV cefazolin which she completed in April 4, 2019. She followed up with us after that time. Stopped following up after April 21 2020. She recently was seen at [facility] emergency room for skin peeling. She was discharged from the emergency room after her labs and work-up was within normal limits. Patient is concerned for any infection since she had a severe infection in 2019 and is traumatized from that, so was seen in the office on 5/16/23 and had a negative work up. Patient was seen in the office on 10/23 with c/o chills and drainage from the left foot. Blood cultures and blood work was obtained in the office. Blood cultures with GPC. Patient was referred to the ER. In the Hospital patient has been afebrile, no leukocytosis. Started on Vancomycin and meropenem.       Positive blood cultures  Chills  Left foot drainage from wound    - Blood cultures in the office on 10/23 with CoNS  - Repeat blood cultures 10/25 no growth   - XR Foot with no acute findings   - Check CT or MRI foot  - Podiatry eval noted   - Procalcitonin level in office < 0.03  - CRP 5  - ESR 40  - Continue Vancomycin  - Monitor trough, will order next trough  - Monitor for Vancomycin toxicity   - Vancomycin required at this time pending culture results  - Monitor Cr while on Vancomycin, will order Cr for AM  - D/C Meropenem   - TTE reporting no veg   - Follow up cultures  - Trend Fever  - Trend WBC      Will Follow    Discussed treatment plan with: Dr Ospina

## 2023-10-26 NOTE — PATIENT PROFILE ADULT - NSPROGENOTHERPROVIDER_GEN_A_NUR
none

## 2023-10-26 NOTE — PROGRESS NOTE ADULT - ASSESSMENT
ASSESSMENT:  48F with PMHX Cauda Equina Syndrome, Chronic Back Pain, Hx DM2 (no longer on meds), HTN, MDD, Anxiety, Chronic Opiate Use, Tobacco Abuse admitted for GPC Bacteremia 2/2 Infected L Foot Wound.    PLAN:  #GPC Bacteremia 2/2 Infected L Foot Wound  -Labs/Inflammatory markers reviewed  -XR Foot no evidence of osteomyelitis or subq emphysema  -Outpatient BCX x2 +GPC Clusters speciation pending  -Repeat BCX x2 negative  -MRSA PCR negative  -Wound Cx L Foot drainage if able   -Vanco 1g q12   -Severe PCN Allergy (anaphylaxis) noted   -Meropenem 1g q8 for now  -TTE: normal, negative for vegetation or cardiac mass  -ID recs appreciated  -Podiatry recs appreciated    HTN  -Metoprolol Succinate 100mg q24  -Hydralazine 50mg BID    MDD, Anxiety  -Zoloft 100mg q24  -Buspar 30mg BID    Chronic LBP/Cauda Equina Syndrome  -Oxycodone IR 15mg q4 PRN    GERD  -Nexium 40mg q24    Hx DM2  -Reportedly no longer diabetic/taking meds s/p weight loss  -pending Hba1c    VTE PPX   -SCD/LMWH 40 q24  Diet: DASH/TLC  Dispo:

## 2023-10-26 NOTE — PROGRESS NOTE ADULT - SUBJECTIVE AND OBJECTIVE BOX
Chief Complaint : Patient is a 48y old  Female who presents with a chief complaint of GPC Bacteremia 2/2 infected L foot wound (25 Oct 2023 12:19)    Podiatry Interview HPI: Patient seen bedside in no acute distress. Denies any pain today. Admits to some chills today. Denies any other pedal issues or any constitutional symptoms.     Podiatry HPI: 48y year old Female seen at bedside for Left foot ulceration.  Patient relates to having the ulceration for couple of months but unsure in regards to exact timeline. States that the area drains time to time and then heals up. States that she saw a podiatrist named Dr. Li who gave her the Orthosis for her left lower extremity which is paralyzed.  Denies seeing anyone for the wound. Denies any other pedal complaints. No other complaints at this time.     HPI : HPI:  48F with PMHX Cauda Equina Syndrome, Chronic Back Pain, Hx DM2 (no longer on meds), HTN, MDD, Anxiety, Chronic Opiate Use, Tobacco Abuse sent to [facility] ER by outpatient ID Specialist after outpatient workup resulted with positive blood cultures. BCX +GPC Clusters x2 speciation pending. Repeat BCX x2 obtained. Patient given Vanco 1g IVx 1 in ED. Infected L Foot wound +purulent drainage for past few weeks likely source of bacteremia. She does not know when the wound occurred/became infected. Wears a brace/boot for the past month. XR pending. Pt seen/examined. Requesting chronic pain meds. No other complaints.    ROS negative unless mentioned. (25 Oct 2023 04:23)    Medications acetaminophen     Tablet .. 650 milliGRAM(s) Oral every 6 hours PRN  aluminum hydroxide/magnesium hydroxide/simethicone Suspension 30 milliLiter(s) Oral every 4 hours PRN  busPIRone 30 milliGRAM(s) Oral two times a day  enoxaparin Injectable 40 milliGRAM(s) SubCutaneous every 24 hours  hydrALAZINE 50 milliGRAM(s) Oral two times a day  melatonin 3 milliGRAM(s) Oral at bedtime PRN  metoprolol succinate  milliGRAM(s) Oral daily  morphine  - Injectable 2 milliGRAM(s) IV Push every 4 hours PRN  ondansetron Injectable 4 milliGRAM(s) IV Push every 8 hours PRN  oxyCODONE    IR 15 milliGRAM(s) Oral every 4 hours PRN  pantoprazole    Tablet 40 milliGRAM(s) Oral before breakfast  sertraline 100 milliGRAM(s) Oral daily  vancomycin  IVPB 1250 milliGRAM(s) IV Intermittent every 12 hours    FHNo pertinent family history in first degree relatives    ,   PMHDental infection    Hypertension, unspecified type    Diabetes mellitus    Sciatica    Chronic low back pain, unspecified back pain laterality, unspecified whether sciatica present       PSHHistory of cholecystectomy    H/O Spinal surgery        Labs                          13.1   5.38  )-----------( 237      ( 26 Oct 2023 06:16 )             39.7      10-26    142  |  104  |  4.9<L>  ----------------------------<  87  4.2   |  27.0  |  0.71    Ca    8.6      26 Oct 2023 06:16    TPro  6.6  /  Alb  3.6  /  TBili  0.4  /  DBili  x   /  AST  12  /  ALT  <5  /  AlkPhos  61  10-26     Vital Signs Last 24 Hrs  T(C): 36.4 (26 Oct 2023 05:06), Max: 37 (25 Oct 2023 21:13)  T(F): 97.5 (26 Oct 2023 05:06), Max: 98.6 (25 Oct 2023 21:13)  HR: 65 (26 Oct 2023 05:06) (65 - 72)  BP: 114/70 (26 Oct 2023 05:06) (114/70 - 160/83)  BP(mean): --  RR: 18 (26 Oct 2023 05:06) (18 - 18)  SpO2: 98% (26 Oct 2023 05:06) (96% - 98%)    Parameters below as of 26 Oct 2023 05:06  Patient On (Oxygen Delivery Method): room air      Sedimentation Rate, Erythrocyte: 40 mm/hr (10-25-23 @ 00:20)         C-Reactive Protein, Serum: 5 mg/L (10-25-23 @ 00:20)   WBC Count: 5.38 K/uL (10-26-23 @ 06:16)  Physical Exam:     Integument:  Skin warm, dry and supple bilateral.    Ulceration Left Plantar sub met 3-5 ulcer with, no hyperkeratotic border, macerated wound base , wound size (1.5cm X 1.5cm), - edema, - jeanne-wound erythema, - purulence, - fluctuance, - tracking/tunneling, - probe to bone, - malodor.   Vascular: Dorsalis Pedis and Posterior Tibial pulses 2/4.  Capillary re-fill time less then 3 seconds digits 1-5 bilateral.    Neuro: Protective sensation diminished to the left lower extremity  MSK: left lower extremity paralysis with no muscle strength.     A: Left foot ulceration      P:   Chart reviewed and Patient evaluated  Discussed diagnosis and treatment with patient  Wound flush with normal saline  Applied xeroform with dry sterile dressing  X-rays reviewed  Recommend MRI   Continue with IV antibiotics   Recc ID consult  GERMAN pending  Offloading to bilateral Heels.   Discussed importance of daily foot examinations and proper shoe gear and to importance of lower Fasting Blood Glucose levels.   Podiatry will follow while in house.  Discussed with Attending Dr. Kim

## 2023-10-27 LAB
A1C WITH ESTIMATED AVERAGE GLUCOSE RESULT: 6 % — HIGH (ref 4–5.6)
A1C WITH ESTIMATED AVERAGE GLUCOSE RESULT: 6 % — HIGH (ref 4–5.6)
ALBUMIN SERPL ELPH-MCNC: 3.8 G/DL — SIGNIFICANT CHANGE UP (ref 3.3–5.2)
ALBUMIN SERPL ELPH-MCNC: 3.8 G/DL — SIGNIFICANT CHANGE UP (ref 3.3–5.2)
ALP SERPL-CCNC: 67 U/L — SIGNIFICANT CHANGE UP (ref 40–120)
ALP SERPL-CCNC: 67 U/L — SIGNIFICANT CHANGE UP (ref 40–120)
ALT FLD-CCNC: 6 U/L — SIGNIFICANT CHANGE UP
ALT FLD-CCNC: 6 U/L — SIGNIFICANT CHANGE UP
ANION GAP SERPL CALC-SCNC: 11 MMOL/L — SIGNIFICANT CHANGE UP (ref 5–17)
ANION GAP SERPL CALC-SCNC: 11 MMOL/L — SIGNIFICANT CHANGE UP (ref 5–17)
AST SERPL-CCNC: 14 U/L — SIGNIFICANT CHANGE UP
AST SERPL-CCNC: 14 U/L — SIGNIFICANT CHANGE UP
BACTERIA BLD CULT: ABNORMAL
BILIRUB SERPL-MCNC: 0.3 MG/DL — LOW (ref 0.4–2)
BILIRUB SERPL-MCNC: 0.3 MG/DL — LOW (ref 0.4–2)
BUN SERPL-MCNC: 6.4 MG/DL — LOW (ref 8–20)
BUN SERPL-MCNC: 6.4 MG/DL — LOW (ref 8–20)
CALCIUM SERPL-MCNC: 8.9 MG/DL — SIGNIFICANT CHANGE UP (ref 8.4–10.5)
CALCIUM SERPL-MCNC: 8.9 MG/DL — SIGNIFICANT CHANGE UP (ref 8.4–10.5)
CHLORIDE SERPL-SCNC: 98 MMOL/L — SIGNIFICANT CHANGE UP (ref 96–108)
CHLORIDE SERPL-SCNC: 98 MMOL/L — SIGNIFICANT CHANGE UP (ref 96–108)
CO2 SERPL-SCNC: 26 MMOL/L — SIGNIFICANT CHANGE UP (ref 22–29)
CO2 SERPL-SCNC: 26 MMOL/L — SIGNIFICANT CHANGE UP (ref 22–29)
CREAT SERPL-MCNC: 0.7 MG/DL — SIGNIFICANT CHANGE UP (ref 0.5–1.3)
CREAT SERPL-MCNC: 0.7 MG/DL — SIGNIFICANT CHANGE UP (ref 0.5–1.3)
EGFR: 108 ML/MIN/1.73M2 — SIGNIFICANT CHANGE UP
EGFR: 108 ML/MIN/1.73M2 — SIGNIFICANT CHANGE UP
ESTIMATED AVERAGE GLUCOSE: 126 MG/DL — HIGH (ref 68–114)
ESTIMATED AVERAGE GLUCOSE: 126 MG/DL — HIGH (ref 68–114)
GLUCOSE SERPL-MCNC: 118 MG/DL — HIGH (ref 70–99)
GLUCOSE SERPL-MCNC: 118 MG/DL — HIGH (ref 70–99)
HCT VFR BLD CALC: 38.8 % — SIGNIFICANT CHANGE UP (ref 34.5–45)
HCT VFR BLD CALC: 38.8 % — SIGNIFICANT CHANGE UP (ref 34.5–45)
HGB BLD-MCNC: 13.3 G/DL — SIGNIFICANT CHANGE UP (ref 11.5–15.5)
HGB BLD-MCNC: 13.3 G/DL — SIGNIFICANT CHANGE UP (ref 11.5–15.5)
MCHC RBC-ENTMCNC: 31 PG — SIGNIFICANT CHANGE UP (ref 27–34)
MCHC RBC-ENTMCNC: 31 PG — SIGNIFICANT CHANGE UP (ref 27–34)
MCHC RBC-ENTMCNC: 34.3 GM/DL — SIGNIFICANT CHANGE UP (ref 32–36)
MCHC RBC-ENTMCNC: 34.3 GM/DL — SIGNIFICANT CHANGE UP (ref 32–36)
MCV RBC AUTO: 90.4 FL — SIGNIFICANT CHANGE UP (ref 80–100)
MCV RBC AUTO: 90.4 FL — SIGNIFICANT CHANGE UP (ref 80–100)
PLATELET # BLD AUTO: 260 K/UL — SIGNIFICANT CHANGE UP (ref 150–400)
PLATELET # BLD AUTO: 260 K/UL — SIGNIFICANT CHANGE UP (ref 150–400)
POTASSIUM SERPL-MCNC: 4 MMOL/L — SIGNIFICANT CHANGE UP (ref 3.5–5.3)
POTASSIUM SERPL-MCNC: 4 MMOL/L — SIGNIFICANT CHANGE UP (ref 3.5–5.3)
POTASSIUM SERPL-SCNC: 4 MMOL/L — SIGNIFICANT CHANGE UP (ref 3.5–5.3)
POTASSIUM SERPL-SCNC: 4 MMOL/L — SIGNIFICANT CHANGE UP (ref 3.5–5.3)
PROT SERPL-MCNC: 7.3 G/DL — SIGNIFICANT CHANGE UP (ref 6.6–8.7)
PROT SERPL-MCNC: 7.3 G/DL — SIGNIFICANT CHANGE UP (ref 6.6–8.7)
RBC # BLD: 4.29 M/UL — SIGNIFICANT CHANGE UP (ref 3.8–5.2)
RBC # BLD: 4.29 M/UL — SIGNIFICANT CHANGE UP (ref 3.8–5.2)
RBC # FLD: 14.5 % — SIGNIFICANT CHANGE UP (ref 10.3–14.5)
RBC # FLD: 14.5 % — SIGNIFICANT CHANGE UP (ref 10.3–14.5)
SODIUM SERPL-SCNC: 135 MMOL/L — SIGNIFICANT CHANGE UP (ref 135–145)
SODIUM SERPL-SCNC: 135 MMOL/L — SIGNIFICANT CHANGE UP (ref 135–145)
WBC # BLD: 6.95 K/UL — SIGNIFICANT CHANGE UP (ref 3.8–10.5)
WBC # BLD: 6.95 K/UL — SIGNIFICANT CHANGE UP (ref 3.8–10.5)
WBC # FLD AUTO: 6.95 K/UL — SIGNIFICANT CHANGE UP (ref 3.8–10.5)
WBC # FLD AUTO: 6.95 K/UL — SIGNIFICANT CHANGE UP (ref 3.8–10.5)

## 2023-10-27 PROCEDURE — 99232 SBSQ HOSP IP/OBS MODERATE 35: CPT

## 2023-10-27 PROCEDURE — 99233 SBSQ HOSP IP/OBS HIGH 50: CPT

## 2023-10-27 RX ORDER — INFLUENZA VIRUS VACCINE 15; 15; 15; 15 UG/.5ML; UG/.5ML; UG/.5ML; UG/.5ML
0.5 SUSPENSION INTRAMUSCULAR ONCE
Refills: 0 | Status: DISCONTINUED | OUTPATIENT
Start: 2023-10-27 | End: 2023-11-01

## 2023-10-27 RX ADMIN — Medication 100 MILLIGRAM(S): at 06:09

## 2023-10-27 RX ADMIN — Medication 166.67 MILLIGRAM(S): at 00:03

## 2023-10-27 RX ADMIN — OXYCODONE HYDROCHLORIDE 15 MILLIGRAM(S): 5 TABLET ORAL at 20:56

## 2023-10-27 RX ADMIN — Medication 30 MILLIGRAM(S): at 17:25

## 2023-10-27 RX ADMIN — ENOXAPARIN SODIUM 40 MILLIGRAM(S): 100 INJECTION SUBCUTANEOUS at 06:10

## 2023-10-27 RX ADMIN — Medication 50 MILLIGRAM(S): at 17:25

## 2023-10-27 RX ADMIN — Medication 166.67 MILLIGRAM(S): at 12:51

## 2023-10-27 RX ADMIN — PANTOPRAZOLE SODIUM 40 MILLIGRAM(S): 20 TABLET, DELAYED RELEASE ORAL at 06:07

## 2023-10-27 RX ADMIN — OXYCODONE HYDROCHLORIDE 15 MILLIGRAM(S): 5 TABLET ORAL at 15:19

## 2023-10-27 RX ADMIN — OXYCODONE HYDROCHLORIDE 15 MILLIGRAM(S): 5 TABLET ORAL at 02:35

## 2023-10-27 RX ADMIN — OXYCODONE HYDROCHLORIDE 15 MILLIGRAM(S): 5 TABLET ORAL at 07:01

## 2023-10-27 RX ADMIN — OXYCODONE HYDROCHLORIDE 15 MILLIGRAM(S): 5 TABLET ORAL at 21:56

## 2023-10-27 RX ADMIN — OXYCODONE HYDROCHLORIDE 15 MILLIGRAM(S): 5 TABLET ORAL at 16:15

## 2023-10-27 RX ADMIN — OXYCODONE HYDROCHLORIDE 15 MILLIGRAM(S): 5 TABLET ORAL at 03:35

## 2023-10-27 RX ADMIN — Medication 30 MILLIGRAM(S): at 06:05

## 2023-10-27 RX ADMIN — OXYCODONE HYDROCHLORIDE 15 MILLIGRAM(S): 5 TABLET ORAL at 11:20

## 2023-10-27 RX ADMIN — SERTRALINE 100 MILLIGRAM(S): 25 TABLET, FILM COATED ORAL at 12:50

## 2023-10-27 RX ADMIN — OXYCODONE HYDROCHLORIDE 15 MILLIGRAM(S): 5 TABLET ORAL at 06:01

## 2023-10-27 RX ADMIN — Medication 50 MILLIGRAM(S): at 06:09

## 2023-10-27 RX ADMIN — OXYCODONE HYDROCHLORIDE 15 MILLIGRAM(S): 5 TABLET ORAL at 10:25

## 2023-10-27 NOTE — PROGRESS NOTE ADULT - ASSESSMENT
48-year-old female with a complicated medical h/o L4/5 decompression done on January 13, 2019 at [facility]. At that time she had cauda equina syndrome and was admitted in February 2019 for wound infection from that surgery. She had MSSA growing from the incision site and had a I&D done on February 22, 2019. She was discharged on IV cefazolin which she completed in April 4, 2019. She followed up with us after that time. Stopped following up after April 21 2020. She recently was seen at [facility] emergency room for skin peeling. She was discharged from the emergency room after her labs and work-up was within normal limits. Patient is concerned for any infection since she had a severe infection in 2019 and is traumatized from that, so was seen in the office on 5/16/23 and had a negative work up. Patient was seen in the office on 10/23 with c/o chills and drainage from the left foot. Blood cultures and blood work was obtained in the office. Blood cultures with GPC. Patient was referred to the ER. In the Hospital patient has been afebrile, no leukocytosis. Started on Vancomycin and meropenem.       Positive blood cultures  Chills  Left foot drainage from wound    - Blood cultures in the office on 10/23 with CoNS  - Repeat blood cultures 10/25 no growth   - XR Foot with no acute findings   - MRI foot pending  - Podiatry eval noted   - Procalcitonin level in office < 0.03  - CRP 5  - ESR 40  - Continue Vancomycin  - Monitor trough, will order next trough  - Monitor for Vancomycin toxicity   - Vancomycin required at this time pending culture results  - Monitor Cr while on Vancomycin, will order Cr for AM  - TTE reporting no veg   - Follow up cultures  - Trend Fever  - Trend WBC      Will Follow    Discussed treatment plan with: Dr Ospina

## 2023-10-27 NOTE — PROGRESS NOTE ADULT - SUBJECTIVE AND OBJECTIVE BOX
[facility]  INFECTIOUS DISEASES at [place] / [place] / [place]  =======================================================                               Perry Carreno MD#   Zuleyka Mccabe MD*                             Lizzy Acosta MD*   Barbraa Luna MD*                              Professor Emeritus:  Dr Bebeto Mccall MD^            Diplomates American Board of Internal Medicine & Infectious Diseases                # [place] Office - Appt - Tel  [phone] Fax [phone]                * [place] Office - Appt - Tel [phone] Fax [phone]                      ^[place] Office - Tel  [phone] Fax [phone]                                  Hospital Consult line:  [phone]  =======================================================    CLARENCE VITALE [id number]    Follow up: Positive blood cultures    no fevers       Allergies:  penicillin (Unknown)  Milk (Unknown)  amoxicillin (Anaphylaxis)  citrus (Unknown)        REVIEW OF SYSTEMS:  CONSTITUTIONAL:  No Fever. Occasional chills  HEENT:  No diplopia or blurred vision.  No earache, sore throat or runny nose.  CARDIOVASCULAR:  No chest pain  RESPIRATORY:  No cough, shortness of breath  GASTROINTESTINAL:  No nausea, vomiting or diarrhea.  GENITOURINARY:  No dysuria, frequency or urgency. No Blood in urine  MUSCULOSKELETAL:  no joint aches, no muscle pain  SKIN:  No change in skin, hair or nails.  NEUROLOGIC:  No Headaches      Physical Exam:  GEN: NAD  HEENT: normocephalic and atraumatic. EOMI. PERRL.    NECK: Supple.   LUNGS: CTA B/L.  HEART: RRR  ABDOMEN: Soft, NT, ND.  +BS.    : No CVA tenderness  EXTREMITIES: Without  edema.  MSK: No joint swelling  NEUROLOGIC: No Focal Deficits   PSYCHIATRIC: Appropriate affect .  SKIN: No rash      Vitals:  T(F): 97.5 (26 Oct 2023 05:06), Max: 98.6 (25 Oct 2023 21:13)  HR: 65 (26 Oct 2023 05:06)  BP: 114/70 (26 Oct 2023 05:06)  RR: 18 (26 Oct 2023 05:06)  SpO2: 98% (26 Oct 2023 05:06) (96% - 98%)  temp max in last 48H T(F): , Max: 98.6 (10-25-23 @ 04:12)    Current Antibiotics:  vancomycin  IVPB 1250 milliGRAM(s) IV Intermittent every 12 hours    Other medications:  busPIRone 30 milliGRAM(s) Oral two times a day  enoxaparin Injectable 40 milliGRAM(s) SubCutaneous every 24 hours  hydrALAZINE 50 milliGRAM(s) Oral two times a day  metoprolol succinate  milliGRAM(s) Oral daily  pantoprazole    Tablet 40 milliGRAM(s) Oral before breakfast  sertraline 100 milliGRAM(s) Oral daily                 13.1   5.38  )-----------( 237      ( 26 Oct 2023 06:16 )             39.7     10-26    142  |  104  |  4.9<L>  ----------------------------<  87  4.2   |  27.0  |  0.71    Ca    8.6      26 Oct 2023 06:16    TPro  6.6  /  Alb  3.6  /  TBili  0.4  /  DBili  x   /  AST  12  /  ALT  <5  /  AlkPhos  61  10-26    RECENT CULTURES:  10-25 @ 00:20 .Blood Blood-Peripheral     No growth at 24 hours    10-25 @ 00:15 .Blood Blood-Peripheral     No growth at 24 hours      WBC Count: 5.38 K/uL (10-26-23 @ 06:16)  WBC Count: 5.86 K/uL (10-25-23 @ 10:28)  WBC Count: 6.67 K/uL (10-25-23 @ 00:20)    Creatinine: 0.71 mg/dL (10-26-23 @ 06:16)  Creatinine: 0.73 mg/dL (10-25-23 @ 10:28)  Creatinine: 0.82 mg/dL (10-25-23 @ 00:20)    C-Reactive Protein, Serum: 5 mg/L (10-25-23 @ 00:20)    Sedimentation Rate, Erythrocyte: 40 mm/hr (10-25-23 @ 00:20)         < from: TTE Echo Complete w/ Contrast w/ Doppler (10.25.23 @ 11:48) >  Summary:   1. Technically difficult study.   2. Mildly enlarged left atrium.   3. Normal wall motion. Left ventricular ejection fraction, by visual   estimation, is 55 to 60%.   4. Normal right atrial size.   5. Normal right ventricular size and function.   6. Mild pulmonic valve regurgitation.   7. There is no evidence of pericardial effusion.   8. No cardiac mass or vegetations visualized. However, TUCKER is a more   sensitive test to evaluate for these findings.    < end of copied text >

## 2023-10-27 NOTE — PROGRESS NOTE ADULT - SUBJECTIVE AND OBJECTIVE BOX
Chief Complaint : Patient is a 48y old  Female who presents with a chief complaint of GPC Bacteremia 2/2 infected L foot wound (25 Oct 2023 12:19)    Podiatry Interview HPI: Patient seen bedside in no acute distress. Denies any pain today. Denies any other pedal issues or any constitutional symptoms.     Podiatry HPI: 48y year old Female seen at bedside for Left foot ulceration.  Patient relates to having the ulceration for couple of months but unsure in regards to exact timeline. States that the area drains time to time and then heals up. States that she saw a podiatrist named Dr. Li who gave her the Orthosis for her left lower extremity which is paralyzed.  Denies seeing anyone for the wound. Denies any other pedal complaints. No other complaints at this time.     HPI : HPI:  48F with PMHX Cauda Equina Syndrome, Chronic Back Pain, Hx DM2 (no longer on meds), HTN, MDD, Anxiety, Chronic Opiate Use, Tobacco Abuse sent to [facility] ER by outpatient ID Specialist after outpatient workup resulted with positive blood cultures. BCX +GPC Clusters x2 speciation pending. Repeat BCX x2 obtained. Patient given Vanco 1g IVx 1 in ED. Infected L Foot wound +purulent drainage for past few weeks likely source of bacteremia. She does not know when the wound occurred/became infected. Wears a brace/boot for the past month. XR pending. Pt seen/examined. Requesting chronic pain meds. No other complaints.    ROS negative unless mentioned. (25 Oct 2023 04:23)    Medications acetaminophen     Tablet .. 650 milliGRAM(s) Oral every 6 hours PRN  aluminum hydroxide/magnesium hydroxide/simethicone Suspension 30 milliLiter(s) Oral every 4 hours PRN  busPIRone 30 milliGRAM(s) Oral two times a day  enoxaparin Injectable 40 milliGRAM(s) SubCutaneous every 24 hours  hydrALAZINE 50 milliGRAM(s) Oral two times a day  influenza   Vaccine 0.5 milliLiter(s) IntraMuscular once  melatonin 3 milliGRAM(s) Oral at bedtime PRN  metoprolol succinate  milliGRAM(s) Oral daily  morphine  - Injectable 2 milliGRAM(s) IV Push every 4 hours PRN  ondansetron Injectable 4 milliGRAM(s) IV Push every 8 hours PRN  oxyCODONE    IR 15 milliGRAM(s) Oral every 4 hours PRN  pantoprazole    Tablet 40 milliGRAM(s) Oral before breakfast  sertraline 100 milliGRAM(s) Oral daily  vancomycin  IVPB 1250 milliGRAM(s) IV Intermittent every 12 hours    FHNo pertinent family history in first degree relatives    ,   PMHDental infection    Hypertension, unspecified type    Diabetes mellitus    Sciatica    Chronic low back pain, unspecified back pain laterality, unspecified whether sciatica present       PSHHistory of cholecystectomy    H/O Spinal surgery        Labs                          13.3   6.95  )-----------( 260      ( 27 Oct 2023 06:25 )             38.8      10-27    135  |  98  |  6.4<L>  ----------------------------<  118<H>  4.0   |  26.0  |  0.70    Ca    8.9      27 Oct 2023 06:25    TPro  7.3  /  Alb  3.8  /  TBili  0.3<L>  /  DBili  x   /  AST  14  /  ALT  6   /  AlkPhos  67  10-27     Vital Signs Last 24 Hrs  T(C): 37 (27 Oct 2023 09:00), Max: 37 (27 Oct 2023 09:00)  T(F): 98.6 (27 Oct 2023 09:00), Max: 98.6 (27 Oct 2023 09:00)  HR: 62 (27 Oct 2023 09:00) (62 - 71)  BP: 115/77 (27 Oct 2023 09:00) (101/66 - 135/78)  BP(mean): --  RR: 18 (27 Oct 2023 09:00) (18 - 18)  SpO2: 99% (27 Oct 2023 09:00) (95% - 99%)    Parameters below as of 27 Oct 2023 09:00  Patient On (Oxygen Delivery Method): room air      Sedimentation Rate, Erythrocyte: 45 mm/hr (10-26-23 @ 16:29)  Sedimentation Rate, Erythrocyte: 40 mm/hr (10-25-23 @ 00:20)         C-Reactive Protein, Serum: 18 mg/L (10-26-23 @ 16:29)  C-Reactive Protein, Serum: 5 mg/L (10-25-23 @ 00:20)   WBC Count: 6.95 K/uL (10-27-23 @ 06:25)      Physical Exam:     Integument:  Skin warm, dry and supple bilateral.    Ulceration Left Plantar sub met 3-5 ulcer with, no hyperkeratotic border, macerated wound base , wound size (1.5cm X 1.5cm), - edema, - jeanne-wound erythema, - purulence, - fluctuance, - tracking/tunneling, - probe to bone, - malodor.   Vascular: Dorsalis Pedis and Posterior Tibial pulses 2/4.  Capillary re-fill time less then 3 seconds digits 1-5 bilateral.    Neuro: Protective sensation diminished to the left lower extremity  MSK: left lower extremity paralysis with no muscle strength.     A: Left foot ulceration      P:   Chart reviewed and Patient evaluated  Discussed diagnosis and treatment with patient  Wound flush with normal saline  Applied xeroform with dry sterile dressing  X-rays reviewed  Pending MRI   Continue with IV antibiotics   Recc ID consult  GERMAN reviewed  Offloading to bilateral Heels.   Discussed importance of daily foot examinations and proper shoe gear and to importance of lower Fasting Blood Glucose levels.   Podiatry will follow while in house.  Discussed with and seen bedside with Dr. Byrd

## 2023-10-27 NOTE — PROGRESS NOTE ADULT - SUBJECTIVE AND OBJECTIVE BOX
[facility] Division of Hospital Medicine  Jorge Ospina MD    Chief Complaint:  Patient is a 48y old  Female who presents with a chief complaint of GPC Bacteremia 2/2 infected L foot wound (26 Oct 2023 15:43)      SUBJECTIVE / OVERNIGHT EVENTS:  Patient seen and examined at bedside. No acute events reported overnight Complaining of worsening back pain.     MEDICATIONS  (STANDING):  busPIRone 30 milliGRAM(s) Oral two times a day  enoxaparin Injectable 40 milliGRAM(s) SubCutaneous every 24 hours  hydrALAZINE 50 milliGRAM(s) Oral two times a day  influenza   Vaccine 0.5 milliLiter(s) IntraMuscular once  metoprolol succinate  milliGRAM(s) Oral daily  pantoprazole    Tablet 40 milliGRAM(s) Oral before breakfast  sertraline 100 milliGRAM(s) Oral daily  vancomycin  IVPB 1250 milliGRAM(s) IV Intermittent every 12 hours    MEDICATIONS  (PRN):  acetaminophen     Tablet .. 650 milliGRAM(s) Oral every 6 hours PRN Temp greater or equal to 38C (100.4F), Mild Pain (1 - 3)  aluminum hydroxide/magnesium hydroxide/simethicone Suspension 30 milliLiter(s) Oral every 4 hours PRN Dyspepsia  melatonin 3 milliGRAM(s) Oral at bedtime PRN Insomnia  morphine  - Injectable 2 milliGRAM(s) IV Push every 4 hours PRN Severe Pain (7 - 10)  ondansetron Injectable 4 milliGRAM(s) IV Push every 8 hours PRN Nausea and/or Vomiting  oxyCODONE    IR 15 milliGRAM(s) Oral every 4 hours PRN Severe Pain (7 - 10)        I&O's Summary      PHYSICAL EXAM:  Vital Signs Last 24 Hrs  T(C): 37 (27 Oct 2023 09:00), Max: 37 (27 Oct 2023 09:00)  T(F): 98.6 (27 Oct 2023 09:00), Max: 98.6 (27 Oct 2023 09:00)  HR: 62 (27 Oct 2023 09:00) (62 - 71)  BP: 115/77 (27 Oct 2023 09:00) (101/66 - 135/78)  BP(mean): --  RR: 18 (27 Oct 2023 09:00) (18 - 18)  SpO2: 99% (27 Oct 2023 09:00) (95% - 99%)    Parameters below as of 27 Oct 2023 09:00  Patient On (Oxygen Delivery Method): room air            CONSTITUTIONAL: NAD  HEENT: NC/AT, PERRL, no JVD  RESPIRATORY: CTA bilaterally, normal effort  CARDIOVASCULAR: RRR, S1/S2+, no m/g/r  ABDOMEN: Nontender to palpation, normoactive bowel sounds, no rebound/guarding  MUSCULOSKELETAL: LBP ttp, No edema, cyanosis or deformities.  PSYCH: Calm, affect appropriate.  NEUROLOGY: Awake, alert, no focal neurological deficits.   SKIN: L heel wound  VASC: Distal pulses palpable    LABS:                        13.3   6.95  )-----------( 260      ( 27 Oct 2023 06:25 )             38.8     10-27    135  |  98  |  6.4<L>  ----------------------------<  118<H>  4.0   |  26.0  |  0.70    Ca    8.9      27 Oct 2023 06:25    TPro  7.3  /  Alb  3.8  /  TBili  0.3<L>  /  DBili  x   /  AST  14  /  ALT  6   /  AlkPhos  67  10-27          Urinalysis Basic - ( 27 Oct 2023 06:25 )    Color: x / Appearance: x / SG: x / pH: x  Gluc: 118 mg/dL / Ketone: x  / Bili: x / Urobili: x   Blood: x / Protein: x / Nitrite: x   Leuk Esterase: x / RBC: x / WBC x   Sq Epi: x / Non Sq Epi: x / Bacteria: x        Culture - Blood (collected 25 Oct 2023 00:20)  Source: .Blood Blood-Peripheral  Preliminary Report (27 Oct 2023 07:01):    No growth at 48 Hours    Culture - Blood (collected 25 Oct 2023 00:15)  Source: .Blood Blood-Peripheral  Preliminary Report (27 Oct 2023 07:01):    No growth at 48 Hours      CAPILLARY BLOOD GLUCOSE            RADIOLOGY & ADDITIONAL TESTS:  Results Reviewed:   Imaging Personally Reviewed:  Electrocardiogram Personally Reviewed:

## 2023-10-27 NOTE — PROGRESS NOTE ADULT - ASSESSMENT
48F with PMHX Cauda Equina Syndrome, Chronic Back Pain, Hx DM2 (no longer on meds), HTN, MDD, Anxiety, Chronic Opiate Use, Tobacco Abuse admitted for GPC Bacteremia 2/2 Infected L Foot Wound.    #GPC Bacteremia, L foot wound  - ID following  - Podiatry following  - Repeat BCx w/ NGTD  - Continue Vancomycin per ID  - TTE without vegetation  - Wound care per Podiatry  - GERMAN unremarkable    Acute on Chronic Back Pain  - On Oxycodone IR 15mg q4h PRN  - PM following  - CT reviewed - Small to moderate left paracentral disc protrusion L5-S1 with mass effect on the left S1 nerve root. Associated soft tissue within the left side of   the spinal canal may be related to the protruded/extruded disc or   left-sided facet joint.  - No signs of cauda equina symptoms, will monitor for symptoms with neuro checks.   - will order MRI w/wo contrast - if remarkable will consider spine consult    HTN  - Metoprolol Succinate 100mg q24  - Hydralazine 50mg BID    MDD, Anxiety  - Zoloft 100mg q24  - Buspar 30mg BID    GERD  - Nexium 40mg q24    Hx DM2  - A1C: 6  - Reportedly no longer diabetic/taking meds s/p weight loss    VTE PPX   -SCD/LMWH 40 q24    Diet: DASH/TLC 48F with PMHX Cauda Equina Syndrome, Chronic Back Pain, Hx DM2 (no longer on meds), HTN, MDD, Anxiety, Chronic Opiate Use, Tobacco Abuse admitted for GPC Bacteremia 2/2 Infected L Foot Wound.    #GPC Bacteremia, L foot wound  - ID following  - Podiatry following  - Repeat BCx w/ NGTD  - Continue Vancomycin per ID  - TTE without vegetation  - Wound care per Podiatry  - GERMAN unremarkable  - MRI L foot pending    Acute on Chronic Back Pain  - On Oxycodone IR 15mg q4h PRN  - PM following  - CT reviewed - Small to moderate left paracentral disc protrusion L5-S1 with mass effect on the left S1 nerve root. Associated soft tissue within the left side of   the spinal canal may be related to the protruded/extruded disc or   left-sided facet joint.  - No signs of cauda equina symptoms, will monitor for symptoms with neuro checks.   - will order MRI w/wo contrast - if remarkable will consider spine consult    HTN  - Metoprolol Succinate 100mg q24  - Hydralazine 50mg BID    MDD, Anxiety  - Zoloft 100mg q24  - Buspar 30mg BID    GERD  - Nexium 40mg q24    Hx DM2  - A1C: 6  - Reportedly no longer diabetic/taking meds s/p weight loss    VTE PPX   -SCD/LMWH 40 q24    Diet: DASH/TLC

## 2023-10-27 NOTE — CHART NOTE - NSCHARTNOTEFT_GEN_A_CORE
Called by RN overnight, pt with worsening back pain    Pt seen and examined at bedside. States she's feeling a new type of throbbing back pain that woke her up from sleep. States that the pain she has at baseline in her lower sacral region that radiates down to her buttocks would normally come and go however is now constant, more severe, and radiates down both legs. Pt states it feels like a throbbing pulling which is new for her. Also states she has new pain in her cervical spine region radiating down arms. Pt has baseline urinary/bowel incontinence. No other new complaints at this time    VS:  T(F): 98.1, 66, BP: 105/67, RR 18, SpO2: 99%     PE: PERRL b/l. EOMI. A&Ox4. Speech clear. No new focal neuro deficits. No spinal tenderness on palpation     Spoke with on call night josiah radiologist. States he does not see any signs of osteomyelitis and recommends MRI  MRI lumbar spine ordered  PRN Oxycodone for pain Called by RN overnight, pt with worsening back pain    Pt seen and examined at bedside. States she's feeling a new type of throbbing back pain that woke her up from sleep. States that the pain she has at baseline in her lower sacral region that radiates down to her buttocks would normally come and go however is now constant, more severe, and radiates down both legs. Pt states it feels like a throbbing pulling pain which is new for her. Also states she has new pain in her cervical spine region radiating down arms. Pt has baseline urinary/bowel incontinence. No other new complaints at this time    VS:  T(F): 98.1, 66, BP: 105/67, RR 18, SpO2: 99%     PE: PERRL b/l. EOMI. A&Ox4. Speech clear. No new focal neuro deficits. No spinal tenderness on palpation. No edema or erythema    Spoke with on call night luisk radiologist. States he does not see any signs of osteomyelitis and recommends MRI  MRI lumbar spine ordered  PRN Oxycodone for pain

## 2023-10-28 LAB
ALBUMIN SERPL ELPH-MCNC: 3.8 G/DL — SIGNIFICANT CHANGE UP (ref 3.3–5.2)
ALBUMIN SERPL ELPH-MCNC: 3.8 G/DL — SIGNIFICANT CHANGE UP (ref 3.3–5.2)
ALP SERPL-CCNC: 64 U/L — SIGNIFICANT CHANGE UP (ref 40–120)
ALP SERPL-CCNC: 64 U/L — SIGNIFICANT CHANGE UP (ref 40–120)
ALT FLD-CCNC: 6 U/L — SIGNIFICANT CHANGE UP
ALT FLD-CCNC: 6 U/L — SIGNIFICANT CHANGE UP
ANION GAP SERPL CALC-SCNC: 10 MMOL/L — SIGNIFICANT CHANGE UP (ref 5–17)
ANION GAP SERPL CALC-SCNC: 10 MMOL/L — SIGNIFICANT CHANGE UP (ref 5–17)
AST SERPL-CCNC: 18 U/L — SIGNIFICANT CHANGE UP
AST SERPL-CCNC: 18 U/L — SIGNIFICANT CHANGE UP
BILIRUB SERPL-MCNC: 0.3 MG/DL — LOW (ref 0.4–2)
BILIRUB SERPL-MCNC: 0.3 MG/DL — LOW (ref 0.4–2)
BUN SERPL-MCNC: 6 MG/DL — LOW (ref 8–20)
BUN SERPL-MCNC: 6 MG/DL — LOW (ref 8–20)
CALCIUM SERPL-MCNC: 8.9 MG/DL — SIGNIFICANT CHANGE UP (ref 8.4–10.5)
CALCIUM SERPL-MCNC: 8.9 MG/DL — SIGNIFICANT CHANGE UP (ref 8.4–10.5)
CHLORIDE SERPL-SCNC: 98 MMOL/L — SIGNIFICANT CHANGE UP (ref 96–108)
CHLORIDE SERPL-SCNC: 98 MMOL/L — SIGNIFICANT CHANGE UP (ref 96–108)
CO2 SERPL-SCNC: 29 MMOL/L — SIGNIFICANT CHANGE UP (ref 22–29)
CO2 SERPL-SCNC: 29 MMOL/L — SIGNIFICANT CHANGE UP (ref 22–29)
CREAT SERPL-MCNC: 0.76 MG/DL — SIGNIFICANT CHANGE UP (ref 0.5–1.3)
CREAT SERPL-MCNC: 0.76 MG/DL — SIGNIFICANT CHANGE UP (ref 0.5–1.3)
EGFR: 98 ML/MIN/1.73M2 — SIGNIFICANT CHANGE UP
EGFR: 98 ML/MIN/1.73M2 — SIGNIFICANT CHANGE UP
GLUCOSE SERPL-MCNC: 87 MG/DL — SIGNIFICANT CHANGE UP (ref 70–99)
GLUCOSE SERPL-MCNC: 87 MG/DL — SIGNIFICANT CHANGE UP (ref 70–99)
HCT VFR BLD CALC: 40.6 % — SIGNIFICANT CHANGE UP (ref 34.5–45)
HCT VFR BLD CALC: 40.6 % — SIGNIFICANT CHANGE UP (ref 34.5–45)
HGB BLD-MCNC: 13.5 G/DL — SIGNIFICANT CHANGE UP (ref 11.5–15.5)
HGB BLD-MCNC: 13.5 G/DL — SIGNIFICANT CHANGE UP (ref 11.5–15.5)
MAGNESIUM SERPL-MCNC: 1.9 MG/DL — SIGNIFICANT CHANGE UP (ref 1.6–2.6)
MAGNESIUM SERPL-MCNC: 1.9 MG/DL — SIGNIFICANT CHANGE UP (ref 1.6–2.6)
MCHC RBC-ENTMCNC: 30.4 PG — SIGNIFICANT CHANGE UP (ref 27–34)
MCHC RBC-ENTMCNC: 30.4 PG — SIGNIFICANT CHANGE UP (ref 27–34)
MCHC RBC-ENTMCNC: 33.3 GM/DL — SIGNIFICANT CHANGE UP (ref 32–36)
MCHC RBC-ENTMCNC: 33.3 GM/DL — SIGNIFICANT CHANGE UP (ref 32–36)
MCV RBC AUTO: 91.4 FL — SIGNIFICANT CHANGE UP (ref 80–100)
MCV RBC AUTO: 91.4 FL — SIGNIFICANT CHANGE UP (ref 80–100)
PHOSPHATE SERPL-MCNC: 3.8 MG/DL — SIGNIFICANT CHANGE UP (ref 2.4–4.7)
PHOSPHATE SERPL-MCNC: 3.8 MG/DL — SIGNIFICANT CHANGE UP (ref 2.4–4.7)
PLATELET # BLD AUTO: 255 K/UL — SIGNIFICANT CHANGE UP (ref 150–400)
PLATELET # BLD AUTO: 255 K/UL — SIGNIFICANT CHANGE UP (ref 150–400)
POTASSIUM SERPL-MCNC: 4.1 MMOL/L — SIGNIFICANT CHANGE UP (ref 3.5–5.3)
POTASSIUM SERPL-MCNC: 4.1 MMOL/L — SIGNIFICANT CHANGE UP (ref 3.5–5.3)
POTASSIUM SERPL-SCNC: 4.1 MMOL/L — SIGNIFICANT CHANGE UP (ref 3.5–5.3)
POTASSIUM SERPL-SCNC: 4.1 MMOL/L — SIGNIFICANT CHANGE UP (ref 3.5–5.3)
PROT SERPL-MCNC: 7.2 G/DL — SIGNIFICANT CHANGE UP (ref 6.6–8.7)
PROT SERPL-MCNC: 7.2 G/DL — SIGNIFICANT CHANGE UP (ref 6.6–8.7)
RBC # BLD: 4.44 M/UL — SIGNIFICANT CHANGE UP (ref 3.8–5.2)
RBC # BLD: 4.44 M/UL — SIGNIFICANT CHANGE UP (ref 3.8–5.2)
RBC # FLD: 14.4 % — SIGNIFICANT CHANGE UP (ref 10.3–14.5)
RBC # FLD: 14.4 % — SIGNIFICANT CHANGE UP (ref 10.3–14.5)
SODIUM SERPL-SCNC: 137 MMOL/L — SIGNIFICANT CHANGE UP (ref 135–145)
SODIUM SERPL-SCNC: 137 MMOL/L — SIGNIFICANT CHANGE UP (ref 135–145)
WBC # BLD: 5.93 K/UL — SIGNIFICANT CHANGE UP (ref 3.8–10.5)
WBC # BLD: 5.93 K/UL — SIGNIFICANT CHANGE UP (ref 3.8–10.5)
WBC # FLD AUTO: 5.93 K/UL — SIGNIFICANT CHANGE UP (ref 3.8–10.5)
WBC # FLD AUTO: 5.93 K/UL — SIGNIFICANT CHANGE UP (ref 3.8–10.5)

## 2023-10-28 PROCEDURE — 72158 MRI LUMBAR SPINE W/O & W/DYE: CPT | Mod: 26

## 2023-10-28 PROCEDURE — 73718 MRI LOWER EXTREMITY W/O DYE: CPT | Mod: 26,LT

## 2023-10-28 PROCEDURE — 99233 SBSQ HOSP IP/OBS HIGH 50: CPT | Mod: GC

## 2023-10-28 PROCEDURE — 99232 SBSQ HOSP IP/OBS MODERATE 35: CPT

## 2023-10-28 RX ORDER — LIDOCAINE 4 G/100G
1 CREAM TOPICAL DAILY
Refills: 0 | Status: DISCONTINUED | OUTPATIENT
Start: 2023-10-28 | End: 2023-10-29

## 2023-10-28 RX ORDER — CEFAZOLIN SODIUM 1 G
2000 VIAL (EA) INJECTION EVERY 8 HOURS
Refills: 0 | Status: DISCONTINUED | OUTPATIENT
Start: 2023-10-28 | End: 2023-11-01

## 2023-10-28 RX ADMIN — OXYCODONE HYDROCHLORIDE 15 MILLIGRAM(S): 5 TABLET ORAL at 14:22

## 2023-10-28 RX ADMIN — ENOXAPARIN SODIUM 40 MILLIGRAM(S): 100 INJECTION SUBCUTANEOUS at 05:29

## 2023-10-28 RX ADMIN — OXYCODONE HYDROCHLORIDE 15 MILLIGRAM(S): 5 TABLET ORAL at 22:39

## 2023-10-28 RX ADMIN — Medication 50 MILLIGRAM(S): at 05:30

## 2023-10-28 RX ADMIN — OXYCODONE HYDROCHLORIDE 15 MILLIGRAM(S): 5 TABLET ORAL at 09:00

## 2023-10-28 RX ADMIN — SERTRALINE 100 MILLIGRAM(S): 25 TABLET, FILM COATED ORAL at 13:22

## 2023-10-28 RX ADMIN — OXYCODONE HYDROCHLORIDE 15 MILLIGRAM(S): 5 TABLET ORAL at 02:18

## 2023-10-28 RX ADMIN — ONDANSETRON 4 MILLIGRAM(S): 8 TABLET, FILM COATED ORAL at 22:45

## 2023-10-28 RX ADMIN — Medication 100 MILLIGRAM(S): at 05:30

## 2023-10-28 RX ADMIN — OXYCODONE HYDROCHLORIDE 15 MILLIGRAM(S): 5 TABLET ORAL at 03:18

## 2023-10-28 RX ADMIN — OXYCODONE HYDROCHLORIDE 15 MILLIGRAM(S): 5 TABLET ORAL at 13:22

## 2023-10-28 RX ADMIN — PANTOPRAZOLE SODIUM 40 MILLIGRAM(S): 20 TABLET, DELAYED RELEASE ORAL at 05:30

## 2023-10-28 RX ADMIN — OXYCODONE HYDROCHLORIDE 15 MILLIGRAM(S): 5 TABLET ORAL at 08:01

## 2023-10-28 RX ADMIN — Medication 50 MILLIGRAM(S): at 18:03

## 2023-10-28 RX ADMIN — Medication 2000 MILLIGRAM(S): at 22:44

## 2023-10-28 RX ADMIN — Medication 166.67 MILLIGRAM(S): at 02:13

## 2023-10-28 RX ADMIN — OXYCODONE HYDROCHLORIDE 15 MILLIGRAM(S): 5 TABLET ORAL at 23:39

## 2023-10-28 RX ADMIN — Medication 30 MILLIGRAM(S): at 05:29

## 2023-10-28 RX ADMIN — Medication 2000 MILLIGRAM(S): at 13:21

## 2023-10-28 RX ADMIN — Medication 30 MILLIGRAM(S): at 18:03

## 2023-10-28 NOTE — PROGRESS NOTE ADULT - ASSESSMENT
48-year-old female with a complicated medical h/o L4/5 decompression done on January 13, 2019 at [facility]. At that time she had cauda equina syndrome and was admitted in February 2019 for wound infection from that surgery. She had MSSA growing from the incision site and had a I&D done on February 22, 2019. She was discharged on IV cefazolin which she completed in April 4, 2019. She followed up with us after that time. Stopped following up after April 21 2020. She recently was seen at [facility] emergency room for skin peeling. She was discharged from the emergency room after her labs and work-up was within normal limits. Patient is concerned for any infection since she had a severe infection in 2019 and is traumatized from that, so was seen in the office on 5/16/23 and had a negative work up. Patient was seen in the office on 10/23 with c/o chills and drainage from the left foot. Blood cultures and blood work was obtained in the office. Blood cultures with GPC. Patient was referred to the ER. In the Hospital patient has been afebrile, no leukocytosis. Started on Vancomycin and meropenem.       Positive blood cultures  Chills  Left foot drainage from wound    - Blood cultures in the office on 10/23 with CoNS  - Repeat blood cultures 10/25 no growth   - XR Foot with no acute findings   - MRI foot pending  - Podiatry eval noted   - Procalcitonin level in office < 0.03  - CRP 5  - ESR 40  - Start Cefazolin   - D/C Vancomycin  - TTE reporting no veg   - Follow up cultures  - Trend Fever  - Trend WBC      Will Follow    Discussed treatment plan with: Dr Keys

## 2023-10-28 NOTE — PROGRESS NOTE ADULT - ASSESSMENT
48F with PMHX Cauda Equina Syndrome, Chronic Back Pain, Hx DM2 (no longer on meds), HTN, MDD, Anxiety, Chronic Opiate Use, Tobacco Abuse admitted for GPC Bacteremia 2/2 Infected L Foot Wound.    #GPC Bacteremia, L foot wound  - ID following  - Podiatry following  - Repeat BCx w/ NGTD  -MRSA negative  - d/c Vancomycin and started cefazolin per ID  - TTE without vegetation  - Wound care per Podiatry, pending their f/u after MRI  - GERMAN unremarkable  - MRI L foot pending    Acute on Chronic Back Pain  - On Oxycodone IR 15mg q4h PRN  - PM following  - CT reviewed - Small to moderate left paracentral disc protrusion L5-S1 with mass effect on the left S1 nerve root. Associated soft tissue within the left side of the spinal canal may be related to the protruded/extruded disc or left-sided facet joint.  - added lidocaine patch 4% up to 3 patches at a time for reported additional pain and per pain management   - No signs of cauda equina symptoms, will monitor for symptoms with neuro checks.   - will order MRI w/wo contrast - if remarkable will consider spine consult    HTN  - Metoprolol Succinate 100mg q24  - Hydralazine 50mg BID    MDD, Anxiety  - Zoloft 100mg q24  - Buspar 30mg BID    GERD  - Nexium 40mg q24    Hx DM2  - A1C: 6  - Reportedly no longer diabetic/taking meds s/p weight loss    VTE PPX   -SCD/LMWH 40 q24    Diet: DASH/TLC

## 2023-10-28 NOTE — PROGRESS NOTE ADULT - SUBJECTIVE AND OBJECTIVE BOX
Patient is a 48y old  Female who presents with a chief complaint of GPC Bacteremia 2/2 infected L foot wound (27 Oct 2023 13:21)    BRIEF HOSPITAL COURSE:  48F with significant pmh of  Cauda Equina Syndrome, Chronic Back Pain, Hx DM2 (no longer on meds), HTN, MDD, Anxiety, Chronic Opiate Use, Tobacco Abuse admitted for positive blood cultures. BCX +GPC Clusters x2 speciation pending. Repeat BCX x2 obtained. Patient given Vanco 1g IVx 1 in ED. Infected L Foot wound +purulent drainage for past few weeks likely source of bacteremia. CT Lumbar spine with contrast showed disc protrusion w/ mass effect on S1 root. pending MR feet and lumbar    INTERVAL HPI/OVERNIGHT EVENTS:  No acute events overnight    TODAY: Patient would like something for her breakthrough pain so that she can have less pain at night.    MEDICATIONS  (STANDING):  busPIRone 30 milliGRAM(s) Oral two times a day  ceFAZolin  Injectable. 2000 milliGRAM(s) IV Push every 8 hours  enoxaparin Injectable 40 milliGRAM(s) SubCutaneous every 24 hours  hydrALAZINE 50 milliGRAM(s) Oral two times a day  influenza   Vaccine 0.5 milliLiter(s) IntraMuscular once  metoprolol succinate  milliGRAM(s) Oral daily  pantoprazole    Tablet 40 milliGRAM(s) Oral before breakfast  sertraline 100 milliGRAM(s) Oral daily    MEDICATIONS  (PRN):  acetaminophen     Tablet .. 650 milliGRAM(s) Oral every 6 hours PRN Temp greater or equal to 38C (100.4F), Mild Pain (1 - 3)  aluminum hydroxide/magnesium hydroxide/simethicone Suspension 30 milliLiter(s) Oral every 4 hours PRN Dyspepsia  lidocaine   4% Patch 1 Patch Transdermal daily PRN Back Pain  melatonin 3 milliGRAM(s) Oral at bedtime PRN Insomnia  morphine  - Injectable 2 milliGRAM(s) IV Push every 4 hours PRN Severe Pain (7 - 10)  ondansetron Injectable 4 milliGRAM(s) IV Push every 8 hours PRN Nausea and/or Vomiting  oxyCODONE    IR 15 milliGRAM(s) Oral every 4 hours PRN Severe Pain (7 - 10)    Allergies    penicillin (Unknown)  Milk (Unknown)  amoxicillin (Anaphylaxis)  citrus (Unknown)    Intolerances    REVIEW OF SYSTEMS:  CONSTITUTIONAL: No fever, weight loss, or fatigue  RESPIRATORY: No cough, wheezing, chills or hemoptysis; No shortness of breath  CARDIOVASCULAR: No chest pain, palpitations, dizziness,   GASTROINTESTINAL: No abdominal or epigastric pain. No nausea, vomiting,  NEUROLOGICAL: No headaches, memory loss, loss of strength, or tremors, numbness down her left leg  MUSCULOSKELETAL: No joint pain or swelling; + back pain,     Vital Signs Last 24 Hrs  T(C): 36.4 (28 Oct 2023 04:50), Max: 37 (27 Oct 2023 20:38)  T(F): 97.6 (28 Oct 2023 04:50), Max: 98.6 (27 Oct 2023 20:38)  HR: 70 (28 Oct 2023 04:50) (60 - 70)  BP: 130/84 (28 Oct 2023 04:50) (117/74 - 130/84)  BP(mean): --  RR: 18 (28 Oct 2023 04:50) (18 - 18)  SpO2: 97% (28 Oct 2023 04:50) (96% - 97%)    Parameters below as of 28 Oct 2023 04:50  Patient On (Oxygen Delivery Method): room air    PHYSICAL EXAM:  GENERAL:  A&Ox3, No acute distress, lying comfortably in bed  HEAD:  Atraumatic, Normocephalic  EYES: EOMI, PERRLA, conjunctiva and sclera clear  NERVOUS SYSTEM:  Alert & Oriented, No gross focal deficits,   CHEST/LUNG: Clear to auscultation bilaterally;  HEART: Regular rate and rhythm; No murmurs, rubs, or gallops  ABDOMEN: Soft, Nontender, Nondistended; Bowel sounds present  EXTREMITIES:  No clubbing, cyanosis, or edema, left leg weakness  SKIN: No rashes or lesions    LABS:                        13.5   5.93  )-----------( 255      ( 28 Oct 2023 07:05 )             40.6     10-28    137  |  98  |  6.0<L>  ----------------------------<  87  4.1   |  29.0  |  0.76    Ca    8.9      28 Oct 2023 07:05  Phos  3.8     10-28  Mg     1.9     10-28    TPro  7.2  /  Alb  3.8  /  TBili  0.3<L>  /  DBili  x   /  AST  18  /  ALT  6   /  AlkPhos  64  10-28    Urinalysis Basic - ( 28 Oct 2023 07:05 )    Color: x / Appearance: x / SG: x / pH: x  Gluc: 87 mg/dL / Ketone: x  / Bili: x / Urobili: x   Blood: x / Protein: x / Nitrite: x   Leuk Esterase: x / RBC: x / WBC x   Sq Epi: x / Non Sq Epi: x / Bacteria: x      CAPILLARY BLOOD GLUCOSE    RADIOLOGY & ADDITIONAL TESTS:  Pending MR Foot and Lumbar    Imaging Personally Reviewed:  [X] YES  [ ] NO  Consultant(s) Notes Reviewed:  [X] YES  [ ] NO  Care Discussed with Consultants/Other Providers [X] YES  [ ] NO  Plan of Care discussed with House Staff: [X]YES [ ] NO

## 2023-10-28 NOTE — PROGRESS NOTE ADULT - SUBJECTIVE AND OBJECTIVE BOX
[facility]  INFECTIOUS DISEASES at [place] / [place] / [place]  =======================================================                               Perry Carreno MD#   Zuleyka Mccabe MD*                             Lizzy Acosta MD*   Barbara Luna MD*                              Professor Emeritus:  Dr Bebeto Mccall MD^            Diplomates American Board of Internal Medicine & Infectious Diseases                # [place] Office - Appt - Tel  [phone] Fax [phone]                * [place] Office - Appt - Tel [phone] Fax [phone]                      ^[place] Office - Tel  [phone] Fax [phone]                                  Hospital Consult line:  [phone]  =======================================================    CLARENCE VITALE [id number]    Follow up: Positive blood cultures    no fevers       Allergies:  penicillin (Unknown)  Milk (Unknown)  amoxicillin (Anaphylaxis)  citrus (Unknown)        REVIEW OF SYSTEMS:  CONSTITUTIONAL:  No Fever. Occasional chills  HEENT:  No diplopia or blurred vision.  No earache, sore throat or runny nose.  CARDIOVASCULAR:  No chest pain  RESPIRATORY:  No cough, shortness of breath  GASTROINTESTINAL:  No nausea, vomiting or diarrhea.  GENITOURINARY:  No dysuria, frequency or urgency. No Blood in urine  MUSCULOSKELETAL:  no joint aches, no muscle pain  SKIN:  No change in skin, hair or nails.  NEUROLOGIC:  No Headaches      Physical Exam:  GEN: NAD  HEENT: normocephalic and atraumatic. EOMI. PERRL.    NECK: Supple.   LUNGS: CTA B/L.  HEART: RRR  ABDOMEN: Soft, NT, ND.  +BS.    : No CVA tenderness  EXTREMITIES: Without  edema.  MSK: No joint swelling  NEUROLOGIC: No Focal Deficits   PSYCHIATRIC: Appropriate affect .  SKIN: No rash      Vitals:  T(F): 97.6 (28 Oct 2023 04:50), Max: 98.6 (27 Oct 2023 09:00)  HR: 70 (28 Oct 2023 04:50)  BP: 130/84 (28 Oct 2023 04:50)  RR: 18 (28 Oct 2023 04:50)  SpO2: 97% (28 Oct 2023 04:50) (96% - 99%)  temp max in last 48H T(F): , Max: 98.6 (10-27-23 @ 09:00)    Current Antibiotics:  ceFAZolin  Injectable. 2000 milliGRAM(s) IV Push every 8 hours  vancomycin  IVPB 1250 milliGRAM(s) IV Intermittent every 12 hours    Other medications:  busPIRone 30 milliGRAM(s) Oral two times a day  enoxaparin Injectable 40 milliGRAM(s) SubCutaneous every 24 hours  hydrALAZINE 50 milliGRAM(s) Oral two times a day  influenza   Vaccine 0.5 milliLiter(s) IntraMuscular once  metoprolol succinate  milliGRAM(s) Oral daily  pantoprazole    Tablet 40 milliGRAM(s) Oral before breakfast  sertraline 100 milliGRAM(s) Oral daily                            13.5   5.93  )-----------( 255      ( 28 Oct 2023 07:05 )             40.6     10-27    135  |  98  |  6.4<L>  ----------------------------<  118<H>  4.0   |  26.0  |  0.70    Ca    8.9      27 Oct 2023 06:25    TPro  7.3  /  Alb  3.8  /  TBili  0.3<L>  /  DBili  x   /  AST  14  /  ALT  6   /  AlkPhos  67  10-27    RECENT CULTURES:  10-25 @ 00:20 .Blood Blood-Peripheral     No growth at 72 Hours    10-25 @ 00:15 .Blood Blood-Peripheral     No growth at 72 Hours      WBC Count: 5.93 K/uL (10-28-23 @ 07:05)  WBC Count: 6.95 K/uL (10-27-23 @ 06:25)  WBC Count: 5.38 K/uL (10-26-23 @ 06:16)  WBC Count: 5.86 K/uL (10-25-23 @ 10:28)  WBC Count: 6.67 K/uL (10-25-23 @ 00:20)    Creatinine: 0.70 mg/dL (10-27-23 @ 06:25)  Creatinine: 0.71 mg/dL (10-26-23 @ 06:16)  Creatinine: 0.73 mg/dL (10-25-23 @ 10:28)  Creatinine: 0.82 mg/dL (10-25-23 @ 00:20)    C-Reactive Protein, Serum: 18 mg/L (10-26-23 @ 16:29)  C-Reactive Protein, Serum: 5 mg/L (10-25-23 @ 00:20)    Sedimentation Rate, Erythrocyte: 45 mm/hr (10-26-23 @ 16:29)  Sedimentation Rate, Erythrocyte: 40 mm/hr (10-25-23 @ 00:20)    Vancomycin Level, Trough: 16.1 ug/mL (10-26-23 @ 11:46)        < from: TTE Echo Complete w/ Contrast w/ Doppler (10.25.23 @ 11:48) >  Summary:   1. Technically difficult study.   2. Mildly enlarged left atrium.   3. Normal wall motion. Left ventricular ejection fraction, by visual   estimation, is 55 to 60%.   4. Normal right atrial size.   5. Normal right ventricular size and function.   6. Mild pulmonic valve regurgitation.   7. There is no evidence of pericardial effusion.   8. No cardiac mass or vegetations visualized. However, TUCKER is a more   sensitive test to evaluate for these findings.    < end of copied text >

## 2023-10-29 PROCEDURE — 99232 SBSQ HOSP IP/OBS MODERATE 35: CPT

## 2023-10-29 PROCEDURE — 99233 SBSQ HOSP IP/OBS HIGH 50: CPT

## 2023-10-29 RX ORDER — LACTULOSE 10 G/15ML
10 SOLUTION ORAL
Refills: 0 | Status: DISCONTINUED | OUTPATIENT
Start: 2023-10-29 | End: 2023-10-29

## 2023-10-29 RX ORDER — ACETAMINOPHEN 500 MG
5750 TABLET ORAL EVERY 8 HOURS
Refills: 0 | Status: DISCONTINUED | OUTPATIENT
Start: 2023-10-29 | End: 2023-10-29

## 2023-10-29 RX ORDER — ACETAMINOPHEN 500 MG
750 TABLET ORAL EVERY 8 HOURS
Refills: 0 | Status: DISCONTINUED | OUTPATIENT
Start: 2023-10-29 | End: 2023-10-29

## 2023-10-29 RX ORDER — OXYCODONE HYDROCHLORIDE 5 MG/1
20 TABLET ORAL EVERY 4 HOURS
Refills: 0 | Status: DISCONTINUED | OUTPATIENT
Start: 2023-10-29 | End: 2023-11-01

## 2023-10-29 RX ORDER — MORPHINE SULFATE 50 MG/1
1 CAPSULE, EXTENDED RELEASE ORAL EVERY 4 HOURS
Refills: 0 | Status: DISCONTINUED | OUTPATIENT
Start: 2023-10-29 | End: 2023-11-01

## 2023-10-29 RX ORDER — ACETAMINOPHEN 500 MG
650 TABLET ORAL EVERY 8 HOURS
Refills: 0 | Status: DISCONTINUED | OUTPATIENT
Start: 2023-10-29 | End: 2023-11-01

## 2023-10-29 RX ORDER — POLYETHYLENE GLYCOL 3350 17 G/17G
17 POWDER, FOR SOLUTION ORAL DAILY
Refills: 0 | Status: DISCONTINUED | OUTPATIENT
Start: 2023-10-29 | End: 2023-11-01

## 2023-10-29 RX ORDER — SENNA PLUS 8.6 MG/1
2 TABLET ORAL AT BEDTIME
Refills: 0 | Status: DISCONTINUED | OUTPATIENT
Start: 2023-10-29 | End: 2023-11-01

## 2023-10-29 RX ADMIN — Medication 2000 MILLIGRAM(S): at 13:51

## 2023-10-29 RX ADMIN — ENOXAPARIN SODIUM 40 MILLIGRAM(S): 100 INJECTION SUBCUTANEOUS at 06:29

## 2023-10-29 RX ADMIN — Medication 30 MILLIGRAM(S): at 06:23

## 2023-10-29 RX ADMIN — OXYCODONE HYDROCHLORIDE 15 MILLIGRAM(S): 5 TABLET ORAL at 04:04

## 2023-10-29 RX ADMIN — OXYCODONE HYDROCHLORIDE 15 MILLIGRAM(S): 5 TABLET ORAL at 09:04

## 2023-10-29 RX ADMIN — Medication 50 MILLIGRAM(S): at 17:19

## 2023-10-29 RX ADMIN — Medication 50 MILLIGRAM(S): at 06:23

## 2023-10-29 RX ADMIN — ONDANSETRON 4 MILLIGRAM(S): 8 TABLET, FILM COATED ORAL at 21:19

## 2023-10-29 RX ADMIN — Medication 30 MILLIGRAM(S): at 17:18

## 2023-10-29 RX ADMIN — OXYCODONE HYDROCHLORIDE 15 MILLIGRAM(S): 5 TABLET ORAL at 13:43

## 2023-10-29 RX ADMIN — OXYCODONE HYDROCHLORIDE 15 MILLIGRAM(S): 5 TABLET ORAL at 12:46

## 2023-10-29 RX ADMIN — Medication 650 MILLIGRAM(S): at 22:16

## 2023-10-29 RX ADMIN — Medication 650 MILLIGRAM(S): at 21:16

## 2023-10-29 RX ADMIN — OXYCODONE HYDROCHLORIDE 15 MILLIGRAM(S): 5 TABLET ORAL at 08:04

## 2023-10-29 RX ADMIN — SERTRALINE 100 MILLIGRAM(S): 25 TABLET, FILM COATED ORAL at 14:06

## 2023-10-29 RX ADMIN — Medication 100 MILLIGRAM(S): at 06:24

## 2023-10-29 RX ADMIN — OXYCODONE HYDROCHLORIDE 20 MILLIGRAM(S): 5 TABLET ORAL at 23:13

## 2023-10-29 RX ADMIN — PANTOPRAZOLE SODIUM 40 MILLIGRAM(S): 20 TABLET, DELAYED RELEASE ORAL at 06:23

## 2023-10-29 RX ADMIN — OXYCODONE HYDROCHLORIDE 20 MILLIGRAM(S): 5 TABLET ORAL at 22:13

## 2023-10-29 RX ADMIN — OXYCODONE HYDROCHLORIDE 20 MILLIGRAM(S): 5 TABLET ORAL at 19:08

## 2023-10-29 RX ADMIN — Medication 2000 MILLIGRAM(S): at 06:22

## 2023-10-29 RX ADMIN — OXYCODONE HYDROCHLORIDE 15 MILLIGRAM(S): 5 TABLET ORAL at 03:04

## 2023-10-29 RX ADMIN — OXYCODONE HYDROCHLORIDE 20 MILLIGRAM(S): 5 TABLET ORAL at 18:08

## 2023-10-29 RX ADMIN — Medication 2000 MILLIGRAM(S): at 21:18

## 2023-10-29 RX ADMIN — ONDANSETRON 4 MILLIGRAM(S): 8 TABLET, FILM COATED ORAL at 06:22

## 2023-10-29 NOTE — PROGRESS NOTE ADULT - ASSESSMENT
48F with PMHX Cauda Equina Syndrome, Chronic Back Pain, Hx DM2 (no longer on meds), HTN, MDD, Anxiety, Chronic Opiate Use, Tobacco Abuse admitted for GPC Bacteremia 2/2 Infected L Foot Wound.    #Blood cultures in the office on 10/23 with CoNS- Staph hominis  #L foot wound  - Repeat BCx w/ NGTD form 10/25  - antibx changed to started cefazolin per ID  - Wound care per Podiatry  - GERMAN unremarkable  - pending Podiatry f/u after MRI- subcortical edema  - ID and Podiatry following    Acute on Chronic Back Pain- controlled  - On Oxycodone IR 15mg q4h PRN; lidocaine  - istop checked- was on 15mg Q 6 hrs. since c/o acute pain will maintain Q4 inhouse   - CT reviewed - Small to moderate left paracentral disc protrusion L5-S1 with mass effect on the left S1 nerve root. Associated soft tissue within the left side of the spinal canal may be related to the protruded/extruded disc or left-sided facet joint.  - No signs of cauda equina symptoms, will monitor for symptoms with neuro checks.   - MRi spine done- L5-S1 Moderate left paracentral disc extrusion with inferior migration as well as compression and displacement of the left S1 nerve root.   - PM following  - Will get PT eval    HTN controlled  - Metoprolol Succinate 100mg q24  - Hydralazine 50mg BID    MDD, Anxiety  - Zoloft 100mg q24  - Buspar 30mg BID    GERD  - Nexium 40mg q24    Hx DM2  - A1C: 6  - Reportedly no longer diabetic/ meds s/p weight loss    VTE PPX   -SCD/LMWH 40 q24  Dispo- will d/w PM on Monday if JEREMY is an option; pending PT eval; and antibx stop date; will need wound care for home     Total time spent in this encounter assessing, diagnosing, analyzing and medical decision making is>35 mins.     48F with PMHX Cauda Equina Syndrome, Chronic Back Pain, Hx DM2 (no longer on meds), HTN, MDD, Anxiety, Chronic Opiate Use, Tobacco Abuse admitted for GPC Bacteremia 2/2 Infected L Foot Wound.    #Blood cultures in the office on 10/23 with CoNS- Staph hominis  #L foot wound  - Repeat BCx w/ NGTD form 10/25  - antibx changed to started cefazolin per ID  - Wound care per Podiatry  - GERMAN unremarkable  - pending Podiatry f/u after MRI- subcortical edema  - ID and Podiatry following    Acute on Chronic Back Pain- controlled  - On Oxycodone IR 15mg q4h PRN; lidocaine  - istop checked- was on 15mg Q 6 hrs. since c/o acute pain will maintain Q4 inhouse   - after much d/w pt, increasing oxy to 20 Q 4 and making tylenol standing. she states that she consulted with her own outside doctors who recommended not to use lidocaine so she is refusing lidocaine  - CT reviewed - Small to moderate left paracentral disc protrusion L5-S1 with mass effect on the left S1 nerve root. Associated soft tissue within the left side of the spinal canal may be related to the protruded/extruded disc or left-sided facet joint.  - No signs of cauda equina symptoms, will monitor for symptoms with neuro checks.   - MRi spine done- L5-S1 Moderate left paracentral disc extrusion with inferior migration as well as compression and displacement of the left S1 nerve root.   - PM following    HTN controlled  - Metoprolol Succinate 100mg q24  - Hydralazine 50mg BID    MDD, Anxiety  - Zoloft 100mg q24  - Buspar 30mg BID    GERD  - Nexium 40mg q24    Hx DM2  - A1C: 6  - Reportedly no longer diabetic/ meds s/p weight loss    VTE PPX   -SCD/LMWH 40 q24  Dispo- will d/w ID antibx stop date; will need wound care for home     Total time spent in this encounter assessing, diagnosing, analyzing and medical decision making is>35 mins.

## 2023-10-29 NOTE — PROGRESS NOTE ADULT - SUBJECTIVE AND OBJECTIVE BOX
[facility]  INFECTIOUS DISEASES at [place] / [place] / [place]  =======================================================                               Perry Carreno MD#   Zuleyka Mccabe MD*                             Lizzy Acosta MD*   Barbara Luna MD*                              Professor Emeritus:  Dr Bebeto Mccall MD^            Diplomates American Board of Internal Medicine & Infectious Diseases                # [place] Office - Appt - Tel  [phone] Fax [phone]                * [place] Office - Appt - Tel [phone] Fax [phone]                      ^[place] Office - Tel  [phone] Fax [phone]                                  Hospital Consult line:  [phone]  =======================================================    CLARENCE VITALE [id number]    Follow up: Positive blood cultures    no fevers       Allergies:  penicillin (Unknown)  Milk (Unknown)  amoxicillin (Anaphylaxis)  citrus (Unknown)      REVIEW OF SYSTEMS:  CONSTITUTIONAL:  No Fever or chills  HEENT:  No diplopia or blurred vision.  No earache, sore throat or runny nose.  CARDIOVASCULAR:  No chest pain  RESPIRATORY:  No cough, shortness of breath  GASTROINTESTINAL:  No nausea, vomiting or diarrhea.  GENITOURINARY:  No dysuria, frequency or urgency. No Blood in urine  MUSCULOSKELETAL:  no joint aches, no muscle pain  SKIN:  No change in skin, hair or nails.  NEUROLOGIC:  No Headaches      Physical Exam:  GEN: NAD  HEENT: normocephalic and atraumatic. EOMI. PERRL.    NECK: Supple.   LUNGS: CTA B/L.  HEART: RRR  ABDOMEN: Soft, NT, ND.  +BS.    : No CVA tenderness  EXTREMITIES: Without  edema.  MSK: No joint swelling  NEUROLOGIC: No Focal Deficits   PSYCHIATRIC: Appropriate affect .  SKIN: No rash      Vitals:  T(F): 98.1 (29 Oct 2023 08:15), Max: 98.8 (28 Oct 2023 16:52)  HR: 66 (29 Oct 2023 08:15)  BP: 149/84 (29 Oct 2023 08:15)  RR: 18 (29 Oct 2023 08:15)  SpO2: 97% (29 Oct 2023 08:15) (97% - 98%)  temp max in last 48H T(F): , Max: 98.8 (10-28-23 @ 16:52)    Current Antibiotics:  ceFAZolin  Injectable. 2000 milliGRAM(s) IV Push every 8 hours    Other medications:  busPIRone 30 milliGRAM(s) Oral two times a day  enoxaparin Injectable 40 milliGRAM(s) SubCutaneous every 24 hours  hydrALAZINE 50 milliGRAM(s) Oral two times a day  influenza   Vaccine 0.5 milliLiter(s) IntraMuscular once  metoprolol succinate  milliGRAM(s) Oral daily  pantoprazole    Tablet 40 milliGRAM(s) Oral before breakfast  polyethylene glycol 3350 17 Gram(s) Oral daily  senna 2 Tablet(s) Oral at bedtime  sertraline 100 milliGRAM(s) Oral daily                            13.5   5.93  )-----------( 255      ( 28 Oct 2023 07:05 )             40.6     10-28    137  |  98  |  6.0<L>  ----------------------------<  87  4.1   |  29.0  |  0.76    Ca    8.9      28 Oct 2023 07:05  Phos  3.8     10-28  Mg     1.9     10-28    TPro  7.2  /  Alb  3.8  /  TBili  0.3<L>  /  DBili  x   /  AST  18  /  ALT  6   /  AlkPhos  64  10-28    RECENT CULTURES:  10-25 @ 00:20 .Blood Blood-Peripheral     No growth at 4 days    10-25 @ 00:15 .Blood Blood-Peripheral     No growth at 4 days      WBC Count: 5.93 K/uL (10-28-23 @ 07:05)  WBC Count: 6.95 K/uL (10-27-23 @ 06:25)  WBC Count: 5.38 K/uL (10-26-23 @ 06:16)  WBC Count: 5.86 K/uL (10-25-23 @ 10:28)  WBC Count: 6.67 K/uL (10-25-23 @ 00:20)    Creatinine: 0.76 mg/dL (10-28-23 @ 07:05)  Creatinine: 0.70 mg/dL (10-27-23 @ 06:25)  Creatinine: 0.71 mg/dL (10-26-23 @ 06:16)  Creatinine: 0.73 mg/dL (10-25-23 @ 10:28)  Creatinine: 0.82 mg/dL (10-25-23 @ 00:20)    C-Reactive Protein, Serum: 18 mg/L (10-26-23 @ 16:29)  C-Reactive Protein, Serum: 5 mg/L (10-25-23 @ 00:20)    Sedimentation Rate, Erythrocyte: 45 mm/hr (10-26-23 @ 16:29)  Sedimentation Rate, Erythrocyte: 40 mm/hr (10-25-23 @ 00:20)    Vancomycin Level, Trough: 16.1 ug/mL (10-26-23 @ 11:46)          < from: MR Foot No Cont, Left (10.28.23 @ 12:35) >  ACC: [id number] EXAM:  MR FOOT LT   ORDERED BY: RALPH MAYFIELD     PROCEDURE DATE:  10/28/2023          INTERPRETATION:  MR FOOT LEFT    HISTORY: Concern for osteomyelitis.    TECHNIQUE:  Multiplanar MRI of the left forefoot was performed without   contrast.    COMPARISON:  Left foot x-rays dated 10/24/2023.    FINDINGS:    OSSEOUS STRUCTURES    Fractures/Stress Reactions: Nondisplaced transverse fracture involves   the 1st proximal phalanx base..    Marrow:  Soft tissue wound appears to be present along the medial   margin of the 5th metatarsal phalangeal joint with slight subcortical   edema of the metatarsal head but no confluent T1 marrow replacement.  Faint presumed reactive or stress-related marrow edema is noted involving   the partially imaged talar head and navicular.    INTERPHALANGEAL JOINTS    Articular Surfaces:  Preserved.    Effusions/Synovitis:  None.    1ST METATARSOPHALANGEAL JOINT    Articular Surfaces:  Preserved.    Effusions/Synovitis:  None.    Sesamoids:  Intact.    LESSER METATARSOPHALANGEAL JOINTS    Articular Surfaces:  Preserved.    Effusions/Synovitis:  None.    TARSOMETATARSAL JOINTS    Articular Surfaces:  Preserved.    Effusions/Synovitis:  None.    INTERTARSAL JOINTS    Articular Surfaces:  Preserved.    Effusions/Synovitis:  None.    INTERMETATARSAL SPACES    Neuromas:  None.    Bursa:  Slight 1st through 3rd webspace bursa are noted.    LISFRANC LIGAMENT  Intact.    TENDONS    Flexor Tendons:  Intact.    Extensor Tendons:  Intact.    DISTAL PLANTAR FASCIA  Intact.    SOFT TISSUES    Musculature:  There is slight edema of the abductor hallucis muscle and   slight edema extending involving the plantar margin of the abductor   digiti minimi muscle. Changes may be traumatic or neuropathologic    Subcutaneous Tissues:  Mild to moderate scattered subcutis edema is   present. Soft tissue wound appears to be present along the lateral margin   of the 5th metatarsophalangeal joint. No abscess or gas is seen.    IMPRESSION:  1.  Soft tissue wound appearsto be present along the lateral margin of   the 5th metatarsophalangeal joint with slight subcortical edema of the   5th metatarsal head but no T1 marrow replacement. Changes are likely   reactive although early infection cannot be absolutely excluded.  2.  Nondisplaced fracture involves the 1st proximal phalanx base.    < end of copied text >          < from: TTE Echo Complete w/ Contrast w/ Doppler (10.25.23 @ 11:48) >  Summary:   1. Technically difficult study.   2. Mildly enlarged left atrium.   3. Normal wall motion. Left ventricular ejection fraction, by visual   estimation, is 55 to 60%.   4. Normal right atrial size.   5. Normal right ventricular size and function.   6. Mild pulmonic valve regurgitation.   7. There is no evidence of pericardial effusion.   8. No cardiac mass or vegetations visualized. However, TUCKER is a more   sensitive test to evaluate for these findings.    < end of copied text >

## 2023-10-29 NOTE — PROGRESS NOTE ADULT - SUBJECTIVE AND OBJECTIVE BOX
HEALTH ISSUES - PROBLEM Dx:    Bacteremia  left foot wound    INTERVAL HPI/ OVERNIGHT EVENTS:    seen ambulating in the room and hallway  however c/o pain, more so at nights and doesnt want PRN as she doesnt want to wake up toa sk for pain meds  denies contipation, n, v  deneis any new change in extremities strength or bowle bladder control  REVIEW OF SYSTEMS:    as above    Vital Signs Last 24 Hrs  T(C): 37.1 (29 Oct 2023 04:33), Max: 37.1 (28 Oct 2023 16:52)  T(F): 98.8 (29 Oct 2023 04:33), Max: 98.8 (28 Oct 2023 16:52)  HR: 66 (29 Oct 2023 04:33) (66 - 70)  BP: 141/94 (29 Oct 2023 04:33) (116/69 - 141/94)  BP(mean): --  RR: 18 (29 Oct 2023 04:33) (18 - 18)  SpO2: 97% (29 Oct 2023 04:33) (97% - 98%)    Parameters below as of 29 Oct 2023 04:33  Patient On (Oxygen Delivery Method): room air    PHYSICAL EXAM-  GENERAL: over weight, comfortable, not in distress  HEAD:  Atraumatic, Normocephalic  EYES: EOMI, PERRLA, conjunctiva and sclera clear  ENT:  Moist mucous membranes, No lesions  NECK: Supple, No JVD, Normal thyroid  NERVOUS SYSTEM:  Alert & Oriented X 3, Motor Strength 5/5 B/L upper and lower extremities  CHEST/LUNG: CTA bilaterally; No rales, rhonchi, wheezing, or rubs  HEART: Regular rate and rhythm; No murmurs, rubs, or gallops  ABDOMEN: Soft, Nontender, Nondistended; Bowel sounds present  EXTREMITIES:  2+ Peripheral Pulses, No clubbing, cyanosis, or edema  SKIN: No rashes or lesions    MEDICATIONS  (STANDING):  busPIRone 30 milliGRAM(s) Oral two times a day  ceFAZolin  Injectable. 2000 milliGRAM(s) IV Push every 8 hours  enoxaparin Injectable 40 milliGRAM(s) SubCutaneous every 24 hours  hydrALAZINE 50 milliGRAM(s) Oral two times a day  influenza   Vaccine 0.5 milliLiter(s) IntraMuscular once  metoprolol succinate  milliGRAM(s) Oral daily  pantoprazole    Tablet 40 milliGRAM(s) Oral before breakfast  sertraline 100 milliGRAM(s) Oral daily    MEDICATIONS  (PRN):  acetaminophen     Tablet .. 650 milliGRAM(s) Oral every 6 hours PRN Temp greater or equal to 38C (100.4F), Mild Pain (1 - 3)  aluminum hydroxide/magnesium hydroxide/simethicone Suspension 30 milliLiter(s) Oral every 4 hours PRN Dyspepsia  lidocaine   4% Patch 1 Patch Transdermal daily PRN Back Pain  melatonin 3 milliGRAM(s) Oral at bedtime PRN Insomnia  morphine  - Injectable 2 milliGRAM(s) IV Push every 4 hours PRN Severe Pain (7 - 10)  ondansetron Injectable 4 milliGRAM(s) IV Push every 8 hours PRN Nausea and/or Vomiting  oxyCODONE    IR 15 milliGRAM(s) Oral every 4 hours PRN Severe Pain (7 - 10)      LABS:                        13.5   5.93  )-----------( 255      ( 28 Oct 2023 07:05 )             40.6     10-28    137  |  98  |  6.0<L>  ----------------------------<  87  4.1   |  29.0  |  0.76    Ca    8.9      28 Oct 2023 07:05  Phos  3.8     10-28  Mg     1.9     10-28    TPro  7.2  /  Alb  3.8  /  TBili  0.3<L>  /  DBili  x   /  AST  18  /  ALT  6   /  AlkPhos  64  10-28      Urinalysis Basic - ( 28 Oct 2023 07:05 )    Color: x / Appearance: x / SG: x / pH: x  Gluc: 87 mg/dL / Ketone: x  / Bili: x / Urobili: x   Blood: x / Protein: x / Nitrite: x   Leuk Esterase: x / RBC: x / WBC x   Sq Epi: x / Non Sq Epi: x / Bacteria: x     HEALTH ISSUES - PROBLEM Dx:    Bacteremia  left foot wound    INTERVAL HPI/ OVERNIGHT EVENTS:    seen ambulating in the room and hallway  however c/o pain, more so at nights and doesn't want PRN as she doesn't want to wake up to ask for pain meds  denies constipation, n, v  denies any new change in extremities strength or bowel bladder control  admits to chronic bowel incontinence and bladder incontinence and left lower limb mild numbness like ' going to sleep'  is in argument with why she needs something more in meds as she is in the hospital    REVIEW OF SYSTEMS:    as above    Vital Signs Last 24 Hrs  T(C): 37.1 (29 Oct 2023 04:33), Max: 37.1 (28 Oct 2023 16:52)  T(F): 98.8 (29 Oct 2023 04:33), Max: 98.8 (28 Oct 2023 16:52)  HR: 66 (29 Oct 2023 04:33) (66 - 70)  BP: 141/94 (29 Oct 2023 04:33) (116/69 - 141/94)  BP(mean): --  RR: 18 (29 Oct 2023 04:33) (18 - 18)  SpO2: 97% (29 Oct 2023 04:33) (97% - 98%)    Parameters below as of 29 Oct 2023 04:33  Patient On (Oxygen Delivery Method): room air    PHYSICAL EXAM-  GENERAL: comfortable, not in distress  HEAD:  Atraumatic, Normocephalic  EYES: EOMI, PERRLA, conjunctiva and sclera clear  ENT:  Moist mucous membranes, No lesions  NECK: Supple, No JVD, Normal thyroid  NERVOUS SYSTEM:  Alert & Oriented X 3, Motor Strength 5/5 B/L upper and lower extremities  CHEST/LUNG: CTA bilaterally; No rales, rhonchi, wheezing, or rubs  HEART: Regular rate and rhythm; No murmurs, rubs, or gallops  ABDOMEN: Soft, Nontender, Nondistended; Bowel sounds present  EXTREMITIES:  2+ Peripheral Pulses, No clubbing, cyanosis, or edema  SKIN: No rashes or lesions    MEDICATIONS  (STANDING):  busPIRone 30 milliGRAM(s) Oral two times a day  ceFAZolin  Injectable. 2000 milliGRAM(s) IV Push every 8 hours  enoxaparin Injectable 40 milliGRAM(s) SubCutaneous every 24 hours  hydrALAZINE 50 milliGRAM(s) Oral two times a day  influenza   Vaccine 0.5 milliLiter(s) IntraMuscular once  metoprolol succinate  milliGRAM(s) Oral daily  pantoprazole    Tablet 40 milliGRAM(s) Oral before breakfast  sertraline 100 milliGRAM(s) Oral daily    MEDICATIONS  (PRN):  acetaminophen     Tablet .. 650 milliGRAM(s) Oral every 6 hours PRN Temp greater or equal to 38C (100.4F), Mild Pain (1 - 3)  aluminum hydroxide/magnesium hydroxide/simethicone Suspension 30 milliLiter(s) Oral every 4 hours PRN Dyspepsia  lidocaine   4% Patch 1 Patch Transdermal daily PRN Back Pain  melatonin 3 milliGRAM(s) Oral at bedtime PRN Insomnia  morphine  - Injectable 2 milliGRAM(s) IV Push every 4 hours PRN Severe Pain (7 - 10)  ondansetron Injectable 4 milliGRAM(s) IV Push every 8 hours PRN Nausea and/or Vomiting  oxyCODONE    IR 15 milliGRAM(s) Oral every 4 hours PRN Severe Pain (7 - 10)      LABS:                        13.5   5.93  )-----------( 255      ( 28 Oct 2023 07:05 )             40.6     10-28    137  |  98  |  6.0<L>  ----------------------------<  87  4.1   |  29.0  |  0.76    Ca    8.9      28 Oct 2023 07:05  Phos  3.8     10-28  Mg     1.9     10-28    TPro  7.2  /  Alb  3.8  /  TBili  0.3<L>  /  DBili  x   /  AST  18  /  ALT  6   /  AlkPhos  64  10-28      Urinalysis Basic - ( 28 Oct 2023 07:05 )    Color: x / Appearance: x / SG: x / pH: x  Gluc: 87 mg/dL / Ketone: x  / Bili: x / Urobili: x   Blood: x / Protein: x / Nitrite: x   Leuk Esterase: x / RBC: x / WBC x   Sq Epi: x / Non Sq Epi: x / Bacteria: x

## 2023-10-29 NOTE — PROGRESS NOTE ADULT - ASSESSMENT
48-year-old female with a complicated medical h/o L4/5 decompression done on January 13, 2019 at [facility]. At that time she had cauda equina syndrome and was admitted in February 2019 for wound infection from that surgery. She had MSSA growing from the incision site and had a I&D done on February 22, 2019. She was discharged on IV cefazolin which she completed in April 4, 2019. She followed up with us after that time. Stopped following up after April 21 2020. She recently was seen at [facility] emergency room for skin peeling. She was discharged from the emergency room after her labs and work-up was within normal limits. Patient is concerned for any infection since she had a severe infection in 2019 and is traumatized from that, so was seen in the office on 5/16/23 and had a negative work up. Patient was seen in the office on 10/23 with c/o chills and drainage from the left foot. Blood cultures and blood work was obtained in the office. Blood cultures with GPC. Patient was referred to the ER. In the Hospital patient has been afebrile, no leukocytosis. Started on Vancomycin and meropenem.       Positive blood cultures  Chills  Left foot drainage from wound    - Blood cultures in the office on 10/23 with CoNS  - Repeat blood cultures 10/25 no growth   - XR Foot with no acute findings   - MRI foot, left with no OM  - Podiatry eval noted   - Procalcitonin level in office < 0.03  - CRP 5  - ESR 40  - Continue Cefazolin   - TTE reporting no veg   - Follow up cultures  - Trend Fever  - Trend WBC      Will Follow    Discussed treatment plan with: Dr Keys

## 2023-10-30 ENCOUNTER — TRANSCRIPTION ENCOUNTER (OUTPATIENT)
Age: 48
End: 2023-10-30

## 2023-10-30 LAB
CRP SERPL-MCNC: 11 MG/L — HIGH
CRP SERPL-MCNC: 11 MG/L — HIGH
CULTURE RESULTS: SIGNIFICANT CHANGE UP
SPECIMEN SOURCE: SIGNIFICANT CHANGE UP

## 2023-10-30 PROCEDURE — 99232 SBSQ HOSP IP/OBS MODERATE 35: CPT | Mod: GC

## 2023-10-30 PROCEDURE — 99232 SBSQ HOSP IP/OBS MODERATE 35: CPT

## 2023-10-30 RX ORDER — SENNA PLUS 8.6 MG/1
2 TABLET ORAL
Qty: 0 | Refills: 0 | DISCHARGE
Start: 2023-10-30

## 2023-10-30 RX ORDER — OXYCODONE HYDROCHLORIDE 5 MG/1
3 TABLET ORAL
Qty: 0 | Refills: 0 | DISCHARGE
Start: 2023-10-30

## 2023-10-30 RX ORDER — CEFAZOLIN SODIUM 1 G
2 VIAL (EA) INJECTION
Qty: 0 | Refills: 0 | DISCHARGE
Start: 2023-10-30

## 2023-10-30 RX ORDER — POLYETHYLENE GLYCOL 3350 17 G/17G
17 POWDER, FOR SOLUTION ORAL
Qty: 0 | Refills: 0 | DISCHARGE
Start: 2023-10-30

## 2023-10-30 RX ADMIN — Medication 2000 MILLIGRAM(S): at 21:12

## 2023-10-30 RX ADMIN — Medication 650 MILLIGRAM(S): at 14:39

## 2023-10-30 RX ADMIN — Medication 2000 MILLIGRAM(S): at 06:22

## 2023-10-30 RX ADMIN — OXYCODONE HYDROCHLORIDE 20 MILLIGRAM(S): 5 TABLET ORAL at 15:39

## 2023-10-30 RX ADMIN — Medication 650 MILLIGRAM(S): at 05:05

## 2023-10-30 RX ADMIN — PANTOPRAZOLE SODIUM 40 MILLIGRAM(S): 20 TABLET, DELAYED RELEASE ORAL at 05:05

## 2023-10-30 RX ADMIN — Medication 2000 MILLIGRAM(S): at 14:38

## 2023-10-30 RX ADMIN — Medication 650 MILLIGRAM(S): at 15:39

## 2023-10-30 RX ADMIN — Medication 50 MILLIGRAM(S): at 05:05

## 2023-10-30 RX ADMIN — Medication 650 MILLIGRAM(S): at 21:12

## 2023-10-30 RX ADMIN — Medication 30 MILLIGRAM(S): at 17:06

## 2023-10-30 RX ADMIN — OXYCODONE HYDROCHLORIDE 20 MILLIGRAM(S): 5 TABLET ORAL at 03:14

## 2023-10-30 RX ADMIN — OXYCODONE HYDROCHLORIDE 20 MILLIGRAM(S): 5 TABLET ORAL at 11:30

## 2023-10-30 RX ADMIN — OXYCODONE HYDROCHLORIDE 20 MILLIGRAM(S): 5 TABLET ORAL at 23:30

## 2023-10-30 RX ADMIN — ONDANSETRON 4 MILLIGRAM(S): 8 TABLET, FILM COATED ORAL at 14:38

## 2023-10-30 RX ADMIN — MORPHINE SULFATE 1 MILLIGRAM(S): 50 CAPSULE, EXTENDED RELEASE ORAL at 22:10

## 2023-10-30 RX ADMIN — OXYCODONE HYDROCHLORIDE 20 MILLIGRAM(S): 5 TABLET ORAL at 06:22

## 2023-10-30 RX ADMIN — OXYCODONE HYDROCHLORIDE 20 MILLIGRAM(S): 5 TABLET ORAL at 22:52

## 2023-10-30 RX ADMIN — Medication 650 MILLIGRAM(S): at 06:05

## 2023-10-30 RX ADMIN — OXYCODONE HYDROCHLORIDE 20 MILLIGRAM(S): 5 TABLET ORAL at 14:39

## 2023-10-30 RX ADMIN — Medication 650 MILLIGRAM(S): at 21:47

## 2023-10-30 RX ADMIN — Medication 100 MILLIGRAM(S): at 05:05

## 2023-10-30 RX ADMIN — SERTRALINE 100 MILLIGRAM(S): 25 TABLET, FILM COATED ORAL at 10:31

## 2023-10-30 RX ADMIN — MORPHINE SULFATE 1 MILLIGRAM(S): 50 CAPSULE, EXTENDED RELEASE ORAL at 20:41

## 2023-10-30 RX ADMIN — ENOXAPARIN SODIUM 40 MILLIGRAM(S): 100 INJECTION SUBCUTANEOUS at 05:09

## 2023-10-30 RX ADMIN — OXYCODONE HYDROCHLORIDE 20 MILLIGRAM(S): 5 TABLET ORAL at 10:31

## 2023-10-30 RX ADMIN — OXYCODONE HYDROCHLORIDE 20 MILLIGRAM(S): 5 TABLET ORAL at 18:42

## 2023-10-30 RX ADMIN — MORPHINE SULFATE 1 MILLIGRAM(S): 50 CAPSULE, EXTENDED RELEASE ORAL at 08:42

## 2023-10-30 RX ADMIN — MORPHINE SULFATE 1 MILLIGRAM(S): 50 CAPSULE, EXTENDED RELEASE ORAL at 12:56

## 2023-10-30 RX ADMIN — OXYCODONE HYDROCHLORIDE 20 MILLIGRAM(S): 5 TABLET ORAL at 07:08

## 2023-10-30 RX ADMIN — ONDANSETRON 4 MILLIGRAM(S): 8 TABLET, FILM COATED ORAL at 06:22

## 2023-10-30 RX ADMIN — MORPHINE SULFATE 1 MILLIGRAM(S): 50 CAPSULE, EXTENDED RELEASE ORAL at 12:42

## 2023-10-30 RX ADMIN — POLYETHYLENE GLYCOL 3350 17 GRAM(S): 17 POWDER, FOR SOLUTION ORAL at 10:32

## 2023-10-30 RX ADMIN — Medication 50 MILLIGRAM(S): at 17:05

## 2023-10-30 RX ADMIN — MORPHINE SULFATE 1 MILLIGRAM(S): 50 CAPSULE, EXTENDED RELEASE ORAL at 08:27

## 2023-10-30 RX ADMIN — Medication 30 MILLIGRAM(S): at 05:05

## 2023-10-30 RX ADMIN — OXYCODONE HYDROCHLORIDE 20 MILLIGRAM(S): 5 TABLET ORAL at 19:30

## 2023-10-30 RX ADMIN — OXYCODONE HYDROCHLORIDE 20 MILLIGRAM(S): 5 TABLET ORAL at 02:14

## 2023-10-30 NOTE — PROGRESS NOTE ADULT - ASSESSMENT
A:  Left foot ulceration    P:   Pt seen and evaluated  Imaging reviewed   Dressing removed, wound noted to be fully closed   Applied ACE wrap   Reapplied the AFO brace  Podiatry to sign off, reconsult as needed  Pt wishes to follow up with her outside podiatrist in [place] upon discharge   D/w attending   A:  Left foot ulceration    P:   Pt seen and evaluated  Imaging reviewed   Dressing removed, wound noted to be fully closed   Applied ACE wrap   Reapplied the AFO brace  Podiatry to sign off, reconsult as needed  Pt wishes to follow up with her outside podiatrist in [place] upon discharge   Seen and evaluated with attending Dr. Kim

## 2023-10-30 NOTE — PROGRESS NOTE ADULT - SUBJECTIVE AND OBJECTIVE BOX
[facility]  INFECTIOUS DISEASES at [place] / [place] / [facility]  =======================================================                               Perry Carreno MD#   Zuleyka Mccabe MD*                             Lizzy Acosta MD*   Barbara Luna MD*                              Professor Emeritus:  Dr Bebeto Mccall MD^            Diplomates American Board of Internal Medicine & Infectious Diseases                # [place] Office - Appt - Tel  [phone] Fax [phone]                * [place] Office - Appt - Tel [phone] Fax [phone]                      ^[place] Office - Tel  [phone] Fax [phone]                                  Hospital Consult line:  [phone]  =======================================================    CLARENCE VITALE [id number]    Follow up: Positive blood cultures    no fevers       Allergies:  penicillin (Unknown)  Milk (Unknown)  amoxicillin (Anaphylaxis)  citrus (Unknown)      REVIEW OF SYSTEMS:  CONSTITUTIONAL:  No Fever or chills  HEENT:  No diplopia or blurred vision.  No earache, sore throat or runny nose.  CARDIOVASCULAR:  No chest pain  RESPIRATORY:  No cough, shortness of breath  GASTROINTESTINAL:  No nausea, vomiting or diarrhea.  GENITOURINARY:  No dysuria, frequency or urgency. No Blood in urine  MUSCULOSKELETAL:  no joint aches, no muscle pain  SKIN:  No change in skin, hair or nails.  NEUROLOGIC:  No Headaches      Physical Exam:  GEN: NAD  HEENT: normocephalic and atraumatic. EOMI. PERRL.    NECK: Supple.   LUNGS: CTA B/L.  HEART: RRR  ABDOMEN: Soft, NT, ND.  +BS.    : No CVA tenderness  EXTREMITIES: Without  edema.  MSK: No joint swelling  NEUROLOGIC: No Focal Deficits   PSYCHIATRIC: Appropriate affect .  SKIN: No rash      Vitals:  T(F): 98.5 (30 Oct 2023 08:25), Max: 98.5 (29 Oct 2023 16:45)  HR: 63 (30 Oct 2023 08:25)  BP: 136/83 (30 Oct 2023 08:25)  RR: 18 (30 Oct 2023 08:25)  SpO2: 97% (30 Oct 2023 08:25) (94% - 100%)  temp max in last 48H T(F): , Max: 98.8 (10-28-23 @ 16:52)    Current Antibiotics:  ceFAZolin  Injectable. 2000 milliGRAM(s) IV Push every 8 hours    Other medications:  acetaminophen     Tablet .. 650 milliGRAM(s) Oral every 8 hours  busPIRone 30 milliGRAM(s) Oral two times a day  enoxaparin Injectable 40 milliGRAM(s) SubCutaneous every 24 hours  hydrALAZINE 50 milliGRAM(s) Oral two times a day  influenza   Vaccine 0.5 milliLiter(s) IntraMuscular once  metoprolol succinate  milliGRAM(s) Oral daily  pantoprazole    Tablet 40 milliGRAM(s) Oral before breakfast  polyethylene glycol 3350 17 Gram(s) Oral daily  senna 2 Tablet(s) Oral at bedtime  sertraline 100 milliGRAM(s) Oral daily      RECENT CULTURES:  10-25 @ 00:20 .Blood Blood-Peripheral     No growth at 5 days    10-25 @ 00:15 .Blood Blood-Peripheral     No growth at 5 days      WBC Count: 5.93 K/uL (10-28-23 @ 07:05)  WBC Count: 6.95 K/uL (10-27-23 @ 06:25)  WBC Count: 5.38 K/uL (10-26-23 @ 06:16)    Creatinine: 0.76 mg/dL (10-28-23 @ 07:05)  Creatinine: 0.70 mg/dL (10-27-23 @ 06:25)  Creatinine: 0.71 mg/dL (10-26-23 @ 06:16)    C-Reactive Protein, Serum: 11 mg/L (10-30-23 @ 06:15)  C-Reactive Protein, Serum: 18 mg/L (10-26-23 @ 16:29)  C-Reactive Protein, Serum: 5 mg/L (10-25-23 @ 00:20)    Sedimentation Rate, Erythrocyte: 45 mm/hr (10-26-23 @ 16:29)  Sedimentation Rate, Erythrocyte: 40 mm/hr (10-25-23 @ 00:20)        < from: MR Foot No Cont, Left (10.28.23 @ 12:35) >  ACC: [id number] EXAM:  MR FOOT LT   ORDERED BY: RALPH MAYFIELD     PROCEDURE DATE:  10/28/2023      INTERPRETATION:  MR FOOT LEFT    HISTORY: Concern for osteomyelitis.    TECHNIQUE:  Multiplanar MRI of the left forefoot was performed without   contrast.    COMPARISON:  Left foot x-rays dated 10/24/2023.    FINDINGS:    OSSEOUS STRUCTURES    Fractures/Stress Reactions: Nondisplaced transverse fracture involves   the 1st proximal phalanx base..    Marrow:  Soft tissue wound appears to be present along the medial   margin of the 5th metatarsal phalangeal joint with slight subcortical   edema of the metatarsal head but no confluent T1 marrow replacement.  Faint presumed reactive or stress-related marrow edema is noted involving   the partially imaged talar head and navicular.    INTERPHALANGEAL JOINTS    Articular Surfaces:  Preserved.    Effusions/Synovitis:  None.    1ST METATARSOPHALANGEAL JOINT    Articular Surfaces:  Preserved.    Effusions/Synovitis:  None.    Sesamoids:  Intact.    LESSER METATARSOPHALANGEAL JOINTS    Articular Surfaces:  Preserved.    Effusions/Synovitis:  None.    TARSOMETATARSAL JOINTS    Articular Surfaces:  Preserved.    Effusions/Synovitis:  None.    INTERTARSAL JOINTS    Articular Surfaces:  Preserved.    Effusions/Synovitis:  None.    INTERMETATARSAL SPACES    Neuromas:  None.    Bursa:  Slight 1st through 3rd webspace bursa are noted.    LISFRANC LIGAMENT  Intact.    TENDONS    Flexor Tendons:  Intact.    Extensor Tendons:  Intact.    DISTAL PLANTAR FASCIA  Intact.    SOFT TISSUES    Musculature:  There is slight edema of the abductor hallucis muscle and   slight edema extending involving the plantar margin of the abductor   digiti minimi muscle. Changes may be traumatic or neuropathologic    Subcutaneous Tissues:  Mild to moderate scattered subcutis edema is   present. Soft tissue wound appears to be present along the lateral margin   of the 5th metatarsophalangeal joint. No abscess or gas is seen.    IMPRESSION:  1.  Soft tissue wound appearsto be present along the lateral margin of   the 5th metatarsophalangeal joint with slight subcortical edema of the   5th metatarsal head but no T1 marrow replacement. Changes are likely   reactive although early infection cannot be absolutely excluded.  2.  Nondisplaced fracture involves the 1st proximal phalanx base.    < end of copied text >          < from: TTE Echo Complete w/ Contrast w/ Doppler (10.25.23 @ 11:48) >  Summary:   1. Technically difficult study.   2. Mildly enlarged left atrium.   3. Normal wall motion. Left ventricular ejection fraction, by visual   estimation, is 55 to 60%.   4. Normal right atrial size.   5. Normal right ventricular size and function.   6. Mild pulmonic valve regurgitation.   7. There is no evidence of pericardial effusion.   8. No cardiac mass or vegetations visualized. However, TUCKER is a more   sensitive test to evaluate for these findings.    < end of copied text >

## 2023-10-30 NOTE — DISCHARGE NOTE PROVIDER - ATTENDING DISCHARGE PHYSICAL EXAMINATION:
ICU Vital Signs Last 24 Hrs  T(C): 36.9 (30 Oct 2023 08:25), Max: 36.9 (29 Oct 2023 16:45)  T(F): 98.5 (30 Oct 2023 08:25), Max: 98.5 (29 Oct 2023 16:45)  HR: 63 (30 Oct 2023 08:25) (62 - 69)  BP: 136/83 (30 Oct 2023 08:25) (111/73 - 148/87)  BP(mean): --  ABP: --  ABP(mean): --  RR: 18 (30 Oct 2023 08:25) (18 - 18)  SpO2: 97% (30 Oct 2023 08:25) (94% - 100%)    O2 Parameters below as of 30 Oct 2023 08:25  Patient On (Oxygen Delivery Method): room air    GENERAL: comfortable, not in distress  HEAD:  Atraumatic, Normocephalic  EYES: EOMI, PERRLA, conjunctiva and sclera clear  ENT:  Moist mucous membranes, No lesions  NECK: Supple, No JVD, Normal thyroid  NERVOUS SYSTEM:  Alert & Oriented X 3, Motor Strength 5/5 B/L upper and lower extremities  CHEST/LUNG: CTA bilaterally; No rales, rhonchi, wheezing, or rubs  HEART: Regular rate and rhythm; No murmurs, rubs, or gallops  ABDOMEN: Soft, Nontender, Nondistended; Bowel sounds present  EXTREMITIES:  2+ Peripheral Pulses, No clubbing, cyanosis, or edema  SKIN: No rashes or lesions         GENERAL: comfortable, not in distress  HEAD:  Atraumatic, Normocephalic  EYES: EOMI, PERRLA, conjunctiva and sclera clear  ENT:  Moist mucous membranes, No lesions  NECK: Supple, No JVD, Normal thyroid  NERVOUS SYSTEM:  Alert & Oriented X 3, Motor Strength 5/5 B/L upper and lower extremities  CHEST/LUNG: CTA bilaterally; No rales, rhonchi, wheezing, or rubs  HEART: Regular rate and rhythm; No murmurs, rubs, or gallops  ABDOMEN: Soft, Nontender, Nondistended; Bowel sounds present  EXTREMITIES:  2+ Peripheral Pulses, No clubbing, cyanosis, or edema  SKIN: No rashes or lesions     Vital Signs Last 24 Hrs  T(C): 37.1 (01 Nov 2023 09:26), Max: 37.2 (31 Oct 2023 20:45)  T(F): 98.8 (01 Nov 2023 09:26), Max: 98.9 (31 Oct 2023 20:45)  HR: 69 (01 Nov 2023 09:26) (58 - 72)  BP: 106/74 (01 Nov 2023 09:26) (106/74 - 156/79)  BP(mean): --  RR: 18 (01 Nov 2023 05:03) (17 - 18)  SpO2: 100% (01 Nov 2023 09:26) (95% - 100%)    Parameters below as of 01 Nov 2023 09:26  Patient On (Oxygen Delivery Method): room air      GENERAL: comfortable, not in distress  HEAD:  Atraumatic, Normocephalic  EYES: EOMI, PERRLA, conjunctiva and sclera clear  ENT:  Moist mucous membranes, No lesions  NECK: Supple, No JVD, Normal thyroid  NERVOUS SYSTEM:  Alert & Oriented X 3, Motor Strength 5/5 B/L upper and lower extremities  CHEST/LUNG: CTA bilaterally; No rales, rhonchi, wheezing, or rubs  HEART: Regular rate and rhythm; No murmurs, rubs, or gallops  ABDOMEN: Soft, Nontender, Nondistended; Bowel sounds present  EXTREMITIES:  2+ Peripheral Pulses, No clubbing, cyanosis, or edema  SKIN: No rashes or lesions

## 2023-10-30 NOTE — PROGRESS NOTE ADULT - ASSESSMENT
48F with PMHX Cauda Equina Syndrome, Chronic Back Pain, Hx DM2 (no longer on meds), HTN, MDD, Anxiety, Chronic Opiate Use, Tobacco Abuse admitted for GPC Bacteremia 2/2 Infected L Foot Wound.    #Blood cultures in the office on 10/23 with CoNS- Staph hominis  #L foot wound  - Repeat BCx w/ NGTD form 10/25  - antibx changed to started cefazolin per ID  - Wound care per Podiatry  - GERMAN unremarkable  - MRI- subcortical edema- per podiatry no surgical intervention needed  - ID - can go home with 1 more week of iv antibx with midline      Acute on Chronic Back Pain- controlled  - On Oxycodone IR 15mg q4h PRN; lidocaine  - istop checked- was on 15mg Q 6 hrs. since c/o acute pain will maintain Q4 inhouse   - after much d/w pt, increasing oxy to 20 Q 4 and making tylenol standing. she states that she consulted with her own outside doctors who recommended not to use lidocaine so she is refusing lidocaine  - CT reviewed - Small to moderate left paracentral disc protrusion L5-S1 with mass effect on the left S1 nerve root. Associated soft tissue within the left side of the spinal canal may be related to the protruded/extruded disc or left-sided facet joint.  - MRi spine done- L5-S1 Moderate left paracentral disc extrusion with inferior migration as well as compression and displacement of the left S1 nerve root.   - PM following    HTN controlled  - Metoprolol Succinate 100mg q24  - Hydralazine 50mg BID    MDD, Anxiety  - Zoloft 100mg q24  - Buspar 30mg BID    GERD  - Nexium 40mg q24    Hx DM2  - A1C: 6  - Reportedly no longer diabetic/ meds s/p weight loss    VTE PPX   -SCD/LMWH 40 q24  Dispo- will need wound care for home and also home care for IV antibx. midline order placed      Total time spent in this encounter assessing, diagnosing, analyzing and medical decision making is>35 mins.

## 2023-10-30 NOTE — PROGRESS NOTE ADULT - ATTENDING COMMENTS
MRI spine pending  podiatry f/u for mri foot pending  pain mostly well controlled    further plans after MRi    Patient is on medications/ conditions that requires close montioring- controlled substnaces, new findings on mri  Discussed teat findings, assessment and treatment plan with consultant dr crow  I have reviewed o/p records on istop; ordered essential and required tests; reviewed the results of labs, radiology, imaging, myself.  Total time spent in this encounter assessing,diagnosing,analyzing and medical decision making is>50 mins.
patient with left dropfoot due to underlying neurological disorder  left plantar submet wound epithelialized, no signs of infection  secondary to absent left ankle dorsiflexion, therefore continue with custom AFO brace  f/u with outpatient orthopedic specialist  podiatry stable for discharge
Patient seen and examined at bedside. No acute events overnight. Complaining of uncontrolled pain. Pain Management was consulted. ID following, currently on IV antibiotics. Repeat BCx pending. Evaluated and given wound care by Podiatry yesterday. MRI L foot pending. CT lumbar spine ordered due to complaints of back pain.

## 2023-10-30 NOTE — PROGRESS NOTE ADULT - SUBJECTIVE AND OBJECTIVE BOX
Interval HPI: Pt seen resting in bed in no acute distress. Denies any new complaints. Denies any constitutional symptoms.     Podiatry HPI: 48y year old Female seen at bedside for Left foot ulceration.  Patient relates to having the ulceration for couple of months but unsure in regards to exact timeline. States that the area drains time to time and then heals up. States that she saw a podiatrist named Dr. Li who gave her the Orthosis for her left lower extremity which is paralyzed.  Denies seeing anyone for the wound. Denies any other pedal complaints. No other complaints at this time.       Medications acetaminophen     Tablet .. 650 milliGRAM(s) Oral every 8 hours  aluminum hydroxide/magnesium hydroxide/simethicone Suspension 30 milliLiter(s) Oral every 4 hours PRN  busPIRone 30 milliGRAM(s) Oral two times a day  ceFAZolin  Injectable. 2000 milliGRAM(s) IV Push every 8 hours  enoxaparin Injectable 40 milliGRAM(s) SubCutaneous every 24 hours  hydrALAZINE 50 milliGRAM(s) Oral two times a day  influenza   Vaccine 0.5 milliLiter(s) IntraMuscular once  melatonin 3 milliGRAM(s) Oral at bedtime PRN  metoprolol succinate  milliGRAM(s) Oral daily  morphine  - Injectable 1 milliGRAM(s) IV Push every 4 hours PRN  ondansetron Injectable 4 milliGRAM(s) IV Push every 8 hours PRN  oxyCODONE    IR 20 milliGRAM(s) Oral every 4 hours PRN  pantoprazole    Tablet 40 milliGRAM(s) Oral before breakfast  polyethylene glycol 3350 17 Gram(s) Oral daily  senna 2 Tablet(s) Oral at bedtime  sertraline 100 milliGRAM(s) Oral daily    FH: No pertinent family history in first degree relatives    PMH: Dental infection    Hypertension, unspecified type    Diabetes mellitus    Sciatica    Chronic low back pain, unspecified back pain laterality, unspecified whether sciatica present    PSH: History of cholecystectomy    H/O Spinal surgery    Labs    Vital Signs Last 24 Hrs  T(C): 36.9 (30 Oct 2023 08:25), Max: 36.9 (29 Oct 2023 16:45)  T(F): 98.5 (30 Oct 2023 08:25), Max: 98.5 (29 Oct 2023 16:45)  HR: 63 (30 Oct 2023 08:25) (62 - 69)  BP: 136/83 (30 Oct 2023 08:25) (111/73 - 148/87)  BP(mean): --  RR: 18 (30 Oct 2023 08:25) (18 - 18)  SpO2: 97% (30 Oct 2023 08:25) (94% - 100%)    Parameters below as of 30 Oct 2023 08:25  Patient On (Oxygen Delivery Method): room air    Sedimentation Rate, Erythrocyte: 45 mm/hr (10-26-23 @ 16:29)  Sedimentation Rate, Erythrocyte: 40 mm/hr (10-25-23 @ 00:20)         C-Reactive Protein, Serum: 11 mg/L (10-30-23 @ 06:15)  C-Reactive Protein, Serum: 18 mg/L (10-26-23 @ 16:29)  C-Reactive Protein, Serum: 5 mg/L (10-25-23 @ 00:20)     CAPILLARY BLOOD GLUCOSE    ROS: All others negative unless otherwise stated in the HPI      Physical Exam:     Integument:  Skin warm, dry and supple bilateral.    Ulceration Left Plantar sub met 3-5 ulcer with, no hyperkeratotic border, macerated wound base , wound size (1.5cm X 1.5cm), - edema, - jeanne-wound erythema, - purulence, - fluctuance, - tracking/tunneling, - probe to bone, - malodor.   Vascular: Dorsalis Pedis and Posterior Tibial pulses 2/4.  Capillary re-fill time less then 3 seconds digits 1-5 bilateral.    Neuro: Protective sensation diminished to the left lower extremity  MSK: left lower extremity paralysis with no muscle strength.         MR Foot No Cont, Left (10.28.23 @ 12:35)    PROCEDURE DATE:  10/28/2023      INTERPRETATION:  MR FOOT LEFT    HISTORY: Concern for osteomyelitis.    TECHNIQUE:  Multiplanar MRI of the left forefoot was performed without   contrast.    COMPARISON:  Left foot x-rays dated 10/24/2023.    FINDINGS:    OSSEOUS STRUCTURES    Fractures/Stress Reactions: Nondisplaced transverse fracture involves   the 1st proximal phalanx base..    Marrow:  Soft tissue wound appears to be present along the medial   margin of the 5th metatarsal phalangeal joint with slight subcortical   edema of the metatarsal head but no confluent T1 marrow replacement.  Faint presumed reactive or stress-related marrow edema is noted involving   the partially imaged talar head and navicular.    INTERPHALANGEAL JOINTS    Articular Surfaces:  Preserved.    Effusions/Synovitis:  None.    1ST METATARSOPHALANGEAL JOINT    Articular Surfaces:  Preserved.    Effusions/Synovitis:  None.    Sesamoids:  Intact.    LESSER METATARSOPHALANGEAL JOINTS    Articular Surfaces:  Preserved.    Effusions/Synovitis:  None.    TARSOMETATARSAL JOINTS    Articular Surfaces:  Preserved.    Effusions/Synovitis:  None.    INTERTARSAL JOINTS    Articular Surfaces:  Preserved.    Effusions/Synovitis:  None.    INTERMETATARSAL SPACES    Neuromas:  None.    Bursa:  Slight 1st through 3rd webspace bursa are noted.    LISFRANC LIGAMENT  Intact.    TENDONS    Flexor Tendons:  Intact.    Extensor Tendons:  Intact.    DISTAL PLANTAR FASCIA  Intact.    SOFT TISSUES    Musculature:  There is slight edema of the abductor hallucis muscle and   slight edema extending involving the plantar margin of the abductor   digiti minimi muscle. Changes may be traumatic or neuropathologic    Subcutaneous Tissues:  Mild to moderate scattered subcutis edema is   present. Soft tissue wound appears to be present along the lateral margin   of the 5th metatarsophalangeal joint. No abscess or gas is seen.    IMPRESSION:  1.  Soft tissue wound appearsto be present along the lateral margin of   the 5th metatarsophalangeal joint with slight subcortical edema of the   5th metatarsal head but no T1 marrow replacement. Changes are likely   reactive although early infection cannot be absolutely excluded.  2.  Nondisplaced fracture involves the 1st proximal phalanx base.         Interval HPI: Pt seen resting in bed in no acute distress. Denies any new complaints. Denies any constitutional symptoms.     Podiatry HPI: 48y year old Female seen at bedside for Left foot ulceration.  Patient relates to having the ulceration for couple of months but unsure in regards to exact timeline. States that the area drains time to time and then heals up. States that she saw a podiatrist named Dr. Li who gave her the Orthosis for her left lower extremity which is paralyzed.  Denies seeing anyone for the wound. Denies any other pedal complaints. No other complaints at this time.       Medications acetaminophen     Tablet .. 650 milliGRAM(s) Oral every 8 hours  aluminum hydroxide/magnesium hydroxide/simethicone Suspension 30 milliLiter(s) Oral every 4 hours PRN  busPIRone 30 milliGRAM(s) Oral two times a day  ceFAZolin  Injectable. 2000 milliGRAM(s) IV Push every 8 hours  enoxaparin Injectable 40 milliGRAM(s) SubCutaneous every 24 hours  hydrALAZINE 50 milliGRAM(s) Oral two times a day  influenza   Vaccine 0.5 milliLiter(s) IntraMuscular once  melatonin 3 milliGRAM(s) Oral at bedtime PRN  metoprolol succinate  milliGRAM(s) Oral daily  morphine  - Injectable 1 milliGRAM(s) IV Push every 4 hours PRN  ondansetron Injectable 4 milliGRAM(s) IV Push every 8 hours PRN  oxyCODONE    IR 20 milliGRAM(s) Oral every 4 hours PRN  pantoprazole    Tablet 40 milliGRAM(s) Oral before breakfast  polyethylene glycol 3350 17 Gram(s) Oral daily  senna 2 Tablet(s) Oral at bedtime  sertraline 100 milliGRAM(s) Oral daily    FH: No pertinent family history in first degree relatives    PMH: Dental infection    Hypertension, unspecified type    Diabetes mellitus    Sciatica    Chronic low back pain, unspecified back pain laterality, unspecified whether sciatica present    PSH: History of cholecystectomy    H/O Spinal surgery    Labs    Vital Signs Last 24 Hrs  T(C): 36.9 (30 Oct 2023 08:25), Max: 36.9 (29 Oct 2023 16:45)  T(F): 98.5 (30 Oct 2023 08:25), Max: 98.5 (29 Oct 2023 16:45)  HR: 63 (30 Oct 2023 08:25) (62 - 69)  BP: 136/83 (30 Oct 2023 08:25) (111/73 - 148/87)  BP(mean): --  RR: 18 (30 Oct 2023 08:25) (18 - 18)  SpO2: 97% (30 Oct 2023 08:25) (94% - 100%)    Parameters below as of 30 Oct 2023 08:25  Patient On (Oxygen Delivery Method): room air    Sedimentation Rate, Erythrocyte: 45 mm/hr (10-26-23 @ 16:29)  Sedimentation Rate, Erythrocyte: 40 mm/hr (10-25-23 @ 00:20)         C-Reactive Protein, Serum: 11 mg/L (10-30-23 @ 06:15)  C-Reactive Protein, Serum: 18 mg/L (10-26-23 @ 16:29)  C-Reactive Protein, Serum: 5 mg/L (10-25-23 @ 00:20)     CAPILLARY BLOOD GLUCOSE    ROS: All others negative unless otherwise stated in the HPI      Physical Exam:     Integument:  Skin warm, dry and supple bilateral.    Ulceration Left Plantar sub met 3-5 ulcer which now epithelialized without opening - edema, - jeanne-wound erythema, - purulence, - fluctuance, - tracking/tunneling, - probe to bone, - malodor.   Vascular: Dorsalis Pedis and Posterior Tibial pulses 2/4.  Capillary re-fill time less then 3 seconds digits 1-5 bilateral.    Neuro: Protective sensation diminished to the left lower extremity  MSK: left lower extremity paralysis with no muscle strength, absent dorsiflexion left ankle joint        MR Foot No Cont, Left (10.28.23 @ 12:35)    PROCEDURE DATE:  10/28/2023      INTERPRETATION:  MR FOOT LEFT    HISTORY: Concern for osteomyelitis.    TECHNIQUE:  Multiplanar MRI of the left forefoot was performed without   contrast.    COMPARISON:  Left foot x-rays dated 10/24/2023.    FINDINGS:    OSSEOUS STRUCTURES    Fractures/Stress Reactions: Nondisplaced transverse fracture involves   the 1st proximal phalanx base..    Marrow:  Soft tissue wound appears to be present along the medial   margin of the 5th metatarsal phalangeal joint with slight subcortical   edema of the metatarsal head but no confluent T1 marrow replacement.  Faint presumed reactive or stress-related marrow edema is noted involving   the partially imaged talar head and navicular.    INTERPHALANGEAL JOINTS    Articular Surfaces:  Preserved.    Effusions/Synovitis:  None.    1ST METATARSOPHALANGEAL JOINT    Articular Surfaces:  Preserved.    Effusions/Synovitis:  None.    Sesamoids:  Intact.    LESSER METATARSOPHALANGEAL JOINTS    Articular Surfaces:  Preserved.    Effusions/Synovitis:  None.    TARSOMETATARSAL JOINTS    Articular Surfaces:  Preserved.    Effusions/Synovitis:  None.    INTERTARSAL JOINTS    Articular Surfaces:  Preserved.    Effusions/Synovitis:  None.    INTERMETATARSAL SPACES    Neuromas:  None.    Bursa:  Slight 1st through 3rd webspace bursa are noted.    LISFRANC LIGAMENT  Intact.    TENDONS    Flexor Tendons:  Intact.    Extensor Tendons:  Intact.    DISTAL PLANTAR FASCIA  Intact.    SOFT TISSUES    Musculature:  There is slight edema of the abductor hallucis muscle and   slight edema extending involving the plantar margin of the abductor   digiti minimi muscle. Changes may be traumatic or neuropathologic    Subcutaneous Tissues:  Mild to moderate scattered subcutis edema is   present. Soft tissue wound appears to be present along the lateral margin   of the 5th metatarsophalangeal joint. No abscess or gas is seen.    IMPRESSION:  1.  Soft tissue wound appearsto be present along the lateral margin of   the 5th metatarsophalangeal joint with slight subcortical edema of the   5th metatarsal head but no T1 marrow replacement. Changes are likely   reactive although early infection cannot be absolutely excluded.  2.  Nondisplaced fracture involves the 1st proximal phalanx base.

## 2023-10-30 NOTE — DISCHARGE NOTE PROVIDER - NSDCFUSCHEDAPPT_GEN_ALL_CORE_FT
Zuleyka Mccabe  [facility]  [facility] [address]  Scheduled Appointment: 11/21/2023

## 2023-10-30 NOTE — PROGRESS NOTE ADULT - SUBJECTIVE AND OBJECTIVE BOX
HEALTH ISSUES - PROBLEM Dx:    staph hominis bacteremia  left foot ulcer  chr back pain with chr cauda equina    INTERVAL HPI/ OVERNIGHT EVENTS:    comfortbale  now askign for IV tyelnol in addition to all the pain med  otherwise ambualting well in hallways  discussed discharge plans    REVIEW OF SYSTEMS:    as above    Vital Signs Last 24 Hrs  T(C): 36.8 (30 Oct 2023 16:15), Max: 36.9 (30 Oct 2023 05:18)  T(F): 98.2 (30 Oct 2023 16:15), Max: 98.5 (30 Oct 2023 08:25)  HR: 66 (30 Oct 2023 17:00) (62 - 69)  BP: 120/77 (30 Oct 2023 17:00) (111/73 - 148/87)  BP(mean): --  RR: 18 (30 Oct 2023 16:15) (18 - 18)  SpO2: 95% (30 Oct 2023 16:15) (94% - 100%)    Parameters below as of 30 Oct 2023 16:15  Patient On (Oxygen Delivery Method): room air      PHYSICAL EXAM-  GENERAL: comfortable, not in distress  HEAD:  Atraumatic, Normocephalic  EYES: EOMI, PERRLA, conjunctiva and sclera clear  ENT:  Moist mucous membranes, No lesions  NECK: Supple, No JVD, Normal thyroid  NERVOUS SYSTEM:  Alert & Oriented X 3, Motor Strength 5/5 B/L upper and lower extremities  CHEST/LUNG: CTA bilaterally; No rales, rhonchi, wheezing, or rubs  HEART: Regular rate and rhythm; No murmurs, rubs, or gallops  ABDOMEN: Soft, Nontender, Nondistended; Bowel sounds present  EXTREMITIES:  2+ Peripheral Pulses, No clubbing, cyanosis, or edema  SKIN: No rashes or lesions    MEDICATIONS  (STANDING):  acetaminophen     Tablet .. 650 milliGRAM(s) Oral every 8 hours  busPIRone 30 milliGRAM(s) Oral two times a day  ceFAZolin  Injectable. 2000 milliGRAM(s) IV Push every 8 hours  enoxaparin Injectable 40 milliGRAM(s) SubCutaneous every 24 hours  hydrALAZINE 50 milliGRAM(s) Oral two times a day  influenza   Vaccine 0.5 milliLiter(s) IntraMuscular once  metoprolol succinate  milliGRAM(s) Oral daily  pantoprazole    Tablet 40 milliGRAM(s) Oral before breakfast  polyethylene glycol 3350 17 Gram(s) Oral daily  senna 2 Tablet(s) Oral at bedtime  sertraline 100 milliGRAM(s) Oral daily    MEDICATIONS  (PRN):  aluminum hydroxide/magnesium hydroxide/simethicone Suspension 30 milliLiter(s) Oral every 4 hours PRN Dyspepsia  melatonin 3 milliGRAM(s) Oral at bedtime PRN Insomnia  morphine  - Injectable 1 milliGRAM(s) IV Push every 4 hours PRN break through pain  ondansetron Injectable 4 milliGRAM(s) IV Push every 8 hours PRN Nausea and/or Vomiting  oxyCODONE    IR 20 milliGRAM(s) Oral every 4 hours PRN Severe Pain (7 - 10)

## 2023-10-30 NOTE — DISCHARGE NOTE PROVIDER - CARE PROVIDER_API CALL
Zuleyka Mccabe  Infectious Disease  [address] [place] [address]  Phone: [phone]  Fax: [phone]  Follow Up Time:

## 2023-10-30 NOTE — PROGRESS NOTE ADULT - ASSESSMENT
48-year-old female with a complicated medical h/o L4/5 decompression done on January 13, 2019 at [facility]. At that time she had cauda equina syndrome and was admitted in February 2019 for wound infection from that surgery. She had MSSA growing from the incision site and had a I&D done on February 22, 2019. She was discharged on IV cefazolin which she completed in April 4, 2019. She followed up with us after that time. Stopped following up after April 21 2020. She recently was seen at [facility] emergency room for skin peeling. She was discharged from the emergency room after her labs and work-up was within normal limits. Patient is concerned for any infection since she had a severe infection in 2019 and is traumatized from that, so was seen in the office on 5/16/23 and had a negative work up. Patient was seen in the office on 10/23 with c/o chills and drainage from the left foot. Blood cultures and blood work was obtained in the office. Blood cultures with GPC. Patient was referred to the ER. In the Hospital patient has been afebrile, no leukocytosis. Started on Vancomycin and meropenem.       Positive blood cultures  Chills  Left foot drainage from wound    - Blood cultures in the office on 10/23 with CoNS  (S) Cefazolin  - Repeat blood cultures 10/25 no growth   - XR Foot with no acute findings   - MRI foot, left with no OM  - Podiatry eval noted   - Procalcitonin level in office < 0.03  - CRP 11  - ESR 40  - TTE reporting no veg   - Continue Cefazolin   - Plan for antibiotics till 11/7/23  - Will plan on midline when ready for D/C  - Will need weekly CBC and CMP faxed to [phone]  - Appointment with us in 7 to 10 days - [phone]  - Trend Fever  - Trend WBC      Will Follow    Discussed treatment plan with: Dr Keys

## 2023-10-30 NOTE — DISCHARGE NOTE PROVIDER - NSDCMRMEDTOKEN_GEN_ALL_CORE_FT
busPIRone 30 mg oral tablet: 1 tab(s) orally 2 times a day  hydrALAZINE 50 mg oral tablet: 1 tab(s) orally 2 times a day  metoprolol succinate 100 mg oral capsule, extended release: 1 cap(s) orally once a day  NexIUM 40 mg oral delayed release capsule: 1 cap(s) orally once a day  oxyCODONE 10 mg oral tablet: 1 tab(s) orally every 6 hours MDD:40mg  Zofran 4 mg oral tablet: 1 tab(s) orally 4 times a day as needed for  nausea  Zoloft 100 mg oral tablet: 1 tab(s) orally once a day   busPIRone 30 mg oral tablet: 1 tab(s) orally 2 times a day  ceFAZolin 2 g intravenous injection: 2 gram(s) intravenous every 8 hours  hydrALAZINE 50 mg oral tablet: 1 tab(s) orally 2 times a day  metoprolol succinate 100 mg oral capsule, extended release: 1 cap(s) orally once a day  NexIUM 40 mg oral delayed release capsule: 1 cap(s) orally once a day  oxyCODONE 5 mg oral tablet: 3 tab(s) orally every 6 hours as needed for sev pain  polyethylene glycol 3350 oral powder for reconstitution: 17 gram(s) orally once a day  senna leaf extract oral tablet: 2 tab(s) orally once a day (at bedtime)  Zofran 4 mg oral tablet: 1 tab(s) orally 4 times a day as needed for  nausea  Zoloft 100 mg oral tablet: 1 tab(s) orally once a day

## 2023-10-30 NOTE — DISCHARGE NOTE PROVIDER - HOSPITAL COURSE
48F with significant pmh of  Cauda Equina Syndrome, Chronic Back Pain, Hx DM2 (no longer on meds), HTN, MDD, Anxiety, Chronic Opiate Use, Tobacco Abuse admitted for positive blood cultures. BCX +GPC Clusters x2 speciation pending. Repeat BCX x2 obtained. Patient given Vanco 1g IVx 1 in ED. Infected L Foot wound +purulent drainage for past few weeks likely source of bacteremia. She was started on vancomycin and morepenam and was switched to cefazolin after MRSA negative, cultures grew staph hominis. Repeat blood cultures were negative. CT Lumbar spine with contrast. CT shows disc protrusion L5-S1 level with mass effect on S1 root. MR lumbar w and w/o contrast confirms findings.  MR Foot showed subcortical edema along lateral MTP joint metatarsal head of the 5th toe and nondisplaced fracture of 1st proximal phalanx bone. Pain was managed with home oxycodone dose with slight increase with standing tylenol. Podiatry followed for wound care of the ulcer at the bottom of the left foot, dressed and wrapped it, and noted wound to be closed. Patient is clinically stable and medically cleared for discharge with follow up with infectious disease and podiatry at [place]   48F with significant pmh of  Cauda Equina Syndrome, Chronic Back Pain, Hx DM2 (no longer on meds), HTN, MDD, Anxiety, Chronic Opiate Use, Tobacco Abuse admitted for positive blood cultures. BCX +GPC Clusters x2 speciation pending. Repeat BCX x2 obtained. Patient given Vanco 1g IVx 1 in ED. Infected L Foot wound +purulent drainage for past few weeks likely source of bacteremia. She was started on vancomycin and morepenam and was switched to cefazolin after MRSA negative, cultures grew staph hominis. Repeat blood cultures were negative. CT Lumbar spine with contrast. CT shows disc protrusion L5-S1 level with mass effect on S1 root. MR lumbar w and w/o contrast confirms findings.  MR Foot showed subcortical edema along lateral MTP joint metatarsal head of the 5th toe and nondisplaced fracture of 1st proximal phalanx bone. Per podiatry not acute. Pain was managed with home oxycodone dose with slight increase with standing tylenol. Podiatry followed for wound care of the ulcer at the bottom of the left foot, dressed and wrapped it, and noted wound to be closed. Patient is clinically stable and medically cleared for discharge with follow up with infectious disease and podiatry at [place].  she will go hoem with midline and iv antibx via midline and follow [facility] ID.

## 2023-10-30 NOTE — DISCHARGE NOTE PROVIDER - NSDCCPCAREPLAN_GEN_ALL_CORE_FT
PRINCIPAL DISCHARGE DIAGNOSIS  Diagnosis: Bacteremia  Assessment and Plan of Treatment: staph hominis      SECONDARY DISCHARGE DIAGNOSES  Diagnosis: Wound of left foot  Assessment and Plan of Treatment:     Diagnosis: Diabetes  Assessment and Plan of Treatment:     Diagnosis: HTN (hypertension)  Assessment and Plan of Treatment:     Diagnosis: Cauda equina syndrome  Assessment and Plan of Treatment: chronic  s/p surgery

## 2023-10-31 ENCOUNTER — TRANSCRIPTION ENCOUNTER (OUTPATIENT)
Age: 48
End: 2023-10-31

## 2023-10-31 PROCEDURE — 99232 SBSQ HOSP IP/OBS MODERATE 35: CPT | Mod: GC

## 2023-10-31 PROCEDURE — 76937 US GUIDE VASCULAR ACCESS: CPT | Mod: 26

## 2023-10-31 PROCEDURE — 36556 INSERT NON-TUNNEL CV CATH: CPT

## 2023-10-31 PROCEDURE — 99233 SBSQ HOSP IP/OBS HIGH 50: CPT

## 2023-10-31 PROCEDURE — 76942 ECHO GUIDE FOR BIOPSY: CPT | Mod: 26

## 2023-10-31 RX ADMIN — MORPHINE SULFATE 1 MILLIGRAM(S): 50 CAPSULE, EXTENDED RELEASE ORAL at 09:55

## 2023-10-31 RX ADMIN — MORPHINE SULFATE 1 MILLIGRAM(S): 50 CAPSULE, EXTENDED RELEASE ORAL at 05:39

## 2023-10-31 RX ADMIN — Medication 650 MILLIGRAM(S): at 05:40

## 2023-10-31 RX ADMIN — OXYCODONE HYDROCHLORIDE 20 MILLIGRAM(S): 5 TABLET ORAL at 13:55

## 2023-10-31 RX ADMIN — MORPHINE SULFATE 1 MILLIGRAM(S): 50 CAPSULE, EXTENDED RELEASE ORAL at 09:40

## 2023-10-31 RX ADMIN — OXYCODONE HYDROCHLORIDE 20 MILLIGRAM(S): 5 TABLET ORAL at 05:00

## 2023-10-31 RX ADMIN — Medication 2000 MILLIGRAM(S): at 14:45

## 2023-10-31 RX ADMIN — OXYCODONE HYDROCHLORIDE 20 MILLIGRAM(S): 5 TABLET ORAL at 08:40

## 2023-10-31 RX ADMIN — ENOXAPARIN SODIUM 40 MILLIGRAM(S): 100 INJECTION SUBCUTANEOUS at 05:39

## 2023-10-31 RX ADMIN — Medication 650 MILLIGRAM(S): at 06:20

## 2023-10-31 RX ADMIN — Medication 50 MILLIGRAM(S): at 05:40

## 2023-10-31 RX ADMIN — MORPHINE SULFATE 1 MILLIGRAM(S): 50 CAPSULE, EXTENDED RELEASE ORAL at 14:46

## 2023-10-31 RX ADMIN — Medication 2000 MILLIGRAM(S): at 05:38

## 2023-10-31 RX ADMIN — POLYETHYLENE GLYCOL 3350 17 GRAM(S): 17 POWDER, FOR SOLUTION ORAL at 12:55

## 2023-10-31 RX ADMIN — OXYCODONE HYDROCHLORIDE 20 MILLIGRAM(S): 5 TABLET ORAL at 16:59

## 2023-10-31 RX ADMIN — Medication 100 MILLIGRAM(S): at 05:40

## 2023-10-31 RX ADMIN — Medication 50 MILLIGRAM(S): at 17:00

## 2023-10-31 RX ADMIN — Medication 650 MILLIGRAM(S): at 14:46

## 2023-10-31 RX ADMIN — OXYCODONE HYDROCHLORIDE 20 MILLIGRAM(S): 5 TABLET ORAL at 17:59

## 2023-10-31 RX ADMIN — ONDANSETRON 4 MILLIGRAM(S): 8 TABLET, FILM COATED ORAL at 14:46

## 2023-10-31 RX ADMIN — SERTRALINE 100 MILLIGRAM(S): 25 TABLET, FILM COATED ORAL at 12:56

## 2023-10-31 RX ADMIN — OXYCODONE HYDROCHLORIDE 20 MILLIGRAM(S): 5 TABLET ORAL at 21:41

## 2023-10-31 RX ADMIN — OXYCODONE HYDROCHLORIDE 20 MILLIGRAM(S): 5 TABLET ORAL at 07:40

## 2023-10-31 RX ADMIN — Medication 650 MILLIGRAM(S): at 21:41

## 2023-10-31 RX ADMIN — MORPHINE SULFATE 1 MILLIGRAM(S): 50 CAPSULE, EXTENDED RELEASE ORAL at 23:34

## 2023-10-31 RX ADMIN — Medication 2000 MILLIGRAM(S): at 21:41

## 2023-10-31 RX ADMIN — Medication 650 MILLIGRAM(S): at 22:20

## 2023-10-31 RX ADMIN — MORPHINE SULFATE 1 MILLIGRAM(S): 50 CAPSULE, EXTENDED RELEASE ORAL at 02:30

## 2023-10-31 RX ADMIN — MORPHINE SULFATE 1 MILLIGRAM(S): 50 CAPSULE, EXTENDED RELEASE ORAL at 15:01

## 2023-10-31 RX ADMIN — OXYCODONE HYDROCHLORIDE 20 MILLIGRAM(S): 5 TABLET ORAL at 03:29

## 2023-10-31 RX ADMIN — Medication 30 MILLIGRAM(S): at 05:39

## 2023-10-31 RX ADMIN — MORPHINE SULFATE 1 MILLIGRAM(S): 50 CAPSULE, EXTENDED RELEASE ORAL at 01:32

## 2023-10-31 RX ADMIN — Medication 30 MILLIGRAM(S): at 16:59

## 2023-10-31 RX ADMIN — OXYCODONE HYDROCHLORIDE 20 MILLIGRAM(S): 5 TABLET ORAL at 22:20

## 2023-10-31 RX ADMIN — PANTOPRAZOLE SODIUM 40 MILLIGRAM(S): 20 TABLET, DELAYED RELEASE ORAL at 05:39

## 2023-10-31 RX ADMIN — OXYCODONE HYDROCHLORIDE 20 MILLIGRAM(S): 5 TABLET ORAL at 12:55

## 2023-10-31 RX ADMIN — MORPHINE SULFATE 1 MILLIGRAM(S): 50 CAPSULE, EXTENDED RELEASE ORAL at 06:20

## 2023-10-31 RX ADMIN — Medication 650 MILLIGRAM(S): at 15:46

## 2023-10-31 NOTE — PROGRESS NOTE ADULT - ASSESSMENT
48F with PMHX Cauda Equina Syndrome, Chronic Back Pain, Hx DM2 (no longer on meds), HTN, MDD, Anxiety, Chronic Opiate Use, Tobacco Abuse admitted for GPC Bacteremia 2/2 Infected L Foot Wound.    #Blood cultures in the office on 10/23 with CoNS- Staph hominis  #L foot wound  - Repeat BCx w/ NGTD form 10/25  - antibx changed to started cefazolin per ID  - Wound care per Podiatry  - GERMAN unremarkable  - MRI- subcortical edema- per podiatry no surgical intervention needed  - ID - can go home with 1 more week of iv antibx with midline      Acute on Chronic Back Pain- controlled  - On Oxycodone IR 15mg q4h PRN; lidocaine  - istop checked- was on 15mg Q 6 hrs. since c/o acute pain will maintain Q4 inhouse   - after much d/w pt, increasing oxy to 20 Q 4 and making tylenol standing. she states that she consulted with her own outside doctors who recommended not to use lidocaine so she is refusing lidocaine  - CT reviewed - Small to moderate left paracentral disc protrusion L5-S1 with mass effect on the left S1 nerve root. Associated soft tissue within the left side of the spinal canal may be related to the protruded/extruded disc or left-sided facet joint.  - MRi spine done- L5-S1 Moderate left paracentral disc extrusion with inferior migration as well as compression and displacement of the left S1 nerve root.   - PM following    HTN controlled  - Metoprolol Succinate 100mg q24  - Hydralazine 50mg BID    MDD, Anxiety  - Zoloft 100mg q24  - Buspar 30mg BID    GERD  - Nexium 40mg q24    Hx DM2  - A1C: 6  - Reportedly no longer diabetic/ meds s/p weight loss    VTE PPX   -SCD/LMWH 40 q24  Dispo- home with iv antibx tomorrow as home care will begin tomorrow      Total time spent in this encounter assessing, diagnosing, analyzing and medical decision making is>35 mins.

## 2023-10-31 NOTE — PROGRESS NOTE ADULT - SUBJECTIVE AND OBJECTIVE BOX
[facility]  INFECTIOUS DISEASES at [place] / [place] / [place]  =======================================================                               Perry Carreno MD#   Zuleyka Mccabe MD*                             Lizzy Acosta MD*   Barbara Luna MD*                              Professor Emeritus:  Dr Bebeto Mccall MD^            Diplomates American Board of Internal Medicine & Infectious Diseases                # [place] Office - Appt - Tel  [phone] Fax [phone]                * [place] Office - Appt - Tel [phone] Fax [phone]                      ^[place] Office - Tel  [phone] Fax [phone]                                  Hospital Consult line:  [phone]  =======================================================    CLARENCE VITALE [id number]    Follow up: Positive blood cultures    no fevers       Allergies:  penicillin (Unknown)  Milk (Unknown)  amoxicillin (Anaphylaxis)  citrus (Unknown)      REVIEW OF SYSTEMS:  CONSTITUTIONAL:  No Fever or chills  HEENT:  No diplopia or blurred vision.  No earache, sore throat or runny nose.  CARDIOVASCULAR:  No chest pain  RESPIRATORY:  No cough, shortness of breath  GASTROINTESTINAL:  No nausea, vomiting or diarrhea.  GENITOURINARY:  No dysuria, frequency or urgency. No Blood in urine  MUSCULOSKELETAL:  no joint aches, no muscle pain  SKIN:  No change in skin, hair or nails.  NEUROLOGIC:  No Headaches      Physical Exam:  GEN: NAD  HEENT: normocephalic and atraumatic. EOMI. PERRL.    NECK: Supple.   LUNGS: CTA B/L.  HEART: RRR  ABDOMEN: Soft, NT, ND.  +BS.    : No CVA tenderness  EXTREMITIES: Without  edema.  MSK: No joint swelling  NEUROLOGIC: No Focal Deficits   PSYCHIATRIC: Appropriate affect .  SKIN: No rash      Vitals:  T(F): 97.7 (31 Oct 2023 04:45), Max: 98.9 (30 Oct 2023 20:45)  HR: 67 (31 Oct 2023 04:45)  BP: 155/86 (31 Oct 2023 04:45)  RR: 18 (31 Oct 2023 04:45)  SpO2: 100% (31 Oct 2023 04:45) (95% - 100%)  temp max in last 48H T(F): , Max: 98.9 (10-30-23 @ 20:45)    Current Antibiotics:  ceFAZolin  Injectable. 2000 milliGRAM(s) IV Push every 8 hours    Other medications:  acetaminophen     Tablet .. 650 milliGRAM(s) Oral every 8 hours  busPIRone 30 milliGRAM(s) Oral two times a day  enoxaparin Injectable 40 milliGRAM(s) SubCutaneous every 24 hours  hydrALAZINE 50 milliGRAM(s) Oral two times a day  influenza   Vaccine 0.5 milliLiter(s) IntraMuscular once  metoprolol succinate  milliGRAM(s) Oral daily  pantoprazole    Tablet 40 milliGRAM(s) Oral before breakfast  polyethylene glycol 3350 17 Gram(s) Oral daily  senna 2 Tablet(s) Oral at bedtime  sertraline 100 milliGRAM(s) Oral daily        RECENT CULTURES:  10-25 @ 00:20 .Blood Blood-Peripheral     No growth at 5 days    10-25 @ 00:15 .Blood Blood-Peripheral     No growth at 5 days      WBC Count: 5.93 K/uL (10-28-23 @ 07:05)  WBC Count: 6.95 K/uL (10-27-23 @ 06:25)    Creatinine: 0.76 mg/dL (10-28-23 @ 07:05)  Creatinine: 0.70 mg/dL (10-27-23 @ 06:25)    C-Reactive Protein, Serum: 11 mg/L (10-30-23 @ 06:15)  C-Reactive Protein, Serum: 18 mg/L (10-26-23 @ 16:29)  C-Reactive Protein, Serum: 5 mg/L (10-25-23 @ 00:20)    Sedimentation Rate, Erythrocyte: 45 mm/hr (10-26-23 @ 16:29)  Sedimentation Rate, Erythrocyte: 40 mm/hr (10-25-23 @ 00:20)         < from: MR Foot No Cont, Left (10.28.23 @ 12:35) >  ACC: [id number] EXAM:  MR FOOT LT   ORDERED BY: RALPH MAYFIELD     PROCEDURE DATE:  10/28/2023      INTERPRETATION:  MR FOOT LEFT    HISTORY: Concern for osteomyelitis.    TECHNIQUE:  Multiplanar MRI of the left forefoot was performed without   contrast.    COMPARISON:  Left foot x-rays dated 10/24/2023.    FINDINGS:    OSSEOUS STRUCTURES    Fractures/Stress Reactions: Nondisplaced transverse fracture involves   the 1st proximal phalanx base..    Marrow:  Soft tissue wound appears to be present along the medial   margin of the 5th metatarsal phalangeal joint with slight subcortical   edema of the metatarsal head but no confluent T1 marrow replacement.  Faint presumed reactive or stress-related marrow edema is noted involving   the partially imaged talar head and navicular.    INTERPHALANGEAL JOINTS    Articular Surfaces:  Preserved.    Effusions/Synovitis:  None.    1ST METATARSOPHALANGEAL JOINT    Articular Surfaces:  Preserved.    Effusions/Synovitis:  None.    Sesamoids:  Intact.    LESSER METATARSOPHALANGEAL JOINTS    Articular Surfaces:  Preserved.    Effusions/Synovitis:  None.    TARSOMETATARSAL JOINTS    Articular Surfaces:  Preserved.    Effusions/Synovitis:  None.    INTERTARSAL JOINTS    Articular Surfaces:  Preserved.    Effusions/Synovitis:  None.    INTERMETATARSAL SPACES    Neuromas:  None.    Bursa:  Slight 1st through 3rd webspace bursa are noted.    LISFRANC LIGAMENT  Intact.    TENDONS    Flexor Tendons:  Intact.    Extensor Tendons:  Intact.    DISTAL PLANTAR FASCIA  Intact.    SOFT TISSUES    Musculature:  There is slight edema of the abductor hallucis muscle and   slight edema extending involving the plantar margin of the abductor   digiti minimi muscle. Changes may be traumatic or neuropathologic    Subcutaneous Tissues:  Mild to moderate scattered subcutis edema is   present. Soft tissue wound appears to be present along the lateral margin   of the 5th metatarsophalangeal joint. No abscess or gas is seen.    IMPRESSION:  1.  Soft tissue wound appearsto be present along the lateral margin of   the 5th metatarsophalangeal joint with slight subcortical edema of the   5th metatarsal head but no T1 marrow replacement. Changes are likely   reactive although early infection cannot be absolutely excluded.  2.  Nondisplaced fracture involves the 1st proximal phalanx base.    < end of copied text >          < from: TTE Echo Complete w/ Contrast w/ Doppler (10.25.23 @ 11:48) >  Summary:   1. Technically difficult study.   2. Mildly enlarged left atrium.   3. Normal wall motion. Left ventricular ejection fraction, by visual   estimation, is 55 to 60%.   4. Normal right atrial size.   5. Normal right ventricular size and function.   6. Mild pulmonic valve regurgitation.   7. There is no evidence of pericardial effusion.   8. No cardiac mass or vegetations visualized. However, TUCKER is a more   sensitive test to evaluate for these findings.    < end of copied text >

## 2023-10-31 NOTE — PROGRESS NOTE ADULT - TIME BILLING
This includes chart review, patient assessment, discussion with Dr Keys. Antibiotic dosing and management with clinical pharmacy. Arranging home Intravenous antibiotics and educating patient about home intravenous antibiotics.

## 2023-10-31 NOTE — DISCHARGE NOTE NURSING/CASE MANAGEMENT/SOCIAL WORK - NSDCPEFALRISK_GEN_ALL_CORE
For information on Fall & Injury Prevention, visit: [email or web address] OR  [email or web address] OR  https://www.cdc.gov/steadi/patient.html

## 2023-10-31 NOTE — PROCEDURE NOTE - ADDITIONAL PROCEDURE DETAILS
#18G 10CM 32CIRC BARD POWER GLIDE MIDLINE inserted with ultrasound guidance.   Good flash, ns flush right basilic vein.   Patient tolerated well.

## 2023-10-31 NOTE — DISCHARGE NOTE NURSING/CASE MANAGEMENT/SOCIAL WORK - PATIENT PORTAL LINK FT
You can access the FollowMyHealth Patient Portal offered by [facility] by registering at the following website: [email or web address]. By joining [facility]’s FollowMyHealth portal, you will also be able to view your health information using other applications (apps) compatible with our system.

## 2023-10-31 NOTE — PROGRESS NOTE ADULT - ASSESSMENT
48-year-old female with a complicated medical h/o L4/5 decompression done on January 13, 2019 at [facility]. At that time she had cauda equina syndrome and was admitted in February 2019 for wound infection from that surgery. She had MSSA growing from the incision site and had a I&D done on February 22, 2019. She was discharged on IV cefazolin which she completed in April 4, 2019. She followed up with us after that time. Stopped following up after April 21 2020. She recently was seen at [facility] emergency room for skin peeling. She was discharged from the emergency room after her labs and work-up was within normal limits. Patient is concerned for any infection since she had a severe infection in 2019 and is traumatized from that, so was seen in the office on 5/16/23 and had a negative work up. Patient was seen in the office on 10/23 with c/o chills and drainage from the left foot. Blood cultures and blood work was obtained in the office. Blood cultures with GPC. Patient was referred to the ER. In the Hospital patient has been afebrile, no leukocytosis. Started on Vancomycin and meropenem.       Positive blood cultures  Chills  Left foot drainage from wound    - Blood cultures in the office on 10/23 with CoNS  (S) Cefazolin  - Repeat blood cultures 10/25 no growth   - XR Foot with no acute findings   - MRI foot, left with no OM  - Podiatry eval noted   - Procalcitonin level in office < 0.03  - CRP 11  - ESR 40  - TTE reporting no veg   - Continue Cefazolin   - Plan for antibiotics till 11/7/23  - s/p  midline   - Appointment with us in 7 to 10 days - [phone]  - Trend Fever  - Trend WBC      Will Follow    Discussed treatment plan with: Dr Keys

## 2023-10-31 NOTE — PROGRESS NOTE ADULT - SUBJECTIVE AND OBJECTIVE BOX
HEALTH ISSUES - PROBLEM Dx:    staph hominis bacteremia  left foot ulcer  chr back pain with chr cauda equina    INTERVAL HPI/ OVERNIGHT EVENTS:    comfortbale  ambualting well in hallways  discussed discharge plans    REVIEW OF SYSTEMS:    as above    Vital Signs Last 24 Hrs  T(C): 36.7 (31 Oct 2023 11:00), Max: 37.2 (30 Oct 2023 20:45)  T(F): 98 (31 Oct 2023 11:00), Max: 98.9 (30 Oct 2023 20:45)  HR: 65 (31 Oct 2023 11:00) (62 - 67)  BP: 131/82 (31 Oct 2023 11:00) (120/77 - 155/86)  BP(mean): --  RR: 18 (31 Oct 2023 11:00) (17 - 18)  SpO2: 100% (31 Oct 2023 11:00) (98% - 100%)    Parameters below as of 31 Oct 2023 11:00  Patient On (Oxygen Delivery Method): room air        PHYSICAL EXAM-  GENERAL: comfortable, not in distress  HEAD:  Atraumatic, Normocephalic  EYES: EOMI, PERRLA, conjunctiva and sclera clear  ENT:  Moist mucous membranes, No lesions  NECK: Supple, No JVD, Normal thyroid  NERVOUS SYSTEM:  Alert & Oriented X 3, Motor Strength 5/5 B/L upper and lower extremities  CHEST/LUNG: CTA bilaterally; No rales, rhonchi, wheezing, or rubs  HEART: Regular rate and rhythm; No murmurs, rubs, or gallops  ABDOMEN: Soft, Nontender, Nondistended; Bowel sounds present  EXTREMITIES:  2+ Peripheral Pulses, No clubbing, cyanosis, or edema  SKIN: No rashes or lesions    MEDICATIONS  (STANDING):  acetaminophen     Tablet .. 650 milliGRAM(s) Oral every 8 hours  busPIRone 30 milliGRAM(s) Oral two times a day  ceFAZolin  Injectable. 2000 milliGRAM(s) IV Push every 8 hours  enoxaparin Injectable 40 milliGRAM(s) SubCutaneous every 24 hours  hydrALAZINE 50 milliGRAM(s) Oral two times a day  influenza   Vaccine 0.5 milliLiter(s) IntraMuscular once  metoprolol succinate  milliGRAM(s) Oral daily  pantoprazole    Tablet 40 milliGRAM(s) Oral before breakfast  polyethylene glycol 3350 17 Gram(s) Oral daily  senna 2 Tablet(s) Oral at bedtime  sertraline 100 milliGRAM(s) Oral daily    MEDICATIONS  (PRN):  aluminum hydroxide/magnesium hydroxide/simethicone Suspension 30 milliLiter(s) Oral every 4 hours PRN Dyspepsia  melatonin 3 milliGRAM(s) Oral at bedtime PRN Insomnia  morphine  - Injectable 1 milliGRAM(s) IV Push every 4 hours PRN break through pain  ondansetron Injectable 4 milliGRAM(s) IV Push every 8 hours PRN Nausea and/or Vomiting  oxyCODONE    IR 20 milliGRAM(s) Oral every 4 hours PRN Severe Pain (7 - 10)

## 2023-10-31 NOTE — PROGRESS NOTE ADULT - REASON FOR ADMISSION
GPC Bacteremia 2/2 infected L foot wound

## 2023-10-31 NOTE — DISCHARGE NOTE NURSING/CASE MANAGEMENT/SOCIAL WORK - NSSCNAMETXT_GEN_ALL_CORE
Home Care: [facility]   IV Infusion: [facility] ([facility])

## 2023-11-01 VITALS
SYSTOLIC BLOOD PRESSURE: 130 MMHG | OXYGEN SATURATION: 98 % | TEMPERATURE: 98 F | DIASTOLIC BLOOD PRESSURE: 80 MMHG | HEART RATE: 65 BPM | RESPIRATION RATE: 18 BRPM

## 2023-11-01 PROCEDURE — 80053 COMPREHEN METABOLIC PANEL: CPT

## 2023-11-01 PROCEDURE — 83605 ASSAY OF LACTIC ACID: CPT

## 2023-11-01 PROCEDURE — 87640 STAPH A DNA AMP PROBE: CPT

## 2023-11-01 PROCEDURE — 93005 ELECTROCARDIOGRAM TRACING: CPT

## 2023-11-01 PROCEDURE — 86140 C-REACTIVE PROTEIN: CPT

## 2023-11-01 PROCEDURE — 85025 COMPLETE CBC W/AUTO DIFF WBC: CPT

## 2023-11-01 PROCEDURE — 82803 BLOOD GASES ANY COMBINATION: CPT

## 2023-11-01 PROCEDURE — 72158 MRI LUMBAR SPINE W/O & W/DYE: CPT

## 2023-11-01 PROCEDURE — 93923 UPR/LXTR ART STDY 3+ LVLS: CPT

## 2023-11-01 PROCEDURE — 83735 ASSAY OF MAGNESIUM: CPT

## 2023-11-01 PROCEDURE — 84132 ASSAY OF SERUM POTASSIUM: CPT

## 2023-11-01 PROCEDURE — 85014 HEMATOCRIT: CPT

## 2023-11-01 PROCEDURE — 80202 ASSAY OF VANCOMYCIN: CPT

## 2023-11-01 PROCEDURE — 82435 ASSAY OF BLOOD CHLORIDE: CPT

## 2023-11-01 PROCEDURE — 72132 CT LUMBAR SPINE W/DYE: CPT

## 2023-11-01 PROCEDURE — 84100 ASSAY OF PHOSPHORUS: CPT

## 2023-11-01 PROCEDURE — 85018 HEMOGLOBIN: CPT

## 2023-11-01 PROCEDURE — 82330 ASSAY OF CALCIUM: CPT

## 2023-11-01 PROCEDURE — 87641 MR-STAPH DNA AMP PROBE: CPT

## 2023-11-01 PROCEDURE — C8929: CPT

## 2023-11-01 PROCEDURE — 99239 HOSP IP/OBS DSCHRG MGMT >30: CPT | Mod: GC

## 2023-11-01 PROCEDURE — 83036 HEMOGLOBIN GLYCOSYLATED A1C: CPT

## 2023-11-01 PROCEDURE — 84295 ASSAY OF SERUM SODIUM: CPT

## 2023-11-01 PROCEDURE — 85652 RBC SED RATE AUTOMATED: CPT

## 2023-11-01 PROCEDURE — 87040 BLOOD CULTURE FOR BACTERIA: CPT

## 2023-11-01 PROCEDURE — 73620 X-RAY EXAM OF FOOT: CPT

## 2023-11-01 PROCEDURE — 85027 COMPLETE CBC AUTOMATED: CPT

## 2023-11-01 PROCEDURE — 84702 CHORIONIC GONADOTROPIN TEST: CPT

## 2023-11-01 PROCEDURE — 73718 MRI LOWER EXTREMITY W/O DYE: CPT

## 2023-11-01 PROCEDURE — 82947 ASSAY GLUCOSE BLOOD QUANT: CPT

## 2023-11-01 PROCEDURE — 99285 EMERGENCY DEPT VISIT HI MDM: CPT

## 2023-11-01 PROCEDURE — 36415 COLL VENOUS BLD VENIPUNCTURE: CPT

## 2023-11-01 PROCEDURE — 99231 SBSQ HOSP IP/OBS SF/LOW 25: CPT

## 2023-11-01 RX ADMIN — MORPHINE SULFATE 1 MILLIGRAM(S): 50 CAPSULE, EXTENDED RELEASE ORAL at 00:30

## 2023-11-01 RX ADMIN — OXYCODONE HYDROCHLORIDE 20 MILLIGRAM(S): 5 TABLET ORAL at 01:49

## 2023-11-01 RX ADMIN — Medication 100 MILLIGRAM(S): at 06:00

## 2023-11-01 RX ADMIN — OXYCODONE HYDROCHLORIDE 20 MILLIGRAM(S): 5 TABLET ORAL at 02:41

## 2023-11-01 RX ADMIN — OXYCODONE HYDROCHLORIDE 20 MILLIGRAM(S): 5 TABLET ORAL at 06:54

## 2023-11-01 RX ADMIN — MORPHINE SULFATE 1 MILLIGRAM(S): 50 CAPSULE, EXTENDED RELEASE ORAL at 04:45

## 2023-11-01 RX ADMIN — PANTOPRAZOLE SODIUM 40 MILLIGRAM(S): 20 TABLET, DELAYED RELEASE ORAL at 06:00

## 2023-11-01 RX ADMIN — MORPHINE SULFATE 1 MILLIGRAM(S): 50 CAPSULE, EXTENDED RELEASE ORAL at 04:01

## 2023-11-01 RX ADMIN — ENOXAPARIN SODIUM 40 MILLIGRAM(S): 100 INJECTION SUBCUTANEOUS at 05:59

## 2023-11-01 RX ADMIN — Medication 650 MILLIGRAM(S): at 06:54

## 2023-11-01 RX ADMIN — Medication 50 MILLIGRAM(S): at 06:00

## 2023-11-01 RX ADMIN — MORPHINE SULFATE 1 MILLIGRAM(S): 50 CAPSULE, EXTENDED RELEASE ORAL at 08:24

## 2023-11-01 RX ADMIN — OXYCODONE HYDROCHLORIDE 20 MILLIGRAM(S): 5 TABLET ORAL at 05:59

## 2023-11-01 RX ADMIN — Medication 2000 MILLIGRAM(S): at 05:59

## 2023-11-01 RX ADMIN — ONDANSETRON 4 MILLIGRAM(S): 8 TABLET, FILM COATED ORAL at 06:00

## 2023-11-01 RX ADMIN — Medication 650 MILLIGRAM(S): at 06:00

## 2023-11-01 RX ADMIN — Medication 30 MILLIGRAM(S): at 06:01

## 2023-11-01 RX ADMIN — MORPHINE SULFATE 1 MILLIGRAM(S): 50 CAPSULE, EXTENDED RELEASE ORAL at 08:39

## 2023-11-01 NOTE — PROGRESS NOTE ADULT - PROVIDER SPECIALTY LIST ADULT
Infectious Disease
Infectious Disease
Hospitalist
Infectious Disease
Infectious Disease
Internal Medicine
Podiatry
Infectious Disease
Internal Medicine
Internal Medicine
Podiatry
Podiatry

## 2023-11-01 NOTE — PROGRESS NOTE ADULT - SUBJECTIVE AND OBJECTIVE BOX
[facility]  INFECTIOUS DISEASES at [place] / [place] / [place]  =======================================================                               Perry Carreno MD#   Zuleyka Mccabe MD*                             Lizzy Acosta MD*   Barbara Luna MD*                              Professor Emeritus:  Dr Bebeto Mccall MD^            Diplomates American Board of Internal Medicine & Infectious Diseases                # [place] Office - Appt - Tel  [phone] Fax [phone]                * [place] Office - Appt - Tel [phone] Fax [phone]                      ^[place] Office - Tel  [phone] Fax [phone]                                  Hospital Consult line:  [phone]  =======================================================    CLARENCE VITALE [id number]    Follow up: Positive blood cultures    no fevers       Allergies:  penicillin (Unknown)  Milk (Unknown)  amoxicillin (Anaphylaxis)  citrus (Unknown)      REVIEW OF SYSTEMS:  CONSTITUTIONAL:  No Fever or chills  HEENT:  No diplopia or blurred vision.  No earache, sore throat or runny nose.  CARDIOVASCULAR:  No chest pain  RESPIRATORY:  No cough, shortness of breath  GASTROINTESTINAL:  No nausea, vomiting or diarrhea.  GENITOURINARY:  No dysuria, frequency or urgency. No Blood in urine  MUSCULOSKELETAL:  no joint aches, no muscle pain  SKIN:  No change in skin, hair or nails.  NEUROLOGIC:  No Headaches      Physical Exam:  GEN: NAD  HEENT: normocephalic and atraumatic. EOMI. PERRL.    NECK: Supple.   LUNGS: CTA B/L.  HEART: RRR  ABDOMEN: Soft, NT, ND.  +BS.    : No CVA tenderness  EXTREMITIES: Without  edema.  MSK: No joint swelling  NEUROLOGIC: No Focal Deficits   PSYCHIATRIC: Appropriate affect .  SKIN: No rash      Vitals:  T(F): 98.8 (01 Nov 2023 09:26), Max: 98.9 (31 Oct 2023 20:45)  HR: 69 (01 Nov 2023 09:26)  BP: 106/74 (01 Nov 2023 09:26)  RR: 18 (01 Nov 2023 05:03)  SpO2: 100% (01 Nov 2023 09:26) (95% - 100%)  temp max in last 48H T(F): , Max: 98.9 (10-30-23 @ 20:45)    Current Antibiotics:  ceFAZolin  Injectable. 2000 milliGRAM(s) IV Push every 8 hours    Other medications:  acetaminophen     Tablet .. 650 milliGRAM(s) Oral every 8 hours  busPIRone 30 milliGRAM(s) Oral two times a day  enoxaparin Injectable 40 milliGRAM(s) SubCutaneous every 24 hours  hydrALAZINE 50 milliGRAM(s) Oral two times a day  influenza   Vaccine 0.5 milliLiter(s) IntraMuscular once  metoprolol succinate  milliGRAM(s) Oral daily  pantoprazole    Tablet 40 milliGRAM(s) Oral before breakfast  polyethylene glycol 3350 17 Gram(s) Oral daily  senna 2 Tablet(s) Oral at bedtime  sertraline 100 milliGRAM(s) Oral daily      RECENT CULTURES:  10-25 @ 00:20 .Blood Blood-Peripheral     No growth at 5 days    10-25 @ 00:15 .Blood Blood-Peripheral     No growth at 5 days    WBC Count: 5.93 K/uL (10-28-23 @ 07:05)    Creatinine: 0.76 mg/dL (10-28-23 @ 07:05)    C-Reactive Protein, Serum: 11 mg/L (10-30-23 @ 06:15)  C-Reactive Protein, Serum: 18 mg/L (10-26-23 @ 16:29)  C-Reactive Protein, Serum: 5 mg/L (10-25-23 @ 00:20)    Sedimentation Rate, Erythrocyte: 45 mm/hr (10-26-23 @ 16:29)  Sedimentation Rate, Erythrocyte: 40 mm/hr (10-25-23 @ 00:20)               < from: MR Foot No Cont, Left (10.28.23 @ 12:35) >  ACC: [id number] EXAM:  MR FOOT LT   ORDERED BY: RALPH MAYFIELD     PROCEDURE DATE:  10/28/2023      INTERPRETATION:  MR FOOT LEFT    HISTORY: Concern for osteomyelitis.    TECHNIQUE:  Multiplanar MRI of the left forefoot was performed without   contrast.    COMPARISON:  Left foot x-rays dated 10/24/2023.    FINDINGS:    OSSEOUS STRUCTURES    Fractures/Stress Reactions: Nondisplaced transverse fracture involves   the 1st proximal phalanx base..    Marrow:  Soft tissue wound appears to be present along the medial   margin of the 5th metatarsal phalangeal joint with slight subcortical   edema of the metatarsal head but no confluent T1 marrow replacement.  Faint presumed reactive or stress-related marrow edema is noted involving   the partially imaged talar head and navicular.    INTERPHALANGEAL JOINTS    Articular Surfaces:  Preserved.    Effusions/Synovitis:  None.    1ST METATARSOPHALANGEAL JOINT    Articular Surfaces:  Preserved.    Effusions/Synovitis:  None.    Sesamoids:  Intact.    LESSER METATARSOPHALANGEAL JOINTS    Articular Surfaces:  Preserved.    Effusions/Synovitis:  None.    TARSOMETATARSAL JOINTS    Articular Surfaces:  Preserved.    Effusions/Synovitis:  None.    INTERTARSAL JOINTS    Articular Surfaces:  Preserved.    Effusions/Synovitis:  None.    INTERMETATARSAL SPACES    Neuromas:  None.    Bursa:  Slight 1st through 3rd webspace bursa are noted.    LISFRANC LIGAMENT  Intact.    TENDONS    Flexor Tendons:  Intact.    Extensor Tendons:  Intact.    DISTAL PLANTAR FASCIA  Intact.    SOFT TISSUES    Musculature:  There is slight edema of the abductor hallucis muscle and   slight edema extending involving the plantar margin of the abductor   digiti minimi muscle. Changes may be traumatic or neuropathologic    Subcutaneous Tissues:  Mild to moderate scattered subcutis edema is   present. Soft tissue wound appears to be present along the lateral margin   of the 5th metatarsophalangeal joint. No abscess or gas is seen.    IMPRESSION:  1.  Soft tissue wound appearsto be present along the lateral margin of   the 5th metatarsophalangeal joint with slight subcortical edema of the   5th metatarsal head but no T1 marrow replacement. Changes are likely   reactive although early infection cannot be absolutely excluded.  2.  Nondisplaced fracture involves the 1st proximal phalanx base.    < end of copied text >        < from: TTE Echo Complete w/ Contrast w/ Doppler (10.25.23 @ 11:48) >  Summary:   1. Technically difficult study.   2. Mildly enlarged left atrium.   3. Normal wall motion. Left ventricular ejection fraction, by visual   estimation, is 55 to 60%.   4. Normal right atrial size.   5. Normal right ventricular size and function.   6. Mild pulmonic valve regurgitation.   7. There is no evidence of pericardial effusion.   8. No cardiac mass or vegetations visualized. However, TUCKER is a more   sensitive test to evaluate for these findings.    < end of copied text >

## 2023-11-01 NOTE — PROGRESS NOTE ADULT - ASSESSMENT
48-year-old female with a complicated medical h/o L4/5 decompression done on January 13, 2019 at [facility]. At that time she had cauda equina syndrome and was admitted in February 2019 for wound infection from that surgery. She had MSSA growing from the incision site and had a I&D done on February 22, 2019. She was discharged on IV cefazolin which she completed in April 4, 2019. She followed up with us after that time. Stopped following up after April 21 2020. She recently was seen at [facility] emergency room for skin peeling. She was discharged from the emergency room after her labs and work-up was within normal limits. Patient is concerned for any infection since she had a severe infection in 2019 and is traumatized from that, so was seen in the office on 5/16/23 and had a negative work up. Patient was seen in the office on 10/23 with c/o chills and drainage from the left foot. Blood cultures and blood work was obtained in the office. Blood cultures with GPC. Patient was referred to the ER. In the Hospital patient has been afebrile, no leukocytosis. Started on Vancomycin and meropenem.       Positive blood cultures  Chills  Left foot drainage from wound    - Blood cultures in the office on 10/23 with CoNS  (S) Cefazolin  - Repeat blood cultures 10/25 no growth   - XR Foot with no acute findings   - MRI foot, left with no OM  - Podiatry eval noted   - Procalcitonin level in office < 0.03  - CRP 11  - ESR 40  - TTE reporting no veg   - Continue Cefazolin   - Plan for antibiotics till 11/7/23  - s/p  midline   - Appointment with us in 7 to 10 days - [phone]  - Trend Fever  - Trend WBC      Will Follow    Discussed treatment plan with: Dr Keys

## 2023-11-02 ENCOUNTER — INPATIENT (INPATIENT)
Facility: HOSPITAL | Age: 48
LOS: 4 days | Discharge: HOME | DRG: 315 | End: 2023-11-07
Admitting: STUDENT IN AN ORGANIZED HEALTH CARE EDUCATION/TRAINING PROGRAM
Payer: COMMERCIAL

## 2023-11-02 VITALS
TEMPERATURE: 98 F | HEIGHT: 63 IN | DIASTOLIC BLOOD PRESSURE: 84 MMHG | HEART RATE: 80 BPM | OXYGEN SATURATION: 100 % | RESPIRATION RATE: 18 BRPM | SYSTOLIC BLOOD PRESSURE: 133 MMHG | WEIGHT: 166.67 LBS

## 2023-11-02 DIAGNOSIS — Z90.49 ACQUIRED ABSENCE OF OTHER SPECIFIED PARTS OF DIGESTIVE TRACT: Chronic | ICD-10-CM

## 2023-11-02 DIAGNOSIS — Z98.890 OTHER SPECIFIED POSTPROCEDURAL STATES: Chronic | ICD-10-CM

## 2023-11-02 PROCEDURE — 36000 PLACE NEEDLE IN VEIN: CPT

## 2023-11-02 PROCEDURE — 99285 EMERGENCY DEPT VISIT HI MDM: CPT | Mod: 25

## 2023-11-02 RX ORDER — CEFAZOLIN SODIUM 1 G
2000 VIAL (EA) INJECTION ONCE
Refills: 0 | Status: COMPLETED | OUTPATIENT
Start: 2023-11-02 | End: 2023-11-02

## 2023-11-02 NOTE — ED ADULT TRIAGE NOTE - CHIEF COMPLAINT QUOTE
pt ambulatory to triage with walker with complaints of R midline falling out. pt states today was her first dressing change with visiting RN and now midline is falling out of arm per pt. pt states she has midline s/p back surgery for abx. pt reports pain and burning around midline site. pt states she tried to flush midline and states the midline did not flush.

## 2023-11-02 NOTE — ED PROVIDER NOTE - PHYSICAL EXAMINATION
Gen: Well appearing in NAD  Head: NC/AT  Neck: trachea midline  Resp:  No distress  Abd: soft, nd, nt  Ext: no deformities +RIGHT UPPER ARM w/ PARTIALLY DISLODGED MIDLINE CATHETER. SURROUNDING ERYTHEMA.   Neuro:  A&O appears non focal  Skin:  Warm and dry as visualized  Psych:  Normal affect and mood

## 2023-11-02 NOTE — ED PROVIDER NOTE - OBJECTIVE STATEMENT
CLARENCE VITALE is a 47yo Female with PMH HTN, DM, chronic back pain who presents c/o "my midline came out". PT was discharged two days ago on Cefazolin 2g Q8 for bacteremia 2/2 foot infection. PT to be on outpatient IV abx until 11/7. She states that she got her dose this morning but this afternoon she noticed the midline was coming out and she was unable to flush the catheter. PT complains of pain in the arm. Denies fevers.

## 2023-11-02 NOTE — ED PROVIDER NOTE - CLINICAL SUMMARY MEDICAL DECISION MAKING FREE TEXT BOX
ASSESSMENT:   CLARENCE VITALE is a 47yo F who presented after MIDLINE was dislodged which she is using for IV abx for bacteremia.     PLAN: IV abx. Obs for Midline replacement in AM.

## 2023-11-02 NOTE — ED PROVIDER NOTE - ATTENDING CONTRIBUTION TO CARE
47yo Female with PMH HTN, DM, chronic back pain who has a midline due to foot infection. She presents with complaints of not able to infuse her midline and increase pain to the area. Patient attempted to use her midline this evening and noted difficulty infusion and found that there was a kink in the line.     I personally saw the patient with the resident, and completed the key components of the history and physical exam. I then discussed the management plan with the resident.

## 2023-11-03 DIAGNOSIS — T82.524A DISPLACEMENT OF INFUSION CATHETER, INITIAL ENCOUNTER: ICD-10-CM

## 2023-11-03 LAB
ALBUMIN SERPL ELPH-MCNC: 4.6 G/DL — SIGNIFICANT CHANGE UP (ref 3.3–5.2)
ALBUMIN SERPL ELPH-MCNC: 4.6 G/DL — SIGNIFICANT CHANGE UP (ref 3.3–5.2)
ALP SERPL-CCNC: 66 U/L — SIGNIFICANT CHANGE UP (ref 40–120)
ALP SERPL-CCNC: 66 U/L — SIGNIFICANT CHANGE UP (ref 40–120)
ALT FLD-CCNC: <5 U/L — SIGNIFICANT CHANGE UP
ALT FLD-CCNC: <5 U/L — SIGNIFICANT CHANGE UP
ANION GAP SERPL CALC-SCNC: 13 MMOL/L — SIGNIFICANT CHANGE UP (ref 5–17)
ANION GAP SERPL CALC-SCNC: 13 MMOL/L — SIGNIFICANT CHANGE UP (ref 5–17)
AST SERPL-CCNC: 23 U/L — SIGNIFICANT CHANGE UP
AST SERPL-CCNC: 23 U/L — SIGNIFICANT CHANGE UP
BASOPHILS # BLD AUTO: 0.05 K/UL — SIGNIFICANT CHANGE UP (ref 0–0.2)
BASOPHILS # BLD AUTO: 0.05 K/UL — SIGNIFICANT CHANGE UP (ref 0–0.2)
BASOPHILS NFR BLD AUTO: 0.8 % — SIGNIFICANT CHANGE UP (ref 0–2)
BASOPHILS NFR BLD AUTO: 0.8 % — SIGNIFICANT CHANGE UP (ref 0–2)
BILIRUB SERPL-MCNC: 0.4 MG/DL — SIGNIFICANT CHANGE UP (ref 0.4–2)
BILIRUB SERPL-MCNC: 0.4 MG/DL — SIGNIFICANT CHANGE UP (ref 0.4–2)
BUN SERPL-MCNC: 10 MG/DL — SIGNIFICANT CHANGE UP (ref 8–20)
BUN SERPL-MCNC: 10 MG/DL — SIGNIFICANT CHANGE UP (ref 8–20)
CALCIUM SERPL-MCNC: 9.7 MG/DL — SIGNIFICANT CHANGE UP (ref 8.4–10.5)
CALCIUM SERPL-MCNC: 9.7 MG/DL — SIGNIFICANT CHANGE UP (ref 8.4–10.5)
CHLORIDE SERPL-SCNC: 96 MMOL/L — SIGNIFICANT CHANGE UP (ref 96–108)
CHLORIDE SERPL-SCNC: 96 MMOL/L — SIGNIFICANT CHANGE UP (ref 96–108)
CO2 SERPL-SCNC: 28 MMOL/L — SIGNIFICANT CHANGE UP (ref 22–29)
CO2 SERPL-SCNC: 28 MMOL/L — SIGNIFICANT CHANGE UP (ref 22–29)
CREAT SERPL-MCNC: 0.65 MG/DL — SIGNIFICANT CHANGE UP (ref 0.5–1.3)
CREAT SERPL-MCNC: 0.65 MG/DL — SIGNIFICANT CHANGE UP (ref 0.5–1.3)
CRP SERPL-MCNC: 10 MG/L — HIGH
CRP SERPL-MCNC: 10 MG/L — HIGH
EGFR: 110 ML/MIN/1.73M2 — SIGNIFICANT CHANGE UP
EGFR: 110 ML/MIN/1.73M2 — SIGNIFICANT CHANGE UP
EOSINOPHIL # BLD AUTO: 0.12 K/UL — SIGNIFICANT CHANGE UP (ref 0–0.5)
EOSINOPHIL # BLD AUTO: 0.12 K/UL — SIGNIFICANT CHANGE UP (ref 0–0.5)
EOSINOPHIL NFR BLD AUTO: 2 % — SIGNIFICANT CHANGE UP (ref 0–6)
EOSINOPHIL NFR BLD AUTO: 2 % — SIGNIFICANT CHANGE UP (ref 0–6)
ERYTHROCYTE [SEDIMENTATION RATE] IN BLOOD: 46 MM/HR — HIGH (ref 0–20)
ERYTHROCYTE [SEDIMENTATION RATE] IN BLOOD: 46 MM/HR — HIGH (ref 0–20)
GLUCOSE SERPL-MCNC: 93 MG/DL — SIGNIFICANT CHANGE UP (ref 70–99)
GLUCOSE SERPL-MCNC: 93 MG/DL — SIGNIFICANT CHANGE UP (ref 70–99)
HCT VFR BLD CALC: 41.5 % — SIGNIFICANT CHANGE UP (ref 34.5–45)
HCT VFR BLD CALC: 41.5 % — SIGNIFICANT CHANGE UP (ref 34.5–45)
HGB BLD-MCNC: 14.3 G/DL — SIGNIFICANT CHANGE UP (ref 11.5–15.5)
HGB BLD-MCNC: 14.3 G/DL — SIGNIFICANT CHANGE UP (ref 11.5–15.5)
IMM GRANULOCYTES NFR BLD AUTO: 0.2 % — SIGNIFICANT CHANGE UP (ref 0–0.9)
IMM GRANULOCYTES NFR BLD AUTO: 0.2 % — SIGNIFICANT CHANGE UP (ref 0–0.9)
LYMPHOCYTES # BLD AUTO: 2.32 K/UL — SIGNIFICANT CHANGE UP (ref 1–3.3)
LYMPHOCYTES # BLD AUTO: 2.32 K/UL — SIGNIFICANT CHANGE UP (ref 1–3.3)
LYMPHOCYTES # BLD AUTO: 38.4 % — SIGNIFICANT CHANGE UP (ref 13–44)
LYMPHOCYTES # BLD AUTO: 38.4 % — SIGNIFICANT CHANGE UP (ref 13–44)
MCHC RBC-ENTMCNC: 31.4 PG — SIGNIFICANT CHANGE UP (ref 27–34)
MCHC RBC-ENTMCNC: 31.4 PG — SIGNIFICANT CHANGE UP (ref 27–34)
MCHC RBC-ENTMCNC: 34.5 GM/DL — SIGNIFICANT CHANGE UP (ref 32–36)
MCHC RBC-ENTMCNC: 34.5 GM/DL — SIGNIFICANT CHANGE UP (ref 32–36)
MCV RBC AUTO: 91 FL — SIGNIFICANT CHANGE UP (ref 80–100)
MCV RBC AUTO: 91 FL — SIGNIFICANT CHANGE UP (ref 80–100)
MONOCYTES # BLD AUTO: 0.59 K/UL — SIGNIFICANT CHANGE UP (ref 0–0.9)
MONOCYTES # BLD AUTO: 0.59 K/UL — SIGNIFICANT CHANGE UP (ref 0–0.9)
MONOCYTES NFR BLD AUTO: 9.8 % — SIGNIFICANT CHANGE UP (ref 2–14)
MONOCYTES NFR BLD AUTO: 9.8 % — SIGNIFICANT CHANGE UP (ref 2–14)
NEUTROPHILS # BLD AUTO: 2.95 K/UL — SIGNIFICANT CHANGE UP (ref 1.8–7.4)
NEUTROPHILS # BLD AUTO: 2.95 K/UL — SIGNIFICANT CHANGE UP (ref 1.8–7.4)
NEUTROPHILS NFR BLD AUTO: 48.8 % — SIGNIFICANT CHANGE UP (ref 43–77)
NEUTROPHILS NFR BLD AUTO: 48.8 % — SIGNIFICANT CHANGE UP (ref 43–77)
PLATELET # BLD AUTO: 249 K/UL — SIGNIFICANT CHANGE UP (ref 150–400)
PLATELET # BLD AUTO: 249 K/UL — SIGNIFICANT CHANGE UP (ref 150–400)
POTASSIUM SERPL-MCNC: 3.8 MMOL/L — SIGNIFICANT CHANGE UP (ref 3.5–5.3)
POTASSIUM SERPL-MCNC: 3.8 MMOL/L — SIGNIFICANT CHANGE UP (ref 3.5–5.3)
POTASSIUM SERPL-SCNC: 3.8 MMOL/L — SIGNIFICANT CHANGE UP (ref 3.5–5.3)
POTASSIUM SERPL-SCNC: 3.8 MMOL/L — SIGNIFICANT CHANGE UP (ref 3.5–5.3)
PROT SERPL-MCNC: 8.2 G/DL — SIGNIFICANT CHANGE UP (ref 6.6–8.7)
PROT SERPL-MCNC: 8.2 G/DL — SIGNIFICANT CHANGE UP (ref 6.6–8.7)
RBC # BLD: 4.56 M/UL — SIGNIFICANT CHANGE UP (ref 3.8–5.2)
RBC # BLD: 4.56 M/UL — SIGNIFICANT CHANGE UP (ref 3.8–5.2)
RBC # FLD: 14.6 % — HIGH (ref 10.3–14.5)
RBC # FLD: 14.6 % — HIGH (ref 10.3–14.5)
SODIUM SERPL-SCNC: 137 MMOL/L — SIGNIFICANT CHANGE UP (ref 135–145)
SODIUM SERPL-SCNC: 137 MMOL/L — SIGNIFICANT CHANGE UP (ref 135–145)
WBC # BLD: 6.04 K/UL — SIGNIFICANT CHANGE UP (ref 3.8–10.5)
WBC # BLD: 6.04 K/UL — SIGNIFICANT CHANGE UP (ref 3.8–10.5)
WBC # FLD AUTO: 6.04 K/UL — SIGNIFICANT CHANGE UP (ref 3.8–10.5)
WBC # FLD AUTO: 6.04 K/UL — SIGNIFICANT CHANGE UP (ref 3.8–10.5)

## 2023-11-03 PROCEDURE — 99236 HOSP IP/OBS SAME DATE HI 85: CPT

## 2023-11-03 PROCEDURE — 99223 1ST HOSP IP/OBS HIGH 75: CPT

## 2023-11-03 RX ORDER — HYDRALAZINE HCL 50 MG
50 TABLET ORAL
Refills: 0 | Status: DISCONTINUED | OUTPATIENT
Start: 2023-11-03 | End: 2023-11-03

## 2023-11-03 RX ORDER — METFORMIN HYDROCHLORIDE 850 MG/1
1000 TABLET ORAL DAILY
Refills: 0 | Status: DISCONTINUED | OUTPATIENT
Start: 2023-11-03 | End: 2023-11-03

## 2023-11-03 RX ORDER — METOPROLOL TARTRATE 50 MG
100 TABLET ORAL DAILY
Refills: 0 | Status: DISCONTINUED | OUTPATIENT
Start: 2023-11-03 | End: 2023-11-07

## 2023-11-03 RX ORDER — HYDRALAZINE HCL 50 MG
50 TABLET ORAL
Refills: 0 | Status: DISCONTINUED | OUTPATIENT
Start: 2023-11-03 | End: 2023-11-07

## 2023-11-03 RX ORDER — CEFAZOLIN SODIUM 1 G
2000 VIAL (EA) INJECTION EVERY 8 HOURS
Refills: 0 | Status: COMPLETED | OUTPATIENT
Start: 2023-11-03 | End: 2023-11-07

## 2023-11-03 RX ORDER — POLYETHYLENE GLYCOL 3350 17 G/17G
17 POWDER, FOR SOLUTION ORAL DAILY
Refills: 0 | Status: DISCONTINUED | OUTPATIENT
Start: 2023-11-03 | End: 2023-11-07

## 2023-11-03 RX ORDER — LANOLIN ALCOHOL/MO/W.PET/CERES
3 CREAM (GRAM) TOPICAL AT BEDTIME
Refills: 0 | Status: DISCONTINUED | OUTPATIENT
Start: 2023-11-03 | End: 2023-11-07

## 2023-11-03 RX ORDER — OXYCODONE HYDROCHLORIDE 5 MG/1
20 TABLET ORAL EVERY 4 HOURS
Refills: 0 | Status: DISCONTINUED | OUTPATIENT
Start: 2023-11-03 | End: 2023-11-07

## 2023-11-03 RX ORDER — CEFAZOLIN SODIUM 1 G
2000 VIAL (EA) INJECTION EVERY 8 HOURS
Refills: 0 | Status: DISCONTINUED | OUTPATIENT
Start: 2023-11-03 | End: 2023-11-03

## 2023-11-03 RX ORDER — SERTRALINE 25 MG/1
100 TABLET, FILM COATED ORAL DAILY
Refills: 0 | Status: COMPLETED | OUTPATIENT
Start: 2023-11-03 | End: 2023-11-04

## 2023-11-03 RX ORDER — MORPHINE SULFATE 50 MG/1
8 CAPSULE, EXTENDED RELEASE ORAL ONCE
Refills: 0 | Status: DISCONTINUED | OUTPATIENT
Start: 2023-11-03 | End: 2023-11-03

## 2023-11-03 RX ORDER — ACETAMINOPHEN 500 MG
650 TABLET ORAL EVERY 6 HOURS
Refills: 0 | Status: DISCONTINUED | OUTPATIENT
Start: 2023-11-03 | End: 2023-11-07

## 2023-11-03 RX ORDER — ENOXAPARIN SODIUM 100 MG/ML
40 INJECTION SUBCUTANEOUS EVERY 24 HOURS
Refills: 0 | Status: DISCONTINUED | OUTPATIENT
Start: 2023-11-03 | End: 2023-11-07

## 2023-11-03 RX ORDER — OXYCODONE HYDROCHLORIDE 5 MG/1
15 TABLET ORAL
Refills: 0 | Status: DISCONTINUED | OUTPATIENT
Start: 2023-11-03 | End: 2023-11-05

## 2023-11-03 RX ORDER — PANTOPRAZOLE SODIUM 20 MG/1
40 TABLET, DELAYED RELEASE ORAL
Refills: 0 | Status: DISCONTINUED | OUTPATIENT
Start: 2023-11-03 | End: 2023-11-07

## 2023-11-03 RX ORDER — ACETAMINOPHEN 500 MG
1000 TABLET ORAL ONCE
Refills: 0 | Status: COMPLETED | OUTPATIENT
Start: 2023-11-03 | End: 2023-11-03

## 2023-11-03 RX ORDER — OXYCODONE HYDROCHLORIDE 5 MG/1
15 TABLET ORAL EVERY 4 HOURS
Refills: 0 | Status: DISCONTINUED | OUTPATIENT
Start: 2023-11-03 | End: 2023-11-03

## 2023-11-03 RX ORDER — ONDANSETRON 8 MG/1
4 TABLET, FILM COATED ORAL EVERY 8 HOURS
Refills: 0 | Status: DISCONTINUED | OUTPATIENT
Start: 2023-11-03 | End: 2023-11-07

## 2023-11-03 RX ORDER — ONDANSETRON 8 MG/1
4 TABLET, FILM COATED ORAL
Refills: 0 | Status: DISCONTINUED | OUTPATIENT
Start: 2023-11-03 | End: 2023-11-07

## 2023-11-03 RX ORDER — SENNA PLUS 8.6 MG/1
2 TABLET ORAL AT BEDTIME
Refills: 0 | Status: DISCONTINUED | OUTPATIENT
Start: 2023-11-03 | End: 2023-11-07

## 2023-11-03 RX ORDER — OXYCODONE HYDROCHLORIDE 5 MG/1
20 TABLET ORAL ONCE
Refills: 0 | Status: DISCONTINUED | OUTPATIENT
Start: 2023-11-03 | End: 2023-11-03

## 2023-11-03 RX ADMIN — Medication 1000 MILLIGRAM(S): at 01:15

## 2023-11-03 RX ADMIN — ENOXAPARIN SODIUM 40 MILLIGRAM(S): 100 INJECTION SUBCUTANEOUS at 16:52

## 2023-11-03 RX ADMIN — Medication 2000 MILLIGRAM(S): at 16:45

## 2023-11-03 RX ADMIN — SERTRALINE 100 MILLIGRAM(S): 25 TABLET, FILM COATED ORAL at 03:10

## 2023-11-03 RX ADMIN — Medication 1000 MILLIGRAM(S): at 01:16

## 2023-11-03 RX ADMIN — Medication 50 MILLIGRAM(S): at 16:55

## 2023-11-03 RX ADMIN — Medication 100 MILLIGRAM(S): at 06:06

## 2023-11-03 RX ADMIN — OXYCODONE HYDROCHLORIDE 20 MILLIGRAM(S): 5 TABLET ORAL at 05:35

## 2023-11-03 RX ADMIN — MORPHINE SULFATE 8 MILLIGRAM(S): 50 CAPSULE, EXTENDED RELEASE ORAL at 01:08

## 2023-11-03 RX ADMIN — Medication 30 MILLIGRAM(S): at 16:55

## 2023-11-03 RX ADMIN — OXYCODONE HYDROCHLORIDE 20 MILLIGRAM(S): 5 TABLET ORAL at 22:27

## 2023-11-03 RX ADMIN — Medication 400 MILLIGRAM(S): at 01:04

## 2023-11-03 RX ADMIN — MORPHINE SULFATE 8 MILLIGRAM(S): 50 CAPSULE, EXTENDED RELEASE ORAL at 02:46

## 2023-11-03 RX ADMIN — Medication 50 MILLIGRAM(S): at 06:06

## 2023-11-03 RX ADMIN — OXYCODONE HYDROCHLORIDE 20 MILLIGRAM(S): 5 TABLET ORAL at 04:31

## 2023-11-03 RX ADMIN — Medication 30 MILLIGRAM(S): at 06:06

## 2023-11-03 RX ADMIN — Medication 2000 MILLIGRAM(S): at 22:32

## 2023-11-03 RX ADMIN — OXYCODONE HYDROCHLORIDE 20 MILLIGRAM(S): 5 TABLET ORAL at 16:30

## 2023-11-03 RX ADMIN — OXYCODONE HYDROCHLORIDE 15 MILLIGRAM(S): 5 TABLET ORAL at 12:34

## 2023-11-03 RX ADMIN — OXYCODONE HYDROCHLORIDE 15 MILLIGRAM(S): 5 TABLET ORAL at 13:50

## 2023-11-03 RX ADMIN — OXYCODONE HYDROCHLORIDE 20 MILLIGRAM(S): 5 TABLET ORAL at 15:39

## 2023-11-03 RX ADMIN — Medication 2000 MILLIGRAM(S): at 01:08

## 2023-11-03 RX ADMIN — OXYCODONE HYDROCHLORIDE 15 MILLIGRAM(S): 5 TABLET ORAL at 18:36

## 2023-11-03 RX ADMIN — ONDANSETRON 4 MILLIGRAM(S): 8 TABLET, FILM COATED ORAL at 09:30

## 2023-11-03 RX ADMIN — Medication 2000 MILLIGRAM(S): at 09:18

## 2023-11-03 NOTE — ED CDU PROVIDER DISPOSITION NOTE - ATTENDING CONTRIBUTION TO CARE
I agree with the PA's note and was available for any issues/concerns. I was directly involved in patient care. My brief overall assessment is as follows: pmh as documented, here for midline replacement. iv team consulted, but given pt has blood cultures pending unable to place line until cultures resulted per IV team. pt to be admitted for culture r/o.

## 2023-11-03 NOTE — H&P ADULT - ASSESSMENT
48F with PMHX Cauda Equina Syndrome, Chronic Back Pain, Hx DM2 (no longer on meds), HTN, MDD, Anxiety, Chronic Opiate Use, Tobacco Abuse admitted for [facility]PC Bacteremia 2/2 Infected L Foot Wound.    #Blood cultures in the office on 10/23 with CoNS- Staph hominis  #L foot wound  - Admit to medicine  - Follow up repeat blood culture      Acute on Chronic Back Pain- controlled  - On Oxycodone IR 15mg q4h PRN; lidocaine  - istop checked- was on 15mg Q 6 hrs. since c/o acute pain will maintain Q4 inhouse   - after much d/w pt, increasing oxy to 20 Q 4 and making tylenol standing. she states that she consulted with her own outside doctors who recommended not to use lidocaine so she is refusing lidocaine      HTN controlled  - Metoprolol Succinate 100mg q24  - Hydralazine 50mg BID    MDD, Anxiety  - Zoloft 100mg q24  - Buspar 30mg BID    GERD  - Nexium 40mg q24    Hx DM2  - A1C: 6  - Reportedly no longer diabetic/ meds s/p weight loss    VTE PPX   -SCD/LMWH 40 q24  Dispo- home with iv antibx 48F with PMHX Cauda Equina Syndrome, Chronic Back Pain, Hx DM2 (no longer on meds), HTN, MDD, Anxiety, Chronic Opiate Use, Tobacco Abuse admitted for [facility]PC Bacteremia 2/2 Infected L Foot Wound recently and discharged home on IV antibiotics re-admitted due to displaced midline and need for continuous IV antibiotics    #Blood cultures in the office on 10/23 with CoNS- Staph hominis  #L foot wound  - Admit to medicine  - Follow up repeat blood cultures  - Vascular note appreciated  - Spoke with ID, will continue with current dose and duration of abx  - Monitor fever/leukocytosis.      #Acute on Chronic Back Pain- controlled  #Chronic Cauda Equina Syndrome  - On Oxycodone IR 20mg q4h PRN  - Usually takes oxycodone IR 15mg Q6H as per Istop      #HTN controlled  - Metoprolol Succinate 100mg q24  - Hydralazine 50mg BID    #MDD, Anxiety  - Zoloft 100mg q24  - Buspar 30mg BID    #GERD  - Omeprazole 40mg q24    #Hx DM2  - A1C: 6  - Reportedly no longer diabetic/ meds s/p weight loss    VTE PPX  -SCD/LMWH 40 q24    Dispo-Acute. home with iv antibx after blood cultures negative and midline placed.

## 2023-11-03 NOTE — PATIENT PROFILE ADULT - FALL HARM RISK - HARM RISK INTERVENTIONS

## 2023-11-03 NOTE — ED ADULT NURSE NOTE - OBJECTIVE STATEMENT
pt presents to ed for midline coming out.  pt s/p back surgery and has midline placement for iv abx.  pt ambulatory with walker, LLE paralyzed from back surgery.  NAD at this time.

## 2023-11-03 NOTE — H&P ADULT - NSHPLABSRESULTS_GEN_ALL_CORE
Vital Signs Last 24 Hrs  T(C): 36.7 (03 Nov 2023 09:02), Max: 36.9 (02 Nov 2023 22:38)  T(F): 98 (03 Nov 2023 09:02), Max: 98.5 (02 Nov 2023 22:38)  HR: 68 (03 Nov 2023 09:02) (68 - 80)  BP: 130/68 (03 Nov 2023 09:02) (126/82 - 133/84)  BP(mean): 98 (03 Nov 2023 04:26) (98 - 98)  RR: 18 (03 Nov 2023 09:02) (16 - 18)  SpO2: 98% (03 Nov 2023 09:02) (98% - 100%)    Parameters below as of 03 Nov 2023 09:02  Patient On (Oxygen Delivery Method): room air    LABS:                        14.3   6.04  )-----------( 249      ( 03 Nov 2023 00:55 )             41.5     11-03    137  |  96  |  10.0  ----------------------------<  93  3.8   |  28.0  |  0.65    Ca    9.7      03 Nov 2023 00:55    TPro  8.2  /  Alb  4.6  /  TBili  0.4  /  DBili  x   /  AST  23  /  ALT  <5  /  AlkPhos  66  11-03          Urinalysis Basic - ( 03 Nov 2023 00:55 )    Color: x / Appearance: x / SG: x / pH: x  Gluc: 93 mg/dL / Ketone: x  / Bili: x / Urobili: x   Blood: x / Protein: x / Nitrite: x   Leuk Esterase: x / RBC: x / WBC x   Sq Epi: x / Non Sq Epi: x / Bacteria: x

## 2023-11-03 NOTE — ED CDU PROVIDER INITIAL DAY NOTE - CLINICAL SUMMARY MEDICAL DECISION MAKING FREE TEXT BOX
ASSESSMENT:   CLARENCE VITALE is a 49yo F who presented after MIDLINE was dislodged which she is using for IV abx for bacteremia.     PLAN: IV abx. Obs for Midline replacement in AM.

## 2023-11-03 NOTE — ED ADULT NURSE REASSESSMENT NOTE - NS ED NURSE REASSESS COMMENT FT1
pt states they take 20mg of oxy Q4hrs @ home. JACE INMAN states she confirmed with pt's pharmacy the dose of 15mg of oxy Q6hrs. 15mg oxy administered. pt requesting another 5mg of oxy. JCAE INMAN called to bedside.
C/p pt assumed from Kylie RN @[id number]. no S&S of acute distress, pt resting comfortably on stretcher. pt does not offer acute complaints. midline presents to RUE "my nurse was changing the dressing and pulled it out" pt states. c/o burning sensation to RUE. plan of care discussed with pt and case management Dheeraj. awaiting possible midline replacement. plan of care reviewed with pt. pt in understanding of plan of care.

## 2023-11-03 NOTE — ED ADULT NURSE REASSESSMENT NOTE - NSFALLRISKINTERV_ED_ALL_ED
Assistance OOB with selected safe patient handling equipment if applicable/Assistance with ambulation/Communicate fall risk and risk factors to all staff, patient, and family/Monitor gait and stability/Provide patient with walking aids/Provide visual cue: yellow wristband, yellow gown, etc/Reinforce activity limits and safety measures with patient and family/Use of alarms - bed, stretcher, chair and/or video monitoring/Call bell, personal items and telephone in reach/Instruct patient to call for assistance before getting out of bed/chair/stretcher/Non-slip footwear applied when patient is off stretcher/[place] to call system/Physically safe environment - no spills, clutter or unnecessary equipment/Purposeful Proactive Rounding/Room/bathroom lighting operational, light cord in reach

## 2023-11-03 NOTE — CHART NOTE - NSCHARTNOTEFT_GEN_A_CORE
47yo Female with PMH HTN, DM, chronic back pain. PT was discharged two days ago on Cefazolin 2g Q8 for bacteremia 2/2 foot infection. Pt with negative blood cultures from 10/25/23 and midline placement on 10/31/23.  PT to be on outpatient Cefazolin IV abx until 11/7/2023. Patient returned to ED due to dislodged midline catheter. ED team ordered repeat blood cultures.     PLAN:   * Due to repeat blood cultures pending will have to defer new midline placement until repeat Bcx result with no growth.

## 2023-11-03 NOTE — H&P ADULT - NSHPPHYSICALEXAM_GEN_ALL_CORE
PHYSICAL EXAM-  GENERAL: comfortable, not in distress  HEAD:  Atraumatic, Normocephalic  EYES: EOMI, PERRLA, conjunctiva and sclera clear  ENT:  Moist mucous membranes, No lesions  NECK: Supple, No JVD, Normal thyroid  NERVOUS SYSTEM:  Alert & Oriented X 3, Motor Strength 5/5 B/L upper and lower extremities  CHEST/LUNG: CTA bilaterally; No rales, rhonchi, wheezing, or rubs  HEART: Regular rate and rhythm; No murmurs, rubs, or gallops  ABDOMEN: Soft, Nontender, Nondistended; Bowel sounds present  EXTREMITIES:  2+ Peripheral Pulses, No clubbing, cyanosis, or edema  SKIN: No rashes or lesions

## 2023-11-03 NOTE — ED CDU PROVIDER DISPOSITION NOTE - CLINICAL COURSE
Patient requiring midline due to dislodgement and unable to have device inserted due to blood cultures pending 48 hour possible growth.  Requires antibiotic Cefalazolin q 8.  Case d/w MD Rivas for admission.

## 2023-11-03 NOTE — ED CDU PROVIDER INITIAL DAY NOTE - NS ED ATTENDING STATEMENT MOD
This was a shared visit with the JEEVAN. I reviewed and verified the documentation and independently performed the documented:

## 2023-11-03 NOTE — H&P ADULT - HISTORY OF PRESENT ILLNESS
48F with PMHX Cauda Equina Syndrome, Chronic Back Pain, Hx DM2 (no longer on meds), HTN, MDD, Anxiety, Chronic Opiate Use, Tobacco Abuse presents to ED for displaced midline catheter and need for continuous antibiotics. Patient endorses that her dressing was being changed by nurse yesterday and her midline afterwards was not flushing. She had already received 1 morning dose of her Cefazolin. She was recently discharged from [facility] from [facility] bacteremia due to infected L foot wound and was sent on Cefazolin 2g Q8H. ED obtained blood cultures and vascular surgery endorsed that blood cultures would need to be negative for replacement of midline catheter. She denies any fevers, chills, sob, chest pain, abdominal pain, dizziness. Endorses chronic back and neck pain relieved with her pain medications. Patient is admitted for midline displacement and need for continuous antibiotics.

## 2023-11-04 LAB
GLUCOSE BLDC GLUCOMTR-MCNC: 95 MG/DL — SIGNIFICANT CHANGE UP (ref 70–99)
GLUCOSE BLDC GLUCOMTR-MCNC: 95 MG/DL — SIGNIFICANT CHANGE UP (ref 70–99)

## 2023-11-04 PROCEDURE — 99232 SBSQ HOSP IP/OBS MODERATE 35: CPT

## 2023-11-04 RX ORDER — ACETAMINOPHEN 500 MG
1000 TABLET ORAL ONCE
Refills: 0 | Status: COMPLETED | OUTPATIENT
Start: 2023-11-04 | End: 2023-11-04

## 2023-11-04 RX ADMIN — Medication 2000 MILLIGRAM(S): at 20:48

## 2023-11-04 RX ADMIN — OXYCODONE HYDROCHLORIDE 15 MILLIGRAM(S): 5 TABLET ORAL at 17:22

## 2023-11-04 RX ADMIN — SERTRALINE 100 MILLIGRAM(S): 25 TABLET, FILM COATED ORAL at 12:55

## 2023-11-04 RX ADMIN — Medication 50 MILLIGRAM(S): at 17:23

## 2023-11-04 RX ADMIN — OXYCODONE HYDROCHLORIDE 15 MILLIGRAM(S): 5 TABLET ORAL at 13:30

## 2023-11-04 RX ADMIN — Medication 2000 MILLIGRAM(S): at 13:39

## 2023-11-04 RX ADMIN — OXYCODONE HYDROCHLORIDE 15 MILLIGRAM(S): 5 TABLET ORAL at 06:00

## 2023-11-04 RX ADMIN — Medication 30 MILLIGRAM(S): at 06:01

## 2023-11-04 RX ADMIN — Medication 2000 MILLIGRAM(S): at 06:01

## 2023-11-04 RX ADMIN — OXYCODONE HYDROCHLORIDE 20 MILLIGRAM(S): 5 TABLET ORAL at 11:00

## 2023-11-04 RX ADMIN — OXYCODONE HYDROCHLORIDE 15 MILLIGRAM(S): 5 TABLET ORAL at 00:14

## 2023-11-04 RX ADMIN — OXYCODONE HYDROCHLORIDE 20 MILLIGRAM(S): 5 TABLET ORAL at 02:09

## 2023-11-04 RX ADMIN — Medication 100 MILLIGRAM(S): at 06:02

## 2023-11-04 RX ADMIN — ENOXAPARIN SODIUM 40 MILLIGRAM(S): 100 INJECTION SUBCUTANEOUS at 17:22

## 2023-11-04 RX ADMIN — Medication 30 MILLIGRAM(S): at 17:22

## 2023-11-04 RX ADMIN — OXYCODONE HYDROCHLORIDE 20 MILLIGRAM(S): 5 TABLET ORAL at 10:15

## 2023-11-04 RX ADMIN — OXYCODONE HYDROCHLORIDE 15 MILLIGRAM(S): 5 TABLET ORAL at 12:55

## 2023-11-04 RX ADMIN — Medication 50 MILLIGRAM(S): at 06:01

## 2023-11-04 RX ADMIN — PANTOPRAZOLE SODIUM 40 MILLIGRAM(S): 20 TABLET, DELAYED RELEASE ORAL at 07:49

## 2023-11-04 RX ADMIN — ONDANSETRON 4 MILLIGRAM(S): 8 TABLET, FILM COATED ORAL at 06:00

## 2023-11-04 RX ADMIN — OXYCODONE HYDROCHLORIDE 20 MILLIGRAM(S): 5 TABLET ORAL at 20:49

## 2023-11-04 NOTE — PROGRESS NOTE ADULT - SUBJECTIVE AND OBJECTIVE BOX
INTERVAL HPI/OVERNIGHT EVENTS:    CC: recent bacteremia, dislodged midline, chronic opioid use    Chart and course reviewed.  States she has chronic pain      Vital Signs Last 24 Hrs  T(C): 36.7 (04 Nov 2023 10:52), Max: 37.2 (03 Nov 2023 15:22)  T(F): 98 (04 Nov 2023 10:52), Max: 99 (03 Nov 2023 15:22)  HR: 67 (04 Nov 2023 10:52) (67 - 82)  BP: 141/83 (04 Nov 2023 10:52) (129/78 - 144/84)  BP(mean): 95 (03 Nov 2023 15:22) (95 - 95)  RR: 18 (04 Nov 2023 10:52) (18 - 18)  SpO2: 99% (04 Nov 2023 10:52) (98% - 99%)    Parameters below as of 04 Nov 2023 04:42  Patient On (Oxygen Delivery Method): room air        PHYSICAL EXAM:    GENERAL: alert, not in distress  CHEST/LUNG: b/l air entry  HEART: reg  ABDOMEN: soft, bs+  EXTREMITIES:  dressing left foot, rest no edema, tenderness    MEDICATIONS  (STANDING):  busPIRone 30 milliGRAM(s) Oral two times a day  ceFAZolin  Injectable. 2000 milliGRAM(s) IV Push every 8 hours  enoxaparin Injectable 40 milliGRAM(s) SubCutaneous every 24 hours  hydrALAZINE 50 milliGRAM(s) Oral two times a day  metoprolol succinate  milliGRAM(s) Oral daily  oxyCODONE    IR 15 milliGRAM(s) Oral four times a day  pantoprazole    Tablet 40 milliGRAM(s) Oral before breakfast  polyethylene glycol 3350 17 Gram(s) Oral daily  senna 2 Tablet(s) Oral at bedtime    MEDICATIONS  (PRN):  acetaminophen     Tablet .. 650 milliGRAM(s) Oral every 6 hours PRN Temp greater or equal to 38C (100.4F), Mild Pain (1 - 3)  aluminum hydroxide/magnesium hydroxide/simethicone Suspension 30 milliLiter(s) Oral every 4 hours PRN Dyspepsia  melatonin 3 milliGRAM(s) Oral at bedtime PRN Insomnia  ondansetron   Disintegrating Tablet 4 milliGRAM(s) Oral four times a day PRN Nausea  ondansetron Injectable 4 milliGRAM(s) IV Push every 8 hours PRN Nausea and/or Vomiting  oxyCODONE    IR 20 milliGRAM(s) Oral every 4 hours PRN Severe Pain (7 - 10)      Allergies    penicillin (Unknown)  amoxicillin (Anaphylaxis)  citrus (Unknown)  Milk (Unknown)    Intolerances          LABS:                          14.3   6.04  )-----------( 249      ( 03 Nov 2023 00:55 )             41.5     11-03    137  |  96  |  10.0  ----------------------------<  93  3.8   |  28.0  |  0.65    Ca    9.7      03 Nov 2023 00:55    TPro  8.2  /  Alb  4.6  /  TBili  0.4  /  DBili  x   /  AST  23  /  ALT  <5  /  AlkPhos  66  11-03      Urinalysis Basic - ( 03 Nov 2023 00:55 )    Color: x / Appearance: x / SG: x / pH: x  Gluc: 93 mg/dL / Ketone: x  / Bili: x / Urobili: x   Blood: x / Protein: x / Nitrite: x   Leuk Esterase: x / RBC: x / WBC x   Sq Epi: x / Non Sq Epi: x / Bacteria: x        RADIOLOGY & ADDITIONAL TESTS:

## 2023-11-05 PROCEDURE — 99232 SBSQ HOSP IP/OBS MODERATE 35: CPT

## 2023-11-05 RX ORDER — OXYCODONE HYDROCHLORIDE 5 MG/1
15 TABLET ORAL EVERY 4 HOURS
Refills: 0 | Status: DISCONTINUED | OUTPATIENT
Start: 2023-11-05 | End: 2023-11-06

## 2023-11-05 RX ORDER — OXYCODONE HYDROCHLORIDE 5 MG/1
15 TABLET ORAL EVERY 4 HOURS
Refills: 0 | Status: DISCONTINUED | OUTPATIENT
Start: 2023-11-05 | End: 2023-11-05

## 2023-11-05 RX ADMIN — PANTOPRAZOLE SODIUM 40 MILLIGRAM(S): 20 TABLET, DELAYED RELEASE ORAL at 06:04

## 2023-11-05 RX ADMIN — Medication 30 MILLIGRAM(S): at 17:44

## 2023-11-05 RX ADMIN — OXYCODONE HYDROCHLORIDE 15 MILLIGRAM(S): 5 TABLET ORAL at 15:34

## 2023-11-05 RX ADMIN — OXYCODONE HYDROCHLORIDE 15 MILLIGRAM(S): 5 TABLET ORAL at 16:05

## 2023-11-05 RX ADMIN — OXYCODONE HYDROCHLORIDE 15 MILLIGRAM(S): 5 TABLET ORAL at 19:19

## 2023-11-05 RX ADMIN — Medication 50 MILLIGRAM(S): at 06:09

## 2023-11-05 RX ADMIN — OXYCODONE HYDROCHLORIDE 20 MILLIGRAM(S): 5 TABLET ORAL at 07:55

## 2023-11-05 RX ADMIN — ENOXAPARIN SODIUM 40 MILLIGRAM(S): 100 INJECTION SUBCUTANEOUS at 17:49

## 2023-11-05 RX ADMIN — OXYCODONE HYDROCHLORIDE 15 MILLIGRAM(S): 5 TABLET ORAL at 00:08

## 2023-11-05 RX ADMIN — OXYCODONE HYDROCHLORIDE 20 MILLIGRAM(S): 5 TABLET ORAL at 23:19

## 2023-11-05 RX ADMIN — OXYCODONE HYDROCHLORIDE 20 MILLIGRAM(S): 5 TABLET ORAL at 07:14

## 2023-11-05 RX ADMIN — POLYETHYLENE GLYCOL 3350 17 GRAM(S): 17 POWDER, FOR SOLUTION ORAL at 11:37

## 2023-11-05 RX ADMIN — Medication 100 MILLIGRAM(S): at 06:04

## 2023-11-05 RX ADMIN — Medication 50 MILLIGRAM(S): at 17:44

## 2023-11-05 RX ADMIN — OXYCODONE HYDROCHLORIDE 15 MILLIGRAM(S): 5 TABLET ORAL at 20:45

## 2023-11-05 RX ADMIN — Medication 2000 MILLIGRAM(S): at 13:38

## 2023-11-05 RX ADMIN — OXYCODONE HYDROCHLORIDE 20 MILLIGRAM(S): 5 TABLET ORAL at 03:04

## 2023-11-05 RX ADMIN — OXYCODONE HYDROCHLORIDE 15 MILLIGRAM(S): 5 TABLET ORAL at 12:10

## 2023-11-05 RX ADMIN — Medication 30 MILLIGRAM(S): at 06:05

## 2023-11-05 RX ADMIN — Medication 2000 MILLIGRAM(S): at 06:04

## 2023-11-05 RX ADMIN — OXYCODONE HYDROCHLORIDE 15 MILLIGRAM(S): 5 TABLET ORAL at 11:37

## 2023-11-05 RX ADMIN — Medication 2000 MILLIGRAM(S): at 21:17

## 2023-11-05 NOTE — PROGRESS NOTE ADULT - SUBJECTIVE AND OBJECTIVE BOX
INTERVAL HPI/OVERNIGHT EVENTS:    CC: recent bacteremia, dislodged midline, chronic opioid use      No overnight events  denies complaints.    Vital Signs Last 24 Hrs  T(C): 36.8 (05 Nov 2023 04:30), Max: 36.8 (05 Nov 2023 04:30)  T(F): 98.2 (05 Nov 2023 04:30), Max: 98.2 (05 Nov 2023 04:30)  HR: 73 (05 Nov 2023 04:30) (62 - 73)  BP: 146/81 (05 Nov 2023 04:30) (111/72 - 146/81)  BP(mean): --  RR: 18 (05 Nov 2023 04:30) (18 - 18)  SpO2: 97% (05 Nov 2023 04:30) (97% - 99%)        PHYSICAL EXAM:    GENERAL: alert, not in distress  CHEST/LUNG: b/l air entry  HEART: reg  ABDOMEN: soft, bs+  EXTREMITIES:  dressing over left leg, right LE no edema, tenderness    MEDICATIONS  (STANDING):  busPIRone 30 milliGRAM(s) Oral two times a day  ceFAZolin  Injectable. 2000 milliGRAM(s) IV Push every 8 hours  enoxaparin Injectable 40 milliGRAM(s) SubCutaneous every 24 hours  hydrALAZINE 50 milliGRAM(s) Oral two times a day  metoprolol succinate  milliGRAM(s) Oral daily  oxyCODONE    IR 15 milliGRAM(s) Oral every 4 hours  pantoprazole    Tablet 40 milliGRAM(s) Oral before breakfast  polyethylene glycol 3350 17 Gram(s) Oral daily  senna 2 Tablet(s) Oral at bedtime    MEDICATIONS  (PRN):  acetaminophen     Tablet .. 650 milliGRAM(s) Oral every 6 hours PRN Temp greater or equal to 38C (100.4F), Mild Pain (1 - 3)  aluminum hydroxide/magnesium hydroxide/simethicone Suspension 30 milliLiter(s) Oral every 4 hours PRN Dyspepsia  melatonin 3 milliGRAM(s) Oral at bedtime PRN Insomnia  ondansetron   Disintegrating Tablet 4 milliGRAM(s) Oral four times a day PRN Nausea  ondansetron Injectable 4 milliGRAM(s) IV Push every 8 hours PRN Nausea and/or Vomiting  oxyCODONE    IR 20 milliGRAM(s) Oral every 4 hours PRN Severe Pain (7 - 10)      Allergies    penicillin (Unknown)  amoxicillin (Anaphylaxis)  citrus (Unknown)  Milk (Unknown)    Intolerances          LABS:                  RADIOLOGY & ADDITIONAL TESTS:

## 2023-11-06 LAB
ANION GAP SERPL CALC-SCNC: 14 MMOL/L — SIGNIFICANT CHANGE UP (ref 5–17)
ANION GAP SERPL CALC-SCNC: 14 MMOL/L — SIGNIFICANT CHANGE UP (ref 5–17)
BASOPHILS # BLD AUTO: 0.02 K/UL — SIGNIFICANT CHANGE UP (ref 0–0.2)
BASOPHILS # BLD AUTO: 0.02 K/UL — SIGNIFICANT CHANGE UP (ref 0–0.2)
BASOPHILS NFR BLD AUTO: 0.4 % — SIGNIFICANT CHANGE UP (ref 0–2)
BASOPHILS NFR BLD AUTO: 0.4 % — SIGNIFICANT CHANGE UP (ref 0–2)
BUN SERPL-MCNC: 7.6 MG/DL — LOW (ref 8–20)
BUN SERPL-MCNC: 7.6 MG/DL — LOW (ref 8–20)
CALCIUM SERPL-MCNC: 9.2 MG/DL — SIGNIFICANT CHANGE UP (ref 8.4–10.5)
CALCIUM SERPL-MCNC: 9.2 MG/DL — SIGNIFICANT CHANGE UP (ref 8.4–10.5)
CHLORIDE SERPL-SCNC: 97 MMOL/L — SIGNIFICANT CHANGE UP (ref 96–108)
CHLORIDE SERPL-SCNC: 97 MMOL/L — SIGNIFICANT CHANGE UP (ref 96–108)
CO2 SERPL-SCNC: 25 MMOL/L — SIGNIFICANT CHANGE UP (ref 22–29)
CO2 SERPL-SCNC: 25 MMOL/L — SIGNIFICANT CHANGE UP (ref 22–29)
CREAT SERPL-MCNC: 0.7 MG/DL — SIGNIFICANT CHANGE UP (ref 0.5–1.3)
CREAT SERPL-MCNC: 0.7 MG/DL — SIGNIFICANT CHANGE UP (ref 0.5–1.3)
EGFR: 108 ML/MIN/1.73M2 — SIGNIFICANT CHANGE UP
EGFR: 108 ML/MIN/1.73M2 — SIGNIFICANT CHANGE UP
EOSINOPHIL # BLD AUTO: 0.14 K/UL — SIGNIFICANT CHANGE UP (ref 0–0.5)
EOSINOPHIL # BLD AUTO: 0.14 K/UL — SIGNIFICANT CHANGE UP (ref 0–0.5)
EOSINOPHIL NFR BLD AUTO: 3 % — SIGNIFICANT CHANGE UP (ref 0–6)
EOSINOPHIL NFR BLD AUTO: 3 % — SIGNIFICANT CHANGE UP (ref 0–6)
GLUCOSE SERPL-MCNC: 117 MG/DL — HIGH (ref 70–99)
GLUCOSE SERPL-MCNC: 117 MG/DL — HIGH (ref 70–99)
HCT VFR BLD CALC: 34.7 % — SIGNIFICANT CHANGE UP (ref 34.5–45)
HCT VFR BLD CALC: 34.7 % — SIGNIFICANT CHANGE UP (ref 34.5–45)
HGB BLD-MCNC: 12 G/DL — SIGNIFICANT CHANGE UP (ref 11.5–15.5)
HGB BLD-MCNC: 12 G/DL — SIGNIFICANT CHANGE UP (ref 11.5–15.5)
IMM GRANULOCYTES NFR BLD AUTO: 0.2 % — SIGNIFICANT CHANGE UP (ref 0–0.9)
IMM GRANULOCYTES NFR BLD AUTO: 0.2 % — SIGNIFICANT CHANGE UP (ref 0–0.9)
LYMPHOCYTES # BLD AUTO: 2.32 K/UL — SIGNIFICANT CHANGE UP (ref 1–3.3)
LYMPHOCYTES # BLD AUTO: 2.32 K/UL — SIGNIFICANT CHANGE UP (ref 1–3.3)
LYMPHOCYTES # BLD AUTO: 50 % — HIGH (ref 13–44)
LYMPHOCYTES # BLD AUTO: 50 % — HIGH (ref 13–44)
MCHC RBC-ENTMCNC: 30.9 PG — SIGNIFICANT CHANGE UP (ref 27–34)
MCHC RBC-ENTMCNC: 30.9 PG — SIGNIFICANT CHANGE UP (ref 27–34)
MCHC RBC-ENTMCNC: 34.6 GM/DL — SIGNIFICANT CHANGE UP (ref 32–36)
MCHC RBC-ENTMCNC: 34.6 GM/DL — SIGNIFICANT CHANGE UP (ref 32–36)
MCV RBC AUTO: 89.4 FL — SIGNIFICANT CHANGE UP (ref 80–100)
MCV RBC AUTO: 89.4 FL — SIGNIFICANT CHANGE UP (ref 80–100)
MONOCYTES # BLD AUTO: 0.45 K/UL — SIGNIFICANT CHANGE UP (ref 0–0.9)
MONOCYTES # BLD AUTO: 0.45 K/UL — SIGNIFICANT CHANGE UP (ref 0–0.9)
MONOCYTES NFR BLD AUTO: 9.7 % — SIGNIFICANT CHANGE UP (ref 2–14)
MONOCYTES NFR BLD AUTO: 9.7 % — SIGNIFICANT CHANGE UP (ref 2–14)
NEUTROPHILS # BLD AUTO: 1.7 K/UL — LOW (ref 1.8–7.4)
NEUTROPHILS # BLD AUTO: 1.7 K/UL — LOW (ref 1.8–7.4)
NEUTROPHILS NFR BLD AUTO: 36.7 % — LOW (ref 43–77)
NEUTROPHILS NFR BLD AUTO: 36.7 % — LOW (ref 43–77)
PLATELET # BLD AUTO: 252 K/UL — SIGNIFICANT CHANGE UP (ref 150–400)
PLATELET # BLD AUTO: 252 K/UL — SIGNIFICANT CHANGE UP (ref 150–400)
POTASSIUM SERPL-MCNC: 3.8 MMOL/L — SIGNIFICANT CHANGE UP (ref 3.5–5.3)
POTASSIUM SERPL-MCNC: 3.8 MMOL/L — SIGNIFICANT CHANGE UP (ref 3.5–5.3)
POTASSIUM SERPL-SCNC: 3.8 MMOL/L — SIGNIFICANT CHANGE UP (ref 3.5–5.3)
POTASSIUM SERPL-SCNC: 3.8 MMOL/L — SIGNIFICANT CHANGE UP (ref 3.5–5.3)
RBC # BLD: 3.88 M/UL — SIGNIFICANT CHANGE UP (ref 3.8–5.2)
RBC # BLD: 3.88 M/UL — SIGNIFICANT CHANGE UP (ref 3.8–5.2)
RBC # FLD: 14.1 % — SIGNIFICANT CHANGE UP (ref 10.3–14.5)
RBC # FLD: 14.1 % — SIGNIFICANT CHANGE UP (ref 10.3–14.5)
SODIUM SERPL-SCNC: 136 MMOL/L — SIGNIFICANT CHANGE UP (ref 135–145)
SODIUM SERPL-SCNC: 136 MMOL/L — SIGNIFICANT CHANGE UP (ref 135–145)
WBC # BLD: 4.64 K/UL — SIGNIFICANT CHANGE UP (ref 3.8–10.5)
WBC # BLD: 4.64 K/UL — SIGNIFICANT CHANGE UP (ref 3.8–10.5)
WBC # FLD AUTO: 4.64 K/UL — SIGNIFICANT CHANGE UP (ref 3.8–10.5)
WBC # FLD AUTO: 4.64 K/UL — SIGNIFICANT CHANGE UP (ref 3.8–10.5)

## 2023-11-06 PROCEDURE — 99222 1ST HOSP IP/OBS MODERATE 55: CPT

## 2023-11-06 PROCEDURE — 99232 SBSQ HOSP IP/OBS MODERATE 35: CPT

## 2023-11-06 RX ORDER — ACETAMINOPHEN 500 MG
1000 TABLET ORAL ONCE
Refills: 0 | Status: COMPLETED | OUTPATIENT
Start: 2023-11-06 | End: 2023-11-06

## 2023-11-06 RX ORDER — OXYCODONE HYDROCHLORIDE 5 MG/1
15 TABLET ORAL EVERY 4 HOURS
Refills: 0 | Status: DISCONTINUED | OUTPATIENT
Start: 2023-11-06 | End: 2023-11-07

## 2023-11-06 RX ORDER — SERTRALINE 25 MG/1
100 TABLET, FILM COATED ORAL DAILY
Refills: 0 | Status: DISCONTINUED | OUTPATIENT
Start: 2023-11-06 | End: 2023-11-07

## 2023-11-06 RX ADMIN — Medication 30 MILLIGRAM(S): at 17:50

## 2023-11-06 RX ADMIN — ONDANSETRON 4 MILLIGRAM(S): 8 TABLET, FILM COATED ORAL at 06:52

## 2023-11-06 RX ADMIN — OXYCODONE HYDROCHLORIDE 20 MILLIGRAM(S): 5 TABLET ORAL at 03:23

## 2023-11-06 RX ADMIN — OXYCODONE HYDROCHLORIDE 15 MILLIGRAM(S): 5 TABLET ORAL at 22:54

## 2023-11-06 RX ADMIN — OXYCODONE HYDROCHLORIDE 15 MILLIGRAM(S): 5 TABLET ORAL at 21:20

## 2023-11-06 RX ADMIN — OXYCODONE HYDROCHLORIDE 15 MILLIGRAM(S): 5 TABLET ORAL at 17:50

## 2023-11-06 RX ADMIN — Medication 50 MILLIGRAM(S): at 17:50

## 2023-11-06 RX ADMIN — OXYCODONE HYDROCHLORIDE 20 MILLIGRAM(S): 5 TABLET ORAL at 16:19

## 2023-11-06 RX ADMIN — OXYCODONE HYDROCHLORIDE 20 MILLIGRAM(S): 5 TABLET ORAL at 11:38

## 2023-11-06 RX ADMIN — PANTOPRAZOLE SODIUM 40 MILLIGRAM(S): 20 TABLET, DELAYED RELEASE ORAL at 05:07

## 2023-11-06 RX ADMIN — OXYCODONE HYDROCHLORIDE 15 MILLIGRAM(S): 5 TABLET ORAL at 13:34

## 2023-11-06 RX ADMIN — ONDANSETRON 4 MILLIGRAM(S): 8 TABLET, FILM COATED ORAL at 13:39

## 2023-11-06 RX ADMIN — Medication 2000 MILLIGRAM(S): at 13:34

## 2023-11-06 RX ADMIN — ENOXAPARIN SODIUM 40 MILLIGRAM(S): 100 INJECTION SUBCUTANEOUS at 17:50

## 2023-11-06 RX ADMIN — Medication 100 MILLIGRAM(S): at 05:07

## 2023-11-06 RX ADMIN — OXYCODONE HYDROCHLORIDE 20 MILLIGRAM(S): 5 TABLET ORAL at 17:20

## 2023-11-06 RX ADMIN — SENNA PLUS 2 TABLET(S): 8.6 TABLET ORAL at 21:20

## 2023-11-06 RX ADMIN — OXYCODONE HYDROCHLORIDE 15 MILLIGRAM(S): 5 TABLET ORAL at 14:34

## 2023-11-06 RX ADMIN — OXYCODONE HYDROCHLORIDE 20 MILLIGRAM(S): 5 TABLET ORAL at 08:52

## 2023-11-06 RX ADMIN — OXYCODONE HYDROCHLORIDE 20 MILLIGRAM(S): 5 TABLET ORAL at 12:40

## 2023-11-06 RX ADMIN — Medication 30 MILLIGRAM(S): at 05:07

## 2023-11-06 RX ADMIN — Medication 2000 MILLIGRAM(S): at 21:20

## 2023-11-06 RX ADMIN — SERTRALINE 100 MILLIGRAM(S): 25 TABLET, FILM COATED ORAL at 21:20

## 2023-11-06 RX ADMIN — Medication 2000 MILLIGRAM(S): at 05:06

## 2023-11-06 RX ADMIN — OXYCODONE HYDROCHLORIDE 20 MILLIGRAM(S): 5 TABLET ORAL at 23:53

## 2023-11-06 RX ADMIN — Medication 50 MILLIGRAM(S): at 05:07

## 2023-11-06 RX ADMIN — OXYCODONE HYDROCHLORIDE 15 MILLIGRAM(S): 5 TABLET ORAL at 18:50

## 2023-11-06 RX ADMIN — Medication 400 MILLIGRAM(S): at 01:19

## 2023-11-06 RX ADMIN — OXYCODONE HYDROCHLORIDE 20 MILLIGRAM(S): 5 TABLET ORAL at 07:52

## 2023-11-06 NOTE — CONSULT NOTE ADULT - ASSESSMENT
48-year-old female with a complicated medical h/o L4/5 decompression done on January 13, 2019 at [facility]. At that time she had cauda equina syndrome and was admitted in February 2019 for wound infection from that surgery. She had MSSA growing from the incision site and had a I&D done on February 22, 2019. She was discharged on IV cefazolin which she completed in April 4, 2019. She followed up with us after that time. Stopped following up after April 21 2020. She recently was seen at [facility] emergency room for skin peeling. She was discharged from the emergency room after her labs and work-up was within normal limits. Patient is concerned for any infection since she had a severe infection in 2019 and is traumatized from that, so was seen in the office on 5/16/23 and had a negative work up. Patient was seen in the office on 10/23 with c/o chills and drainage from the left foot. Blood cultures and blood work was obtained in the office. Blood cultures with GPC which resulted as CoNS  (S) Cefazolin. Patient was discharged that hospitalization on 11/1 with a Midline. Patient was placed on observation and on November 3 was seen by the Access team.  Due to pending blood cultures done from the ER visit midline was deferred.  Patient was admitted and received intravenous antibiotics.      Positive blood cultures  Left foot  wound    - Blood cultures in the office on 10/23 with CoNS  (S) Cefazolin  - Repeat blood cultures 10/25and 11/3 no growth   - XR Foot with no acute findings   - MRI foot, left with no OM  - Podiatry eval noted   - Procalcitonin level in office < 0.03  - CRP 10  - ESR 46  - TTE reporting no veg   - Continue Cefazolin   - Plan for antibiotics till 11/7/23  - Trend Fever  - Trend WBC    Will sign off. Please call PRN.      Discussed treatment plan with: Dr Valdez

## 2023-11-06 NOTE — CONSULT NOTE ADULT - SUBJECTIVE AND OBJECTIVE BOX
[facility]  INFECTIOUS DISEASES at [place] / [place] / [place]  =======================================================                               Perry Carreno MD#   Zuleyka Mccabe MD*                             Lizzy Acosta MD*   Barbara Luna MD*                              Professor Emeritus:  Dr Bebeto Mccall MD^            Diplomates American Board of Internal Medicine & Infectious Diseases                # [place] Office - Appt - Tel  [phone] Fax [phone]                * [place] Office - Appt - Tel [phone] Fax [phone]               ^ [place] Office - Appt - Tel  [phone] Fax [phone]                                  Hospital Consult line:  [phone]  =======================================================      [id number]  CLARENCE VITALE    CC: Patient is a 48y old  Female who presents with a chief complaint of Displaced midline and need for continuous antibiotics. (05 Nov 2023 10:01)      48-year-old female with a complicated medical h/o L4/5 decompression done on January 13, 2019 at [facility]. At that time she had cauda equina syndrome and was admitted in February 2019 for wound infection from that surgery. She had MSSA growing from the incision site and had a I&D done on February 22, 2019. She was discharged on IV cefazolin which she completed in April 4, 2019. She followed up with us after that time. Stopped following up after April 21 2020. She recently was seen at [facility] emergency room for skin peeling. She was discharged from the emergency room after her labs and work-up was within normal limits. Patient is concerned for any infection since she had a severe infection in 2019 and is traumatized from that, so was seen in the office on 5/16/23 and had a negative work up. Patient was seen in the office on 10/23 with c/o chills and drainage from the left foot. Blood cultures and blood work was obtained in the office. Blood cultures with GPC which resulted as CoNS  (S) Cefazolin. Patient was discharged that hospitalization on 11/1 with a Midline. Patient was placed on observation and on November 3 was seen by the Access team.  Due to pending blood cultures done from the ER visit midline was deferred.  Patient was admitted and received intravenous antibiotics.  ID input requested.       Past Medical & Surgical Hx:  Dental infection  Hypertension, unspecified type  Diabetes mellitus  Sciatica  Chronic low back pain, unspecified back pain laterality, unspecified whether sciatica present  History of cholecystectomy  H/O Spinal surgery      Social Hx:  + Smoker       FAMILY HISTORY:  No pertinent family history in first degree relatives      Allergies  penicillin (Unknown)  amoxicillin (Anaphylaxis)  citrus (Unknown)  Milk (Unknown)         REVIEW OF SYSTEMS:  CONSTITUTIONAL:  No Fever or chills  HEENT:  No diplopia or blurred vision.  No earache, sore throat or runny nose.  CARDIOVASCULAR:  No chest pain  RESPIRATORY:  No cough, shortness of breath  GASTROINTESTINAL:  No nausea, vomiting or diarrhea.  GENITOURINARY:  No dysuria, frequency or urgency. No Blood in urine  MUSCULOSKELETAL:  no joint aches, no muscle pain  SKIN:  No change in skin, hair or nails.  NEUROLOGIC:  No Headaches      Physical Exam:  GEN: NAD  HEENT: normocephalic and atraumatic. EOMI. PERRL.    NECK: Supple.   LUNGS: CTA B/L.  HEART: RRR  ABDOMEN: Soft, NT, ND.  +BS.    : No CVA tenderness  EXTREMITIES: Without  edema.  MSK: No joint swelling  NEUROLOGIC: No Focal Deficits   PSYCHIATRIC: Appropriate affect .  SKIN: No rash      Vitals:  T(F): 98.5 (06 Nov 2023 07:46), Max: 98.5 (06 Nov 2023 07:46)  HR: 82 (06 Nov 2023 11:25)  BP: 149/89 (06 Nov 2023 11:25)  RR: 18 (06 Nov 2023 07:46)  SpO2: 98% (06 Nov 2023 07:46) (97% - 98%)  temp max in last 48H T(F): , Max: 98.5 (11-06-23 @ 07:46)    Current Antibiotics:  ceFAZolin  Injectable. 2000 milliGRAM(s) IV Push every 8 hours    Other medications:  busPIRone 30 milliGRAM(s) Oral two times a day  enoxaparin Injectable 40 milliGRAM(s) SubCutaneous every 24 hours  hydrALAZINE 50 milliGRAM(s) Oral two times a day  metoprolol succinate  milliGRAM(s) Oral daily  oxyCODONE    IR 15 milliGRAM(s) Oral every 4 hours  pantoprazole    Tablet 40 milliGRAM(s) Oral before breakfast  polyethylene glycol 3350 17 Gram(s) Oral daily  senna 2 Tablet(s) Oral at bedtime                            12.0   4.64  )-----------( 252      ( 06 Nov 2023 06:20 )             34.7     11-06    136  |  97  |  7.6<L>  ----------------------------<  117<H>  3.8   |  25.0  |  0.70    Ca    9.2      06 Nov 2023 06:20      RECENT CULTURES:  11-03 @ 00:50 .Blood Blood-Peripheral     No growth at 72 Hours    10-25 @ 00:20 .Blood Blood-Peripheral     No growth at 5 days    10-25 @ 00:15 .Blood Blood-Peripheral     No growth at 5 days      WBC Count: 4.64 K/uL (11-06-23 @ 06:20)  WBC Count: 6.04 K/uL (11-03-23 @ 00:55)    Creatinine: 0.70 mg/dL (11-06-23 @ 06:20)  Creatinine: 0.65 mg/dL (11-03-23 @ 00:55)    C-Reactive Protein, Serum: 10 mg/L (11-03-23 @ 00:55)  C-Reactive Protein, Serum: 11 mg/L (10-30-23 @ 06:15)  C-Reactive Protein, Serum: 18 mg/L (10-26-23 @ 16:29)  C-Reactive Protein, Serum: 5 mg/L (10-25-23 @ 00:20)    Sedimentation Rate, Erythrocyte: 46 mm/hr (11-03-23 @ 00:55)  Sedimentation Rate, Erythrocyte: 45 mm/hr (10-26-23 @ 16:29)  Sedimentation Rate, Erythrocyte: 40 mm/hr (10-25-23 @ 00:20)

## 2023-11-06 NOTE — PROGRESS NOTE ADULT - SUBJECTIVE AND OBJECTIVE BOX
INTERVAL HPI/OVERNIGHT EVENTS:    CC: recent bacteremia, dislodged midline, chronic opioid use    No overnight events  denies complaints.     Vital Signs Last 24 Hrs  T(C): 36.9 (06 Nov 2023 07:46), Max: 36.9 (06 Nov 2023 07:46)  T(F): 98.5 (06 Nov 2023 07:46), Max: 98.5 (06 Nov 2023 07:46)  HR: 82 (06 Nov 2023 11:25) (62 - 85)  BP: 149/89 (06 Nov 2023 11:25) (108/72 - 149/89)  BP(mean): 98 (05 Nov 2023 23:15) (98 - 98)  RR: 18 (06 Nov 2023 07:46) (1 - 18)  SpO2: 98% (06 Nov 2023 07:46) (97% - 98%)    Parameters below as of 06 Nov 2023 05:10  Patient On (Oxygen Delivery Method): room air        PHYSICAL EXAM:    GENERAL: alert, not in distress  CHEST/LUNG: b/l air entry  HEART: reg  ABDOMEN: soft, bs+  EXTREMITIES:  no edema, tenderness, left LE dressing present.     MEDICATIONS  (STANDING):  busPIRone 30 milliGRAM(s) Oral two times a day  ceFAZolin  Injectable. 2000 milliGRAM(s) IV Push every 8 hours  enoxaparin Injectable 40 milliGRAM(s) SubCutaneous every 24 hours  hydrALAZINE 50 milliGRAM(s) Oral two times a day  metoprolol succinate  milliGRAM(s) Oral daily  oxyCODONE    IR 15 milliGRAM(s) Oral every 4 hours  pantoprazole    Tablet 40 milliGRAM(s) Oral before breakfast  polyethylene glycol 3350 17 Gram(s) Oral daily  senna 2 Tablet(s) Oral at bedtime    MEDICATIONS  (PRN):  acetaminophen     Tablet .. 650 milliGRAM(s) Oral every 6 hours PRN Temp greater or equal to 38C (100.4F), Mild Pain (1 - 3)  aluminum hydroxide/magnesium hydroxide/simethicone Suspension 30 milliLiter(s) Oral every 4 hours PRN Dyspepsia  melatonin 3 milliGRAM(s) Oral at bedtime PRN Insomnia  ondansetron   Disintegrating Tablet 4 milliGRAM(s) Oral four times a day PRN Nausea  ondansetron Injectable 4 milliGRAM(s) IV Push every 8 hours PRN Nausea and/or Vomiting  oxyCODONE    IR 20 milliGRAM(s) Oral every 4 hours PRN Severe Pain (7 - 10)      Allergies    penicillin (Unknown)  amoxicillin (Anaphylaxis)  citrus (Unknown)  Milk (Unknown)    Intolerances          LABS:                          12.0   4.64  )-----------( 252      ( 06 Nov 2023 06:20 )             34.7     11-06    136  |  97  |  7.6<L>  ----------------------------<  117<H>  3.8   |  25.0  |  0.70    Ca    9.2      06 Nov 2023 06:20        Urinalysis Basic - ( 06 Nov 2023 06:20 )    Color: x / Appearance: x / SG: x / pH: x  Gluc: 117 mg/dL / Ketone: x  / Bili: x / Urobili: x   Blood: x / Protein: x / Nitrite: x   Leuk Esterase: x / RBC: x / WBC x   Sq Epi: x / Non Sq Epi: x / Bacteria: x        RADIOLOGY & ADDITIONAL TESTS:

## 2023-11-07 ENCOUNTER — TRANSCRIPTION ENCOUNTER (OUTPATIENT)
Age: 48
End: 2023-11-07

## 2023-11-07 VITALS
OXYGEN SATURATION: 99 % | DIASTOLIC BLOOD PRESSURE: 83 MMHG | SYSTOLIC BLOOD PRESSURE: 123 MMHG | RESPIRATION RATE: 19 BRPM | HEART RATE: 74 BPM | TEMPERATURE: 99 F

## 2023-11-07 PROCEDURE — C1751: CPT

## 2023-11-07 PROCEDURE — 99239 HOSP IP/OBS DSCHRG MGMT >30: CPT

## 2023-11-07 PROCEDURE — 87040 BLOOD CULTURE FOR BACTERIA: CPT

## 2023-11-07 PROCEDURE — 80053 COMPREHEN METABOLIC PANEL: CPT

## 2023-11-07 PROCEDURE — 82962 GLUCOSE BLOOD TEST: CPT

## 2023-11-07 PROCEDURE — 85652 RBC SED RATE AUTOMATED: CPT

## 2023-11-07 PROCEDURE — 96374 THER/PROPH/DIAG INJ IV PUSH: CPT | Mod: XU

## 2023-11-07 PROCEDURE — 36569 INSJ PICC 5 YR+ W/O IMAGING: CPT

## 2023-11-07 PROCEDURE — 99285 EMERGENCY DEPT VISIT HI MDM: CPT

## 2023-11-07 PROCEDURE — 36415 COLL VENOUS BLD VENIPUNCTURE: CPT

## 2023-11-07 PROCEDURE — 80048 BASIC METABOLIC PNL TOTAL CA: CPT

## 2023-11-07 PROCEDURE — 96375 TX/PRO/DX INJ NEW DRUG ADDON: CPT | Mod: XU

## 2023-11-07 PROCEDURE — G0378: CPT

## 2023-11-07 PROCEDURE — 86140 C-REACTIVE PROTEIN: CPT

## 2023-11-07 PROCEDURE — 85025 COMPLETE CBC W/AUTO DIFF WBC: CPT

## 2023-11-07 RX ORDER — OXYCODONE HYDROCHLORIDE 5 MG/1
1 TABLET ORAL
Qty: 0 | Refills: 0 | DISCHARGE
Start: 2023-11-07

## 2023-11-07 RX ORDER — SERTRALINE 25 MG/1
1 TABLET, FILM COATED ORAL
Refills: 0 | DISCHARGE

## 2023-11-07 RX ORDER — SERTRALINE 25 MG/1
1 TABLET, FILM COATED ORAL
Qty: 0 | Refills: 0 | DISCHARGE
Start: 2023-11-07

## 2023-11-07 RX ORDER — BENZOCAINE AND RESORCINOL 200; 30 MG/G; MG/G
1 CREAM TOPICAL
Qty: 1 | Refills: 0
Start: 2023-11-07 | End: 2023-11-09

## 2023-11-07 RX ADMIN — OXYCODONE HYDROCHLORIDE 15 MILLIGRAM(S): 5 TABLET ORAL at 11:00

## 2023-11-07 RX ADMIN — OXYCODONE HYDROCHLORIDE 20 MILLIGRAM(S): 5 TABLET ORAL at 00:55

## 2023-11-07 RX ADMIN — OXYCODONE HYDROCHLORIDE 15 MILLIGRAM(S): 5 TABLET ORAL at 02:07

## 2023-11-07 RX ADMIN — SERTRALINE 100 MILLIGRAM(S): 25 TABLET, FILM COATED ORAL at 10:32

## 2023-11-07 RX ADMIN — Medication 2000 MILLIGRAM(S): at 05:06

## 2023-11-07 RX ADMIN — OXYCODONE HYDROCHLORIDE 20 MILLIGRAM(S): 5 TABLET ORAL at 09:29

## 2023-11-07 RX ADMIN — ONDANSETRON 4 MILLIGRAM(S): 8 TABLET, FILM COATED ORAL at 12:57

## 2023-11-07 RX ADMIN — Medication 100 MILLIGRAM(S): at 05:06

## 2023-11-07 RX ADMIN — Medication 2000 MILLIGRAM(S): at 13:32

## 2023-11-07 RX ADMIN — Medication 50 MILLIGRAM(S): at 05:05

## 2023-11-07 RX ADMIN — OXYCODONE HYDROCHLORIDE 20 MILLIGRAM(S): 5 TABLET ORAL at 08:56

## 2023-11-07 RX ADMIN — OXYCODONE HYDROCHLORIDE 20 MILLIGRAM(S): 5 TABLET ORAL at 04:22

## 2023-11-07 RX ADMIN — ONDANSETRON 4 MILLIGRAM(S): 8 TABLET, FILM COATED ORAL at 06:30

## 2023-11-07 RX ADMIN — OXYCODONE HYDROCHLORIDE 15 MILLIGRAM(S): 5 TABLET ORAL at 10:08

## 2023-11-07 RX ADMIN — Medication 30 MILLIGRAM(S): at 05:06

## 2023-11-07 RX ADMIN — OXYCODONE HYDROCHLORIDE 15 MILLIGRAM(S): 5 TABLET ORAL at 13:12

## 2023-11-07 RX ADMIN — PANTOPRAZOLE SODIUM 40 MILLIGRAM(S): 20 TABLET, DELAYED RELEASE ORAL at 05:06

## 2023-11-07 RX ADMIN — OXYCODONE HYDROCHLORIDE 20 MILLIGRAM(S): 5 TABLET ORAL at 05:30

## 2023-11-07 RX ADMIN — OXYCODONE HYDROCHLORIDE 15 MILLIGRAM(S): 5 TABLET ORAL at 03:14

## 2023-11-07 RX ADMIN — OXYCODONE HYDROCHLORIDE 15 MILLIGRAM(S): 5 TABLET ORAL at 06:30

## 2023-11-07 NOTE — DISCHARGE NOTE PROVIDER - ATTENDING DISCHARGE PHYSICAL EXAMINATION:
GENERAL: alert, not in distress  CHEST/LUNG: b/l air entry  HEART: reg  ABDOMEN: soft, bs+  EXTREMITIES:  no edema, tenderness, left LE dressing present.

## 2023-11-07 NOTE — PROGRESS NOTE ADULT - SUBJECTIVE AND OBJECTIVE BOX
INTERVAL HPI/OVERNIGHT EVENTS:    CC:    Vital Signs Last 24 Hrs  T(C): 36.5 (07 Nov 2023 04:00), Max: 36.9 (06 Nov 2023 07:46)  T(F): 97.7 (07 Nov 2023 04:00), Max: 98.5 (06 Nov 2023 07:46)  HR: 73 (07 Nov 2023 04:00) (66 - 82)  BP: 145/78 (07 Nov 2023 04:00) (119/74 - 149/89)  BP(mean): --  RR: 18 (07 Nov 2023 04:00) (18 - 18)  SpO2: 94% (07 Nov 2023 04:00) (94% - 98%)        PHYSICAL EXAM:    GENERAL:   CHEST/LUNG:   HEART:   ABDOMEN:   EXTREMITIES:      MEDICATIONS  (STANDING):  busPIRone 30 milliGRAM(s) Oral two times a day  ceFAZolin  Injectable. 2000 milliGRAM(s) IV Push every 8 hours  enoxaparin Injectable 40 milliGRAM(s) SubCutaneous every 24 hours  hydrALAZINE 50 milliGRAM(s) Oral two times a day  metoprolol succinate  milliGRAM(s) Oral daily  oxyCODONE    IR 15 milliGRAM(s) Oral every 4 hours  pantoprazole    Tablet 40 milliGRAM(s) Oral before breakfast  polyethylene glycol 3350 17 Gram(s) Oral daily  senna 2 Tablet(s) Oral at bedtime  sertraline 100 milliGRAM(s) Oral daily    MEDICATIONS  (PRN):  acetaminophen     Tablet .. 650 milliGRAM(s) Oral every 6 hours PRN Temp greater or equal to 38C (100.4F), Mild Pain (1 - 3)  aluminum hydroxide/magnesium hydroxide/simethicone Suspension 30 milliLiter(s) Oral every 4 hours PRN Dyspepsia  melatonin 3 milliGRAM(s) Oral at bedtime PRN Insomnia  ondansetron   Disintegrating Tablet 4 milliGRAM(s) Oral four times a day PRN Nausea  ondansetron Injectable 4 milliGRAM(s) IV Push every 8 hours PRN Nausea and/or Vomiting  oxyCODONE    IR 20 milliGRAM(s) Oral every 4 hours PRN Severe Pain (7 - 10)      Allergies    penicillin (Unknown)  amoxicillin (Anaphylaxis)  citrus (Unknown)  Milk (Unknown)    Intolerances          LABS:                          12.0   4.64  )-----------( 252      ( 06 Nov 2023 06:20 )             34.7     11-06    136  |  97  |  7.6<L>  ----------------------------<  117<H>  3.8   |  25.0  |  0.70    Ca    9.2      06 Nov 2023 06:20        Urinalysis Basic - ( 06 Nov 2023 06:20 )    Color: x / Appearance: x / SG: x / pH: x  Gluc: 117 mg/dL / Ketone: x  / Bili: x / Urobili: x   Blood: x / Protein: x / Nitrite: x   Leuk Esterase: x / RBC: x / WBC x   Sq Epi: x / Non Sq Epi: x / Bacteria: x        RADIOLOGY & ADDITIONAL TESTS:   INTERVAL HPI/OVERNIGHT EVENTS:    CC:  recent bacteremia, dislodged midline, chronic opioid use    No overnight events  denies complaints.     Vital Signs Last 24 Hrs  T(C): 36.5 (07 Nov 2023 04:00), Max: 36.9 (06 Nov 2023 07:46)  T(F): 97.7 (07 Nov 2023 04:00), Max: 98.5 (06 Nov 2023 07:46)  HR: 73 (07 Nov 2023 04:00) (66 - 82)  BP: 145/78 (07 Nov 2023 04:00) (119/74 - 149/89)  BP(mean): --  RR: 18 (07 Nov 2023 04:00) (18 - 18)  SpO2: 94% (07 Nov 2023 04:00) (94% - 98%)        PHYSICAL EXAM:    GENERAL: alert, not in distress  CHEST/LUNG: b/l air entry  HEART: reg  ABDOMEN: soft, non tender  EXTREMITIES: left LE dressing, no edema tenderness right     MEDICATIONS  (STANDING):  busPIRone 30 milliGRAM(s) Oral two times a day  ceFAZolin  Injectable. 2000 milliGRAM(s) IV Push every 8 hours  enoxaparin Injectable 40 milliGRAM(s) SubCutaneous every 24 hours  hydrALAZINE 50 milliGRAM(s) Oral two times a day  metoprolol succinate  milliGRAM(s) Oral daily  oxyCODONE    IR 15 milliGRAM(s) Oral every 4 hours  pantoprazole    Tablet 40 milliGRAM(s) Oral before breakfast  polyethylene glycol 3350 17 Gram(s) Oral daily  senna 2 Tablet(s) Oral at bedtime  sertraline 100 milliGRAM(s) Oral daily    MEDICATIONS  (PRN):  acetaminophen     Tablet .. 650 milliGRAM(s) Oral every 6 hours PRN Temp greater or equal to 38C (100.4F), Mild Pain (1 - 3)  aluminum hydroxide/magnesium hydroxide/simethicone Suspension 30 milliLiter(s) Oral every 4 hours PRN Dyspepsia  melatonin 3 milliGRAM(s) Oral at bedtime PRN Insomnia  ondansetron   Disintegrating Tablet 4 milliGRAM(s) Oral four times a day PRN Nausea  ondansetron Injectable 4 milliGRAM(s) IV Push every 8 hours PRN Nausea and/or Vomiting  oxyCODONE    IR 20 milliGRAM(s) Oral every 4 hours PRN Severe Pain (7 - 10)      Allergies    penicillin (Unknown)  amoxicillin (Anaphylaxis)  citrus (Unknown)  Milk (Unknown)    Intolerances          LABS:                          12.0   4.64  )-----------( 252      ( 06 Nov 2023 06:20 )             34.7     11-06    136  |  97  |  7.6<L>  ----------------------------<  117<H>  3.8   |  25.0  |  0.70    Ca    9.2      06 Nov 2023 06:20        Urinalysis Basic - ( 06 Nov 2023 06:20 )    Color: x / Appearance: x / SG: x / pH: x  Gluc: 117 mg/dL / Ketone: x  / Bili: x / Urobili: x   Blood: x / Protein: x / Nitrite: x   Leuk Esterase: x / RBC: x / WBC x   Sq Epi: x / Non Sq Epi: x / Bacteria: x        RADIOLOGY & ADDITIONAL TESTS:

## 2023-11-07 NOTE — PROGRESS NOTE ADULT - REASON FOR ADMISSION
Displaced midline and need for continuous antibiotics.

## 2023-11-07 NOTE — DISCHARGE NOTE PROVIDER - NSDCMRMEDTOKEN_GEN_ALL_CORE_FT
busPIRone 30 mg oral tablet: 1 tab(s) orally 2 times a day  ceFAZolin 2 g intravenous injection: 2 gram(s) intravenous every 8 hours  hydrALAZINE 50 mg oral tablet: 1 tab(s) orally 2 times a day  metoprolol succinate 100 mg oral capsule, extended release: 1 cap(s) orally once a day  NexIUM 40 mg oral delayed release capsule: 1 cap(s) orally once a day  oxyCODONE 5 mg oral tablet: 3 tab(s) orally every 6 hours as needed for sev pain  polyethylene glycol 3350 oral powder for reconstitution: 17 gram(s) orally once a day  senna leaf extract oral tablet: 2 tab(s) orally once a day (at bedtime)  Zofran 4 mg oral tablet: 1 tab(s) orally 4 times a day as needed for  nausea  Zoloft 100 mg oral tablet: 1 tab(s) orally once a day

## 2023-11-07 NOTE — DISCHARGE NOTE PROVIDER - NSDCCPCAREPLAN_GEN_ALL_CORE_FT
PRINCIPAL DISCHARGE DIAGNOSIS  Diagnosis: Displacement of infusion catheter, initial encounter  Assessment and Plan of Treatment: midline discontinued      SECONDARY DISCHARGE DIAGNOSES  Diagnosis: Bacteremia  Assessment and Plan of Treatment: Completed course of Cefazolin  follow up with ID in office in 1 week    Diagnosis: Anxiety and depression  Assessment and Plan of Treatment: Continue home medications.

## 2023-11-07 NOTE — DISCHARGE NOTE PROVIDER - NSDCFUSCHEDAPPT_GEN_ALL_CORE_FT
Zuleyka Mccabe  [facility]  [facility] [address]  Scheduled Appointment: 11/21/2023

## 2023-11-07 NOTE — PROGRESS NOTE ADULT - ASSESSMENT
48 yr old female with cauda equina syndrome, chronic back pain, hypertension, depression, anxiety, chronic opioid use presented with a dislodged midline. Recently was admitted for an infected left foot wound, ruled out for osteomyelitis and was advised Cefazolin via midline at home by ID, to be given until 11/7. She admitted for continued need for antibiotics, advised negative repeat blood cultures prior to insertion of new midline. Cultures were sent and Cefazolin was continued.     1. Dislodged midline, recent staph hominis bacteremia:  repeat blood cultures negative at 48 hrs  continue Cefazolin, scheduled to complete on 11/7  will need ID follow up in office  monitor CBC and fevers    2. Chronic back pain, prior history of cauda equina syndrome:  Continue Oxycodone prn    3. Hypertension:  Continue Toprol 100 mg QD and Hydralazine 50 mg BID    4. Depression, anxiety:  Continue Zoloft 100 mg QD and Buspar 30 mg BID    5. GERD:  Continue PPI    6. DVT ppx:  Lovenox      Discussed with patient and RN.  Dispo pending cultures and midline placement vs completion of antibiotics on Monday prior to discharge home.       
48 yr old female with cauda equina syndrome, chronic back pain, hypertension, depression, anxiety, chronic opioid use presented with a dislodged midline. Recently was admitted for an infected left foot wound, ruled out for osteomyelitis and was advised Cefazolin via midline at home by ID, to be given until 11/7. She admitted for continued need for antibiotics, advised negative repeat blood cultures prior to insertion of new midline. Cultures were sent and Cefazolin was continued.     1. Dislodged midline, recent staph hominis bacteremia:  repeat blood cultures so far negative at 24 hrs  continue Cefazolin, scheduled to complete on 11/7  will need ID follow up in office  monitor CBC and fevers    2. Chronic back pain, prior history of cauda equina syndrome:  Continue Oxycodone prn    3. Hypertension:  Continue Toprol 100 mg QD and Hydralazine 50 mg BID    4. Depression, anxiety:  Continue Zoloft 100 mg QD and Buspar 30 mg BID    5. GERD:  Continue PPI    6. DVT ppx:  Lovenox      Discussed with patient and RN.  Dispo pending cultures and midline placement vs completion of antibiotics on Monday prior to discharge home.       
48 yr old female with cauda equina syndrome, chronic back pain, hypertension, depression, anxiety, chronic opioid use presented with a dislodged midline. Recently was admitted for an infected left foot wound, ruled out for osteomyelitis and was advised Cefazolin via midline at home by ID, to be given until 11/7. She admitted for continued need for antibiotics, advised negative repeat blood cultures prior to insertion of new midline. Cultures were sent and Cefazolin was continued.     1. Dislodged midline, recent staph hominis bacteremia:  repeat blood cultures negative at 48 hrs  completes Cefazolin today    2. Chronic back pain, prior history of cauda equina syndrome:  Continue Oxycodone prn    3. Hypertension:  Continue Toprol 100 mg QD and Hydralazine 50 mg BID    4. Depression, anxiety:  Continue Zoloft 100 mg QD and Buspar 30 mg BID    5. GERD:  Continue PPI    6. DVT ppx:  Lovenox          
48 yr old female with cauda equina syndrome, chronic back pain, hypertension, depression, anxiety, chronic opioid use presented with a dislodged midline. Recently was admitted for an infected left foot wound, ruled out for osteomyelitis and was advised Cefazolin via midline at home by ID, to be given until 11/7. She admitted for continued need for antibiotics, advised negative repeat blood cultures prior to insertion of new midline. Cultures were sent and Cefazolin was continued.     1. Dislodged midline, recent staph hominis bacteremia:  repeat blood cultures negative at 48 hrs  continue Cefazolin, scheduled to complete on 11/7  will need ID follow up in office  monitor CBC and fevers    2. Chronic back pain, prior history of cauda equina syndrome:  Continue Oxycodone prn    3. Hypertension:  Continue Toprol 100 mg QD and Hydralazine 50 mg BID    4. Depression, anxiety:  Continue Zoloft 100 mg QD and Buspar 30 mg BID    5. GERD:  Continue PPI    6. DVT ppx:  Lovenox      Discussed with patient and RN.  discharge home tomorrow after antibiotics completed.

## 2023-11-07 NOTE — DISCHARGE NOTE NURSING/CASE MANAGEMENT/SOCIAL WORK - PATIENT PORTAL LINK FT
You can access the FollowMyHealth Patient Portal offered by [facility] by registering at the following website: [email or web address]. By joining [facility]’s FollowMyHealth portal, you will also be able to view your health information using other applications (apps) compatible with our system.

## 2023-11-07 NOTE — DISCHARGE NOTE PROVIDER - CARE PROVIDER_API CALL
Zuleyka Mccabe  Infectious Disease  [address] [place] [address]  Phone: [phone]  Fax: [phone]  Follow Up Time:

## 2023-11-07 NOTE — DISCHARGE NOTE PROVIDER - HOSPITAL COURSE
48 yr old female with cauda equina syndrome, chronic back pain, hypertension, depression, anxiety, chronic opioid use presented with a dislodged midline. Recently was admitted for an infected left foot wound, ruled out for osteomyelitis and was advised Cefazolin via midline at home by ID, to be given until 11/7. She admitted for continued need for antibiotics, advised negative repeat blood cultures prior to insertion of new midline. Cultures were sent and Cefazolin was continued. ID consulted. Her course was uneventful. She completed course of Cefazolin on 11/7 and is medically stable for discharge home with close ID follow up as outpatient.

## 2023-11-08 LAB
CULTURE RESULTS: SIGNIFICANT CHANGE UP
SPECIMEN SOURCE: SIGNIFICANT CHANGE UP

## 2023-11-17 ENCOUNTER — APPOINTMENT (OUTPATIENT)
Dept: ORTHOPEDIC SURGERY | Facility: CLINIC | Age: 48
End: 2023-11-17

## 2023-11-20 ENCOUNTER — APPOINTMENT (OUTPATIENT)
Dept: ORTHOPEDIC SURGERY | Facility: CLINIC | Age: 48
End: 2023-11-20
Payer: MEDICAID

## 2023-11-20 DIAGNOSIS — M21.372 FOOT DROP, LEFT FOOT: ICD-10-CM

## 2023-11-20 DIAGNOSIS — M25.50 PAIN IN UNSPECIFIED JOINT: ICD-10-CM

## 2023-11-20 DIAGNOSIS — Q66.10 CONGEN TALIPES CALCANEOVARUS, UNSP FOOT: ICD-10-CM

## 2023-11-20 DIAGNOSIS — R20.0 ANESTHESIA OF SKIN: ICD-10-CM

## 2023-11-20 PROCEDURE — 99214 OFFICE O/P EST MOD 30 MIN: CPT

## 2023-11-21 ENCOUNTER — APPOINTMENT (OUTPATIENT)
Dept: INTERNAL MEDICINE | Facility: CLINIC | Age: 48
End: 2023-11-21
Payer: MEDICAID

## 2023-11-21 VITALS
BODY MASS INDEX: 38.98 KG/M2 | WEIGHT: 220 LBS | OXYGEN SATURATION: 99 % | TEMPERATURE: 97.2 F | SYSTOLIC BLOOD PRESSURE: 129 MMHG | DIASTOLIC BLOOD PRESSURE: 83 MMHG | HEART RATE: 74 BPM | HEIGHT: 63 IN

## 2023-11-21 DIAGNOSIS — R68.83 CHILLS (WITHOUT FEVER): ICD-10-CM

## 2023-11-21 PROCEDURE — 99214 OFFICE O/P EST MOD 30 MIN: CPT | Mod: 25

## 2023-11-21 PROCEDURE — 36415 COLL VENOUS BLD VENIPUNCTURE: CPT

## 2023-11-22 LAB
ALBUMIN SERPL ELPH-MCNC: 4.2 G/DL
ALP BLD-CCNC: 74 U/L
ALT SERPL-CCNC: 8 U/L
ANION GAP SERPL CALC-SCNC: 13 MMOL/L
AST SERPL-CCNC: 16 U/L
BASOPHILS # BLD AUTO: 0.03 K/UL
BASOPHILS NFR BLD AUTO: 0.5 %
BILIRUB SERPL-MCNC: 0.2 MG/DL
BUN SERPL-MCNC: 7 MG/DL
CALCIUM SERPL-MCNC: 9.1 MG/DL
CHLORIDE SERPL-SCNC: 102 MMOL/L
CO2 SERPL-SCNC: 24 MMOL/L
CREAT SERPL-MCNC: 0.73 MG/DL
CRP SERPL-MCNC: 25 MG/L
EGFR: 103 ML/MIN/1.73M2
EOSINOPHIL # BLD AUTO: 0.18 K/UL
EOSINOPHIL NFR BLD AUTO: 2.9 %
ERYTHROCYTE [SEDIMENTATION RATE] IN BLOOD BY WESTERGREN METHOD: 89 MM/HR
HCT VFR BLD CALC: 35.6 %
HGB BLD-MCNC: 11.8 G/DL
IMM GRANULOCYTES NFR BLD AUTO: 0.2 %
LYMPHOCYTES # BLD AUTO: 2.19 K/UL
LYMPHOCYTES NFR BLD AUTO: 35.2 %
MAN DIFF?: NORMAL
MCHC RBC-ENTMCNC: 30.8 PG
MCHC RBC-ENTMCNC: 33.1 GM/DL
MCV RBC AUTO: 93 FL
MONOCYTES # BLD AUTO: 0.49 K/UL
MONOCYTES NFR BLD AUTO: 7.9 %
NEUTROPHILS # BLD AUTO: 3.32 K/UL
NEUTROPHILS NFR BLD AUTO: 53.3 %
PLATELET # BLD AUTO: 302 K/UL
POTASSIUM SERPL-SCNC: 4.3 MMOL/L
PROCALCITONIN SERPL-MCNC: 0.03 NG/ML
PROT SERPL-MCNC: 7.2 G/DL
RBC # BLD: 3.83 M/UL
RBC # FLD: 15 %
SODIUM SERPL-SCNC: 139 MMOL/L
TSH SERPL-ACNC: 1.5 UIU/ML
WBC # FLD AUTO: 6.22 K/UL

## 2023-12-02 ENCOUNTER — NON-APPOINTMENT (OUTPATIENT)
Age: 48
End: 2023-12-02

## 2023-12-06 ENCOUNTER — APPOINTMENT (OUTPATIENT)
Dept: INTERNAL MEDICINE | Facility: CLINIC | Age: 48
End: 2023-12-06

## 2023-12-29 ENCOUNTER — APPOINTMENT (OUTPATIENT)
Dept: INTERNAL MEDICINE | Facility: CLINIC | Age: 48
End: 2023-12-29

## 2024-01-29 NOTE — END OF VISIT
Pt presents ambulatory to ED complaining of flu like symptoms for past 3 days.  Pt is alert and oriented x 4, RR even and unlabored, skin is warm and dry. NAD. Airway patent. Color WNL. Skin WDI. Moves all four extremities. Assessment completed and pt updated on plan of care.       Emergency Department Nursing Plan of Care       The Nursing Plan of Care is developed from the Nursing assessment and Emergency Department Attending provider initial evaluation.  The plan of care may be reviewed in the “ED Provider note”.    The Plan of Care was developed with the following considerations:   Patient / Family readiness to learn indicated by:verbalized understanding  Persons(s) to be included in education: patient  Barriers to Learning/Limitations:None    Signed     Idris Farah RN    1/29/2024   9:04 AM    [>50% of Time Spent on Counseling and Coordination of Care for  ___] : Greater than 50% of the encounter time was spent on counseling and coordination of care for [unfilled] [Time Spent: ___ minutes] : I have spent [unfilled] minutes of face to face time with the patient

## 2024-02-09 ENCOUNTER — EMERGENCY (EMERGENCY)
Facility: HOSPITAL | Age: 49
LOS: 1 days | Discharge: HOME | End: 2024-02-09
Attending: EMERGENCY MEDICINE
Payer: COMMERCIAL

## 2024-02-09 VITALS
HEIGHT: 64 IN | DIASTOLIC BLOOD PRESSURE: 100 MMHG | TEMPERATURE: 98 F | RESPIRATION RATE: 18 BRPM | OXYGEN SATURATION: 100 % | SYSTOLIC BLOOD PRESSURE: 163 MMHG | WEIGHT: 235.01 LBS | HEART RATE: 77 BPM

## 2024-02-09 DIAGNOSIS — Z90.49 ACQUIRED ABSENCE OF OTHER SPECIFIED PARTS OF DIGESTIVE TRACT: Chronic | ICD-10-CM

## 2024-02-09 DIAGNOSIS — Z98.890 OTHER SPECIFIED POSTPROCEDURAL STATES: Chronic | ICD-10-CM

## 2024-02-09 LAB
ALBUMIN SERPL ELPH-MCNC: 3.9 G/DL — SIGNIFICANT CHANGE UP (ref 3.3–5.2)
ALP SERPL-CCNC: 75 U/L — SIGNIFICANT CHANGE UP (ref 40–120)
ALT FLD-CCNC: 6 U/L — SIGNIFICANT CHANGE UP
ANION GAP SERPL CALC-SCNC: 13 MMOL/L — SIGNIFICANT CHANGE UP (ref 5–17)
AST SERPL-CCNC: 17 U/L — SIGNIFICANT CHANGE UP
BASE EXCESS BLDV CALC-SCNC: 3.9 MMOL/L — HIGH (ref -2–3)
BASOPHILS # BLD AUTO: 0.03 K/UL — SIGNIFICANT CHANGE UP (ref 0–0.2)
BASOPHILS NFR BLD AUTO: 0.6 % — SIGNIFICANT CHANGE UP (ref 0–2)
BILIRUB SERPL-MCNC: <0.2 MG/DL — LOW (ref 0.4–2)
BUN SERPL-MCNC: 8 MG/DL — SIGNIFICANT CHANGE UP (ref 8–20)
CA-I SERPL-SCNC: 1.15 MMOL/L — SIGNIFICANT CHANGE UP (ref 1.15–1.33)
CALCIUM SERPL-MCNC: 9 MG/DL — SIGNIFICANT CHANGE UP (ref 8.4–10.5)
CHLORIDE BLDV-SCNC: 103 MMOL/L — SIGNIFICANT CHANGE UP (ref 96–108)
CHLORIDE SERPL-SCNC: 99 MMOL/L — SIGNIFICANT CHANGE UP (ref 96–108)
CO2 SERPL-SCNC: 26 MMOL/L — SIGNIFICANT CHANGE UP (ref 22–29)
CREAT SERPL-MCNC: 0.78 MG/DL — SIGNIFICANT CHANGE UP (ref 0.5–1.3)
EGFR: 95 ML/MIN/1.73M2 — SIGNIFICANT CHANGE UP
EOSINOPHIL # BLD AUTO: 0.12 K/UL — SIGNIFICANT CHANGE UP (ref 0–0.5)
EOSINOPHIL NFR BLD AUTO: 2.3 % — SIGNIFICANT CHANGE UP (ref 0–6)
GAS PNL BLDV: 137 MMOL/L — SIGNIFICANT CHANGE UP (ref 136–145)
GAS PNL BLDV: SIGNIFICANT CHANGE UP
GLUCOSE BLDV-MCNC: 103 MG/DL — HIGH (ref 70–99)
GLUCOSE SERPL-MCNC: 105 MG/DL — HIGH (ref 70–99)
HCG SERPL QL: NEGATIVE — SIGNIFICANT CHANGE UP
HCO3 BLDV-SCNC: 30 MMOL/L — HIGH (ref 22–29)
HCT VFR BLD CALC: 40.3 % — SIGNIFICANT CHANGE UP (ref 34.5–45)
HCT VFR BLDA CALC: 42 % — SIGNIFICANT CHANGE UP
HGB BLD CALC-MCNC: 13.9 G/DL — SIGNIFICANT CHANGE UP (ref 11.7–16.1)
HGB BLD-MCNC: 14 G/DL — SIGNIFICANT CHANGE UP (ref 11.5–15.5)
IMM GRANULOCYTES NFR BLD AUTO: 0.2 % — SIGNIFICANT CHANGE UP (ref 0–0.9)
LACTATE BLDV-MCNC: 1.5 MMOL/L — SIGNIFICANT CHANGE UP (ref 0.5–2)
LYMPHOCYTES # BLD AUTO: 2.09 K/UL — SIGNIFICANT CHANGE UP (ref 1–3.3)
LYMPHOCYTES # BLD AUTO: 40.3 % — SIGNIFICANT CHANGE UP (ref 13–44)
MCHC RBC-ENTMCNC: 32.6 PG — SIGNIFICANT CHANGE UP (ref 27–34)
MCHC RBC-ENTMCNC: 34.7 GM/DL — SIGNIFICANT CHANGE UP (ref 32–36)
MCV RBC AUTO: 93.7 FL — SIGNIFICANT CHANGE UP (ref 80–100)
MONOCYTES # BLD AUTO: 0.42 K/UL — SIGNIFICANT CHANGE UP (ref 0–0.9)
MONOCYTES NFR BLD AUTO: 8.1 % — SIGNIFICANT CHANGE UP (ref 2–14)
NEUTROPHILS # BLD AUTO: 2.52 K/UL — SIGNIFICANT CHANGE UP (ref 1.8–7.4)
NEUTROPHILS NFR BLD AUTO: 48.5 % — SIGNIFICANT CHANGE UP (ref 43–77)
PCO2 BLDV: 55 MMHG — HIGH (ref 39–42)
PH BLDV: 7.34 — SIGNIFICANT CHANGE UP (ref 7.32–7.43)
PLATELET # BLD AUTO: 284 K/UL — SIGNIFICANT CHANGE UP (ref 150–400)
PO2 BLDV: 84 MMHG — HIGH (ref 25–45)
POTASSIUM BLDV-SCNC: 4 MMOL/L — SIGNIFICANT CHANGE UP (ref 3.5–5.1)
POTASSIUM SERPL-MCNC: 3.9 MMOL/L — SIGNIFICANT CHANGE UP (ref 3.5–5.3)
POTASSIUM SERPL-SCNC: 3.9 MMOL/L — SIGNIFICANT CHANGE UP (ref 3.5–5.3)
PROT SERPL-MCNC: 7.4 G/DL — SIGNIFICANT CHANGE UP (ref 6.6–8.7)
RBC # BLD: 4.3 M/UL — SIGNIFICANT CHANGE UP (ref 3.8–5.2)
RBC # FLD: 13.9 % — SIGNIFICANT CHANGE UP (ref 10.3–14.5)
SAO2 % BLDV: 98.1 % — SIGNIFICANT CHANGE UP
SODIUM SERPL-SCNC: 138 MMOL/L — SIGNIFICANT CHANGE UP (ref 135–145)
WBC # BLD: 5.19 K/UL — SIGNIFICANT CHANGE UP (ref 3.8–10.5)
WBC # FLD AUTO: 5.19 K/UL — SIGNIFICANT CHANGE UP (ref 3.8–10.5)

## 2024-02-09 PROCEDURE — 84295 ASSAY OF SERUM SODIUM: CPT

## 2024-02-09 PROCEDURE — 82435 ASSAY OF BLOOD CHLORIDE: CPT

## 2024-02-09 PROCEDURE — 73620 X-RAY EXAM OF FOOT: CPT | Mod: 26,LT

## 2024-02-09 PROCEDURE — 80053 COMPREHEN METABOLIC PANEL: CPT

## 2024-02-09 PROCEDURE — 82803 BLOOD GASES ANY COMBINATION: CPT

## 2024-02-09 PROCEDURE — 83605 ASSAY OF LACTIC ACID: CPT

## 2024-02-09 PROCEDURE — 36415 COLL VENOUS BLD VENIPUNCTURE: CPT

## 2024-02-09 PROCEDURE — 73620 X-RAY EXAM OF FOOT: CPT

## 2024-02-09 PROCEDURE — 85014 HEMATOCRIT: CPT

## 2024-02-09 PROCEDURE — 85025 COMPLETE CBC W/AUTO DIFF WBC: CPT

## 2024-02-09 PROCEDURE — 85018 HEMOGLOBIN: CPT

## 2024-02-09 PROCEDURE — 82330 ASSAY OF CALCIUM: CPT

## 2024-02-09 PROCEDURE — 87040 BLOOD CULTURE FOR BACTERIA: CPT

## 2024-02-09 PROCEDURE — 82962 GLUCOSE BLOOD TEST: CPT

## 2024-02-09 PROCEDURE — 99285 EMERGENCY DEPT VISIT HI MDM: CPT

## 2024-02-09 PROCEDURE — 84132 ASSAY OF SERUM POTASSIUM: CPT

## 2024-02-09 PROCEDURE — 84703 CHORIONIC GONADOTROPIN ASSAY: CPT

## 2024-02-09 PROCEDURE — 82947 ASSAY GLUCOSE BLOOD QUANT: CPT

## 2024-02-09 PROCEDURE — 99283 EMERGENCY DEPT VISIT LOW MDM: CPT

## 2024-02-09 RX ORDER — KETOROLAC TROMETHAMINE 30 MG/ML
30 SYRINGE (ML) INJECTION ONCE
Refills: 0 | Status: DISCONTINUED | OUTPATIENT
Start: 2024-02-09 | End: 2024-02-09

## 2024-02-09 RX ORDER — OXYCODONE HYDROCHLORIDE 5 MG/1
15 TABLET ORAL ONCE
Refills: 0 | Status: DISCONTINUED | OUTPATIENT
Start: 2024-02-09 | End: 2024-02-09

## 2024-02-09 RX ORDER — SODIUM CHLORIDE 9 MG/ML
1000 INJECTION INTRAMUSCULAR; INTRAVENOUS; SUBCUTANEOUS ONCE
Refills: 0 | Status: COMPLETED | OUTPATIENT
Start: 2024-02-09 | End: 2024-02-09

## 2024-02-09 RX ORDER — ACETAMINOPHEN 500 MG
1000 TABLET ORAL ONCE
Refills: 0 | Status: COMPLETED | OUTPATIENT
Start: 2024-02-09 | End: 2024-02-09

## 2024-02-09 RX ADMIN — OXYCODONE HYDROCHLORIDE 15 MILLIGRAM(S): 5 TABLET ORAL at 13:58

## 2024-02-09 RX ADMIN — SODIUM CHLORIDE 1000 MILLILITER(S): 9 INJECTION INTRAMUSCULAR; INTRAVENOUS; SUBCUTANEOUS at 12:25

## 2024-02-09 NOTE — ED PROVIDER NOTE - ATTENDING CONTRIBUTION TO CARE
I personally saw the patient with the resident, and completed the key components of the history and physical exam. I then discussed the management plan with the resident.    49 y/o F with PMH SLE, cauda equina with left distal paraplegia in AFO presented for concern for infected foot wound on the plantar aspect of her left foot, as well as exacerbation of her chronic back pain. She follows with infectious disease, last saw 2 months ago. She denies fevers, chills, nausea, vomiting or urinary symptoms.    PE - NAD, no respiratory distress, left lower extremity paralysis, on the plantar aspect of her foot there is mild skin breakdown without erythema, induration, purulence, discharge or tenderness about 2 cm. 2+ DP pulses.    Likely skin breakdown related to AFO - no obvious sign of infection - patient without infectious symptoms - is understandably anxious about bacteremia which she has had in the past. Will check labs, including cultures, XR foot and apply pressure wound dressing and recommend podiatry follow up as well as infectious disease - non-opioid pain control. Reassurance provided.

## 2024-02-09 NOTE — ED PROVIDER NOTE - CARE PROVIDER_API CALL
Aden Green  Podiatric Medicine and Surgery  [address], [address]  [place] [address]  Phone: [phone]  Fax: [phone]  Follow Up Time: 1-3 Days    Perry Carreno  Infectious Disease  [address] [facility]  [place] [address]  Phone: [phone]  Fax: [phone]  Follow Up Time: 1-3 Days

## 2024-02-09 NOTE — ED PROVIDER NOTE - CARE PROVIDERS DIRECT ADDRESSES
,DirectAddress_Unknown,[email or web address]

## 2024-02-09 NOTE — ED ADULT NURSE NOTE - OBJECTIVE STATEMENT
Pt c/o L foot wound, and back pain shooting down her L leg. States she previously had back surgery that "went wrong" and now has partial paralysis in the L leg. Ambulates with a walker at baseline. C/o chills, diarrhea, and nausea. Denies fevers, SOB, cp, vomiting.

## 2024-02-09 NOTE — ED ADULT TRIAGE NOTE - CHIEF COMPLAINT QUOTE
Pt c/o left leg wound with foul smelling. also that she is dizzy for the past 2 days and seeing double.

## 2024-02-09 NOTE — ED PROVIDER NOTE - CLINICAL SUMMARY MEDICAL DECISION MAKING FREE TEXT BOX
48/F, pmhx with cauda equina syndrome, chronic back pain, hypertension, depression, anxiety, chronic opioid, left distal paraplegia, ?SLEpresented with concerns for infected food wound and back pain with severe pain down the left leg. Pt here for wound check, no signs of infection. Vitally stable wound appears clean, pt advised to dress wound with sterile materials. Blood work including cultures and s/p IVF and toradol. 48/F, pmhx with cauda equina syndrome, chronic back pain, hypertension, depression, anxiety, chronic opioid, left distal paraplegia, ?SLEpresented with concerns for infected food wound and back pain with severe pain down the left leg. Pt here for wound check, no signs of infection. Vitally stable wound appears clean, pt advised to dress wound with sterile materials. Blood work including cultures and s/p IVF and oxycodone 15 IR. Blood work without signs of nfection, no elevated WBC count, lacatet . Blood cultures sent. Pt to be discharged

## 2024-02-09 NOTE — ED PROVIDER NOTE - PROGRESS NOTE DETAILS
Jerrell: Patient iStopped Reference #: [id number]. She was prescribed a 20 day supply of oxycodone 15 mg IR on 1/30/24. Will give home dose of pain medication.

## 2024-02-09 NOTE — ED PROVIDER NOTE - PATIENT PORTAL LINK FT
You can access the FollowMyHealth Patient Portal offered by [facility] by registering at the following website: [email or web address]. By joining [employer]’s FollowMyHealth portal, you will also be able to view your health information using other applications (apps) compatible with our system.

## 2024-02-09 NOTE — ED PROVIDER NOTE - PROVIDER TOKENS
PROVIDER:[TOKEN:[[id number]],FOLLOWUP:[1-3 Days]],PROVIDER:[TOKEN:[[id number]],FOLLOWUP:[1-3 Days]]

## 2024-02-09 NOTE — ED PROVIDER NOTE - PHYSICAL EXAMINATION
Const: Awake, alert and oriented. In no acute distress.   HEENT: NC/AT. Moist mucous membranes.  Eyes: No scleral icterus.  Neck:. Soft and supple.  Cardiac: Regular rate and regular rhythm.  Resp: Speaking in full sentences. No evidence of respiratory distress. No wheezes, rales or rhonchi.  Abd: Soft, non-tender, non-distended. No guarding or rebound.  MSK: ulcer over plantar aspect of forefoot, +fibrosis, indurated borders, no purulent discharge, no erythema, warmth. Ulcer over calcaneus with new skin  Back: Spine midline and non-tender.  Skin: Rash over lower back  Neuro: Awake, alert & oriented x 3. Contractured L. distal limb, no movement or sensation over limb upto hip.

## 2024-02-09 NOTE — ED PROVIDER NOTE - NSFOLLOWUPINSTRUCTIONS_ED_ALL_ED_FT
To f/u with podiatry in 1 week    To f/u with ID in 1 week      Reviewed negative w/u results with pt. Pt reports feeling much improved. Comfortable with plan for d/c. Pt agrees to f/u with pcp. Return instructions given, questions answered.

## 2024-02-09 NOTE — ED PROVIDER NOTE - OBJECTIVE STATEMENT
48/F, pmhx with cauda equina syndrome, chronic back pain, hypertension, depression, anxiety, chronic opioid, left distal paraplegia, ?SLEpresented with concerns for infected food wound and back pain with severe pain down the left leg. Reports being worried about the foot being infected, has not had any wound care since discharge, wrapped toilet paper around the foot prior to coming here. Reports some dizziness, and discolouration over the back which is tender. No chest pain, SOB, facial asymmetry, blurring of vison.

## 2024-02-09 NOTE — ED ADULT NURSE NOTE - NSFALLRISKINTERV_ED_ALL_ED

## 2024-02-09 NOTE — ED PROVIDER NOTE - WR ORDER STATUS 1
Performed Metronidazole Counseling:  I discussed with the patient the risks of metronidazole including but not limited to seizures, nausea/vomiting, a metallic taste in the mouth, nausea/vomiting and severe allergy.

## 2024-02-14 LAB
CULTURE RESULTS: SIGNIFICANT CHANGE UP
CULTURE RESULTS: SIGNIFICANT CHANGE UP
SPECIMEN SOURCE: SIGNIFICANT CHANGE UP
SPECIMEN SOURCE: SIGNIFICANT CHANGE UP

## 2024-04-26 NOTE — ED ADULT NURSE NOTE - CAS DISCH CONDITION
Problem: Chronic Conditions and Co-morbidities  Goal: Patient's chronic conditions and co-morbidity symptoms are monitored and maintained or improved  Outcome: Progressing     Problem: Wound:  Goal: Will show signs of wound healing; wound closure and no evidence of infection  Description: Will show signs of wound healing; wound closure and no evidence of infection  Outcome: Progressing     Problem: Falls - Risk of:  Goal: Will remain free from falls  Description: Will remain free from falls  Outcome: Progressing      Improved

## 2024-05-21 ENCOUNTER — APPOINTMENT (OUTPATIENT)
Dept: INTERNAL MEDICINE | Facility: CLINIC | Age: 49
End: 2024-05-21

## 2024-09-14 ENCOUNTER — EMERGENCY (EMERGENCY)
Facility: HOSPITAL | Age: 49
LOS: 1 days | Discharge: HOME | End: 2024-09-14
Attending: EMERGENCY MEDICINE
Payer: COMMERCIAL

## 2024-09-14 VITALS
WEIGHT: 220.02 LBS | HEART RATE: 88 BPM | SYSTOLIC BLOOD PRESSURE: 147 MMHG | DIASTOLIC BLOOD PRESSURE: 92 MMHG | TEMPERATURE: 98 F | RESPIRATION RATE: 18 BRPM | HEIGHT: 63 IN | OXYGEN SATURATION: 96 %

## 2024-09-14 DIAGNOSIS — Z90.49 ACQUIRED ABSENCE OF OTHER SPECIFIED PARTS OF DIGESTIVE TRACT: Chronic | ICD-10-CM

## 2024-09-14 DIAGNOSIS — Z98.890 OTHER SPECIFIED POSTPROCEDURAL STATES: Chronic | ICD-10-CM

## 2024-09-14 PROCEDURE — 99284 EMERGENCY DEPT VISIT MOD MDM: CPT

## 2024-09-14 PROCEDURE — 99283 EMERGENCY DEPT VISIT LOW MDM: CPT

## 2024-09-14 RX ORDER — OXYCODONE HYDROCHLORIDE 5 MG/1
1 TABLET ORAL
Qty: 20 | Refills: 0
Start: 2024-09-14 | End: 2024-09-18

## 2024-09-14 RX ORDER — OXYCODONE HYDROCHLORIDE 30 MG/1
1 TABLET ORAL
Qty: 12 | Refills: 0
Start: 2024-09-14 | End: 2025-06-02

## 2024-09-14 RX ORDER — OXYCODONE HYDROCHLORIDE 5 MG/1
15 TABLET ORAL ONCE
Refills: 0 | Status: DISCONTINUED | OUTPATIENT
Start: 2024-09-14 | End: 2024-09-14

## 2024-09-14 RX ADMIN — OXYCODONE HYDROCHLORIDE 15 MILLIGRAM(S): 5 TABLET ORAL at 13:03

## 2024-09-14 NOTE — ED PROVIDER NOTE - ENMT NEGATIVE STATEMENT, MLM
Ears: no ear pain and no hearing problems. Nose: no nasal congestion and no nasal drainage. Mouth/Throat: no dysphagia, no hoarseness and no throat pain. Neck: no lumps, no pain, no stiffness and no swollen glands. + facial pain

## 2024-09-14 NOTE — ED PROVIDER NOTE - OBJECTIVE STATEMENT
49-year-old female with past ministry of rheumatoid arthritis, lupus, trigeminal neuralgia presents with facial pain.  Patient reports she has been out of her oxycodone for her trigeminal neuralgia over the past several days and she is having worsening bilateral but left greater than right facial pain.  Pain is sharp, constant,  worse with touching the area, no trauma, consistent with prior episodes of trigeminal neuralgia.   No weakness, facial droop, slurred speech, blurry vision

## 2024-09-14 NOTE — ED PROVIDER NOTE - PATIENT PORTAL LINK FT
You can access the FollowMyHealth Patient Portal offered by [facility] by registering at the following website: [email or web address]. By joining [employer]’s FollowMyHealth portal, you will also be able to view your health information using other applications (apps) compatible with our system.

## 2024-09-14 NOTE — ED ADULT TRIAGE NOTE - TEMPERATURE IN CELSIUS (DEGREES C)
[id number] - [place], [place] - [address]   
· Med Name & Dose: nortriptyline  · Last refill was 3-7-19 Number of Refills sent: 3  · Quantity of medication given 60 tab  · Last visit for that condition 8-26-19  · Date of next appt: 3/9/2020  · Date of any Lab results pertaining to medication: n/a      8-26-19 appointment was acute.  Cannot refill per protocol        
36.4

## 2024-09-25 ENCOUNTER — APPOINTMENT (OUTPATIENT)
Dept: ORTHOPEDIC SURGERY | Facility: CLINIC | Age: 49
End: 2024-09-25
Payer: MEDICAID

## 2024-09-25 DIAGNOSIS — M21.372 FOOT DROP, LEFT FOOT: ICD-10-CM

## 2024-09-25 DIAGNOSIS — M25.50 PAIN IN UNSPECIFIED JOINT: ICD-10-CM

## 2024-09-25 DIAGNOSIS — R20.0 ANESTHESIA OF SKIN: ICD-10-CM

## 2024-09-25 DIAGNOSIS — Q66.10 CONGEN TALIPES CALCANEOVARUS, UNSP FOOT: ICD-10-CM

## 2024-09-25 PROCEDURE — 99214 OFFICE O/P EST MOD 30 MIN: CPT

## 2024-09-25 NOTE — ADDENDUM
[FreeTextEntry1] : I, Franklyn White, acted solely as a scribe for Dr. Franklyn Li on this date 09/25/2024  .   All medical record entries made by the Scribe were at my, Dr. Franklyn Li, direction and personally dictated by me on 09/25/2024 . I have reviewed the chart and agree that the record accurately reflects my personal performance of the history, physical exam, assessment and plan. I have also personally directed, reviewed, and agreed with the chart.

## 2024-09-25 NOTE — DISCUSSION/SUMMARY
[de-identified] : Today I had a lengthy discussion with the patient regarding their left ankle pain. I have addressed all the patient's concerns surrounding the pathology of their condition. Conservative and surgical management was discussed.  Risks of surgery were discussed. Due to the patient's consistent use of cigarettes, conservative management will be pursued.  I recommend that the patient utilize ice, NSAIDS PRN, and heat. They can also elevate their LLE above the level of the heart. I encouraged the patient to quit smoking and educated them about the risks that smoking has on bone healing. A discussion was had about utilizing custom orthotics. The patient was educated about custom orthotics in the office today. Verito was prescribed in the office today.  The patient understood and verbally agreed to the treatment plan. All of their questions were answered and they were satisfied with the visit. The patient should call the office if they have any questions or experience worsening symptoms.  FU in 6 months.

## 2024-09-25 NOTE — HISTORY OF PRESENT ILLNESS
[FreeTextEntry1] : 9/25/2024: The patient is a 49-year-old female who presents for follow-up evaluation of her left foot pain. She reports that her symptoms have not improved since her last visit to the office. She went to the hospital due to wounds at the bottom of her feet with concerns of sepsis this year. She is still smoking but has cut down significantly. She is down to 5 cigarettes daily. She presents to the office in a walker and splint.  11/20/23: The patient is a 47 y/o female presenting for a follow up evaluation of left foot. During her last visit here she was fitted for an AFO brace for her left foot drop sustained as a complication from cauda equina surgery. She remains with significant muscle weakness in her LLE at this time. Patient states that soon after the brace fitting she developed a contact dermatitis and now needs to keep the brace from touching her skin. Patient is here to follow up after hospital admission at [facility] for bacteremia with subsequent drainage from bilateral plantar feet. She was under the care of ID inpatient and received a midline with course of IV abx. She had subsequent midline infection after being discharged from hospital and was readmitted. She has since finished her IV abx course and midline has been removed. She is continuing to follow up with ID for this matter. She is here today to check on progress of her LLE deformity since the AFO brace placement.   5/24/23: The patient is a 48 year old female who presents to the office for an initial evaluation of left foot pain. She has a surgical history of emergent Cauda Equina back surgery in 2020 at [facility]. She had a second surgery at the same hospital as well.   Patient smokes.

## 2024-09-25 NOTE — PHYSICAL EXAM
[de-identified] : LEFT foot exam  Patient presents in AFO brace LLE  General: Alert and oriented x3.  In no acute distress.  Pleasant in nature with a normal affect.  No apparent respiratory distress.  Erythema, Warmth, Rubor: Negative Swelling: Negative  Diffuse Callus formation on plantar aspect of left foot. No open wounds or drainage identified. No signs of active infection in bilateral feet  ROM Ankle- unable to perform  ROM of digits- unable to perform   Pes Planus: Negative +Cavovarus deformity of foot   Bunion: Negative Ruskin Bunion (Bunionette): Negative Hammer Toe Deformity/Deformities: Negative  Tenderness to Palpation:  1. Heel Pain: Negative 2. Midfoot Pain: Negative 3. First MTP Joint: Negative 4. Lis Franc Joint: Negative  Tenderness Metatarsals: 1st MT: Negative 2nd MT: Negative 3rd MT: Negative 4th MT: Negative 5th MT: Negative Base of the 5th MT: Negative  Ligament Pain: 1. Lis Franc Ligament: Negative 2. Plantar Fascia Ligament: Negative  Strength: not tested   Pulses: 2+ DP/PT Pulses  Capillary Refill Toes: <2 seconds  Additional Test: 1. García's Squeeze Test: Negative 2. Calcaneal Squeeze Test: Negative [de-identified] : ACC: [id number] EXAM: MR FOOT LT ORDERED BY: RALPH MAYFIELD  PROCEDURE DATE: 10/28/2023  INTERPRETATION: MR FOOT LEFT  HISTORY: Concern for osteomyelitis.  TECHNIQUE: Multiplanar MRI of the left forefoot was performed without contrast.  COMPARISON: Left foot x-rays dated 10/24/2023.  FINDINGS:  OSSEOUS STRUCTURES Fractures/Stress Reactions: Nondisplaced transverse fracture involves the 1st proximal phalanx base.. Marrow: Soft tissue wound appears to be present along the medial margin of the 5th metatarsal phalangeal joint with slight subcortical edema of the metatarsal head but no confluent T1 marrow replacement. Faint presumed reactive or stress-related marrow edema is noted involving the partially imaged talar head and navicular.  INTERPHALANGEAL JOINTS Articular Surfaces: Preserved. Effusions/Synovitis: None.  1ST METATARSOPHALANGEAL JOINT Articular Surfaces: Preserved. Effusions/Synovitis: None. Sesamoids: Intact.  LESSER METATARSOPHALANGEAL JOINTS Articular Surfaces: Preserved. Effusions/Synovitis: None.  TARSOMETATARSAL JOINTS Articular Surfaces: Preserved. Effusions/Synovitis: None.  INTERTARSAL JOINTS Articular Surfaces: Preserved. Effusions/Synovitis: None.  INTERMETATARSAL SPACES Neuromas: None. Bursa: Slight 1st through 3rd webspace bursa are noted.  LISFRANC LIGAMENT Intact.  TENDONS Flexor Tendons: Intact. Extensor Tendons: Intact.  DISTAL PLANTAR FASCIA Intact.  SOFT TISSUES Musculature: There is slight edema of the abductor hallucis muscle and slight edema extending involving the plantar margin of the abductor digiti minimi muscle. Changes may be traumatic or neuropathologic Subcutaneous Tissues: Mild to moderate scattered subcutis edema is present. Soft tissue wound appears to be present along the lateral margin of the 5th metatarsophalangeal joint. No abscess or gas is seen.  IMPRESSION: 1. Soft tissue wound appears to be present along the lateral margin of the 5th metatarsophalangeal joint with slight subcortical edema of the 5th metatarsal head but no T1 marrow replacement. Changes are likely reactive although early infection cannot be absolutely excluded. 2. Nondisplaced fracture involves the 1st proximal phalanx base.  --- End of Report ---      SHAHANA REGALADO MD; Attending Radiologist This document has been electronically signed. Oct 28 2023 12:44PM Notes Patient History (25)

## 2024-10-01 ENCOUNTER — APPOINTMENT (OUTPATIENT)
Dept: INFECTIOUS DISEASE | Facility: CLINIC | Age: 49
End: 2024-10-01
Payer: MEDICAID

## 2024-10-01 VITALS
WEIGHT: 220 LBS | TEMPERATURE: 98 F | DIASTOLIC BLOOD PRESSURE: 92 MMHG | HEIGHT: 63 IN | BODY MASS INDEX: 38.98 KG/M2 | HEART RATE: 70 BPM | SYSTOLIC BLOOD PRESSURE: 150 MMHG | OXYGEN SATURATION: 96 % | RESPIRATION RATE: 16 BRPM

## 2024-10-01 DIAGNOSIS — R68.83 CHILLS (WITHOUT FEVER): ICD-10-CM

## 2024-10-01 DIAGNOSIS — R61 GENERALIZED HYPERHIDROSIS: ICD-10-CM

## 2024-10-01 DIAGNOSIS — Z72.0 TOBACCO USE: ICD-10-CM

## 2024-10-01 PROCEDURE — 99215 OFFICE O/P EST HI 40 MIN: CPT

## 2024-10-01 PROCEDURE — 36415 COLL VENOUS BLD VENIPUNCTURE: CPT

## 2024-10-01 NOTE — PHYSICAL EXAM
[General Appearance - Alert] : alert [General Appearance - In No Acute Distress] : in no acute distress [Sclera] : the sclera and conjunctiva were normal [PERRL With Normal Accommodation] : pupils were equal in size, round, reactive to light [Extraocular Movements] : extraocular movements were intact [Outer Ear] : the ears and nose were normal in appearance [Neck Appearance] : the appearance of the neck was normal [Costovertebral Angle Tenderness] : no CVA tenderness [Musculoskeletal - Swelling] : no joint swelling [] : no rash [No Focal Deficits] : no focal deficits [Oriented To Time, Place, And Person] : oriented to person, place, and time [Affect] : the affect was normal

## 2024-10-01 NOTE — ASSESSMENT
[FreeTextEntry1] : 49-year-old female here for follow-up of left foot infection status post cefazolin  Chills with no fever Left foot wound. now resolved   Recommendation: Currently off antibiotics Will check Blood cultures, CBC, CMP, CRP, procalcitonin level Advised to quit smoking. No need for further antibiotic therapy.  Follow-up as needed or after work up  Counseling included lab results, differential diagnosis, treatment options, risks and benefits, lifestyle changes, current condition, medications and dose adjustments. The patient was interactive attentive and asked questions and verbalized understanding.

## 2024-10-01 NOTE — HISTORY OF PRESENT ILLNESS
[FreeTextEntry1] : 49-year-old female with a complicated medical h/o L4/5 decompression done on January 13, 2019 at [facility]. At that time she had cauda equina syndrome and was admitted in February 2019 for wound infection from that surgery. She had MSSA growing from the incision site and had a I&D done on February 22, 2019. She was discharged on IV cefazolin which she completed in April 4, 2019. She followed up with us after that time. Stopped following up after April 21 2020. She recently was seen at [facility] emergency room for skin peeling. She was discharged from the emergency room after her labs and work-up was within normal limits. Patient is concerned for any infection since she had a severe infection in 2019 and is traumatized from that, so was seen in the office on 5/16/23 and had a negative work up. Patient was seen in the office on 10/23 with c/o chills and drainage from the left foot. Blood cultures and blood work was obtained in the office. Blood cultures with [facility] which resulted as CoNS  (S) Cefazolin. Patient was discharged that hospitalization on 11/1/23 with a Midline.  Patient was transferred to hospital due to midline displacement on November second and completed a course of IV antibiotic therapy from November 7, 2023 when she was discharged from the hospital.  Patient reports to continue to have intermittent chills since that time.  Reports no fever recorded at home. Was seen in the office since and no source of infection was found. Started to have chills again. No sick contacts. No recorded fevers.  Denies any other complaints.

## 2024-10-02 NOTE — ED STATDOCS - SKIN, MLM
Well Visit, Ages 18 to 65: Care Instructions  Well visits can help you stay healthy. Your doctor has checked your overall health and may have suggested ways to take good care of yourself. Your doctor also may have recommended tests. You can help prevent illness with healthy eating, good sleep, vaccinations, regular exercise, and other steps.    Get the tests that you and your doctor decide on. Depending on your age and risks, examples might include screening for diabetes; hepatitis C; HIV; and cervical, breast, lung, and colon cancer. Screening helps find diseases before any symptoms appear.   Eat healthy foods. Choose fruits, vegetables, whole grains, lean protein, and low-fat dairy foods. Limit saturated fat and reduce salt.     Limit alcohol. Men should have no more than 2 drinks a day. Women should have no more than 1. For some people, no alcohol is the best choice.   Exercise. Get at least 30 minutes of exercise on most days of the week. Walking can be a good choice.     Reach and stay at your healthy weight. This will lower your risk for many health problems.   Take care of your mental health. Try to stay connected with friends, family, and community, and find ways to manage stress.     If you're feeling depressed or hopeless, talk to someone. A counselor can help. If you don't have a counselor, talk to your doctor.   Talk to your doctor if you think you may have a problem with alcohol or drug use. This includes prescription medicines, marijuana, and other drugs.     Avoid tobacco and nicotine: Don't smoke, vape, or chew. If you need help quitting, talk to your doctor.   Practice safer sex. Getting tested, using condoms or dental dams, and limiting sex partners can help prevent STIs.     Use birth control if it's important to you to prevent pregnancy. Talk with your doctor about your choices and what might be best for you.   Prevent problems where you can. Protect your skin from too much sun, wash your 
skin normal color for race, warm, dry and intact.

## 2024-10-22 ENCOUNTER — APPOINTMENT (OUTPATIENT)
Dept: GASTROENTEROLOGY | Facility: CLINIC | Age: 49
End: 2024-10-22
Payer: MEDICAID

## 2024-10-22 VITALS
HEIGHT: 63 IN | TEMPERATURE: 97.8 F | OXYGEN SATURATION: 99 % | HEART RATE: 136 BPM | DIASTOLIC BLOOD PRESSURE: 80 MMHG | SYSTOLIC BLOOD PRESSURE: 140 MMHG

## 2024-10-22 DIAGNOSIS — Z87.19 PERSONAL HISTORY OF OTHER DISEASES OF THE DIGESTIVE SYSTEM: ICD-10-CM

## 2024-10-22 DIAGNOSIS — R11.2 NAUSEA WITH VOMITING, UNSPECIFIED: ICD-10-CM

## 2024-10-22 DIAGNOSIS — K59.00 CONSTIPATION, UNSPECIFIED: ICD-10-CM

## 2024-10-22 DIAGNOSIS — K21.9 GASTRO-ESOPHAGEAL REFLUX DISEASE W/OUT ESOPHAGITIS: ICD-10-CM

## 2024-10-22 PROCEDURE — 99204 OFFICE O/P NEW MOD 45 MIN: CPT

## 2024-10-22 RX ORDER — OXYCODONE HYDROCHLORIDE 15 MG/1
15 TABLET ORAL
Refills: 0 | Status: ACTIVE | COMMUNITY

## 2024-10-22 RX ORDER — FENTANYL 12 UG/H
12 PATCH, EXTENDED RELEASE TRANSDERMAL
Refills: 0 | Status: ACTIVE | COMMUNITY

## 2024-10-23 RX ORDER — LINACLOTIDE 72 UG/1
72 CAPSULE, GELATIN COATED ORAL DAILY
Qty: 30 | Refills: 0 | Status: DISCONTINUED | COMMUNITY
Start: 2024-10-22 | End: 2024-10-23

## 2024-10-23 RX ORDER — LINACLOTIDE 72 UG/1
72 CAPSULE, GELATIN COATED ORAL
Qty: 30 | Refills: 0 | Status: ACTIVE | COMMUNITY
Start: 2024-10-23 | End: 2024-11-22

## 2024-10-23 RX ORDER — PANTOPRAZOLE 40 MG/1
40 TABLET, DELAYED RELEASE ORAL
Qty: 30 | Refills: 1 | Status: DISCONTINUED | COMMUNITY
Start: 2024-10-22 | End: 2024-10-23

## 2024-10-23 RX ORDER — PANTOPRAZOLE 40 MG/1
40 TABLET, DELAYED RELEASE ORAL
Qty: 30 | Refills: 1 | Status: ACTIVE | COMMUNITY
Start: 2024-10-23 | End: 1900-01-01

## 2024-10-25 RX ORDER — FOAM BANDAGE 7" X 7"
BANDAGE TOPICAL
Qty: 10 | Refills: 0 | Status: ACTIVE | COMMUNITY
Start: 2024-10-25 | End: 1900-01-01

## 2024-10-26 NOTE — ED ADULT TRIAGE NOTE - HEIGHT IN INCHES
3 [Consultation] : a consultation visit [FreeTextEntry1] :  referred by Dr. Pappas for GI Evaluation

## 2024-12-03 NOTE — PLAN
PANCHO attempted to reach pt by phone today in response to her VM message. There was no answer; PANCHO lvm . SW also attempted to reach pt by phone last Friday (11/29) following a message that week as well. There was no answer and pt's mailbox was full at that time.   
[FreeTextEntry1] : 1-  Physical Exam: done \par \par 2-  HTN : continue with blood pressure medications;  monitor b/p, bring records.  Encouraged to go to ER if having severe H/as, cp, palpitation, sob, dizziness,   \par \par 3-  DM:  Continue Diabetic medication,  Monitor Bs, bring record.  \par \par 4-  Obesity:  Discussed diet and exercise, encouraged to weight self weekly, smaller protions 3-4 times/day.  Encouraged food diary.\par \par 5-  smoker:  Smoking cessation counseling, discussed the risk of smoking and the benefit of tobacco cessation.  Encouraged to go to tobacco cessation classes.  information given for [facility] [phone], [facility]  [phone],  [facility] [phone].\par \par 6-  severe Chronic back pain:  Continue with Pain medication, continue to follow up with neurologist,  has appoint this afternnon.\par \par 7-  Health maintenance:  Life style modification, healthy diet, low salts, fats, sweets, less juices/sodas/fried food. More water, fruits, vegetables.  Encouraged food diary, weight self weekly, eat smaller portions 3-4 times/day.  Exercise, walking 30-60 minutes/day.  Encouraged to alternate cold compress with heating pads. 20 minutes each.  Back exercise taught, encouraged to exercise.  Discussed dental care, floss/brush after each meals, dental check every 6 months.  Annual eyes check.  Gyn follow up for well women exam.  Reported had labs done few wks ago, will get labs report.\par \par 8-  PTSD/anxiety:  Continue with psych medications, continue to f/u with psych.\par \par 9-  F/U in 10-12 wks or prn

## 2024-12-20 NOTE — ASU PATIENT PROFILE, ADULT - FALL HARM RISK - HARM RISK INTERVENTIONS

## 2024-12-20 NOTE — ASU PATIENT PROFILE, ADULT - AVIAN FLU SYMPTOMS
Case d/w MD.  Continue 60mg for the full 30 days then reduce per 10mg increments every 2-3 weeks. This RN messaged pt back on [employer] and asked her to reach out to us when closer to the 30 day katalina. No

## 2024-12-23 ENCOUNTER — APPOINTMENT (OUTPATIENT)
Dept: GASTROENTEROLOGY | Facility: HOSPITAL | Age: 49
End: 2024-12-23

## 2024-12-27 ENCOUNTER — APPOINTMENT (OUTPATIENT)
Dept: GASTROENTEROLOGY | Facility: CLINIC | Age: 49
End: 2024-12-27
Payer: MEDICAID

## 2024-12-27 VITALS
DIASTOLIC BLOOD PRESSURE: 70 MMHG | WEIGHT: 220 LBS | TEMPERATURE: 97.8 F | BODY MASS INDEX: 38.98 KG/M2 | HEIGHT: 63 IN | OXYGEN SATURATION: 99 % | SYSTOLIC BLOOD PRESSURE: 120 MMHG | HEART RATE: 81 BPM

## 2024-12-27 DIAGNOSIS — R11.2 NAUSEA WITH VOMITING, UNSPECIFIED: ICD-10-CM

## 2024-12-27 DIAGNOSIS — K21.9 GASTRO-ESOPHAGEAL REFLUX DISEASE W/OUT ESOPHAGITIS: ICD-10-CM

## 2024-12-27 DIAGNOSIS — K59.00 CONSTIPATION, UNSPECIFIED: ICD-10-CM

## 2024-12-27 PROCEDURE — 99214 OFFICE O/P EST MOD 30 MIN: CPT

## 2024-12-27 RX ORDER — PANTOPRAZOLE 40 MG/1
40 TABLET, DELAYED RELEASE ORAL TWICE DAILY
Qty: 60 | Refills: 2 | Status: ACTIVE | COMMUNITY
Start: 2024-12-27 | End: 1900-01-01

## 2024-12-27 RX ORDER — LUBIPROSTONE 8 UG/1
8 CAPSULE ORAL TWICE DAILY
Qty: 60 | Refills: 1 | Status: ACTIVE | COMMUNITY
Start: 2024-12-27 | End: 1900-01-01

## 2025-01-28 ENCOUNTER — NON-APPOINTMENT (OUTPATIENT)
Age: 50
End: 2025-01-28

## 2025-03-03 ENCOUNTER — APPOINTMENT (OUTPATIENT)
Dept: GASTROENTEROLOGY | Facility: HOSPITAL | Age: 50
End: 2025-03-03
Payer: MEDICAID

## 2025-03-03 ENCOUNTER — RESULT REVIEW (OUTPATIENT)
Age: 50
End: 2025-03-03

## 2025-03-03 ENCOUNTER — OUTPATIENT (OUTPATIENT)
Dept: OUTPATIENT SERVICES | Facility: HOSPITAL | Age: 50
LOS: 1 days | Discharge: HOME | End: 2025-03-03
Payer: MEDICAID

## 2025-03-03 ENCOUNTER — TRANSCRIPTION ENCOUNTER (OUTPATIENT)
Age: 50
End: 2025-03-03

## 2025-03-03 VITALS
SYSTOLIC BLOOD PRESSURE: 138 MMHG | TEMPERATURE: 98 F | HEART RATE: 83 BPM | OXYGEN SATURATION: 99 % | DIASTOLIC BLOOD PRESSURE: 81 MMHG | RESPIRATION RATE: 16 BRPM | HEIGHT: 63 IN | WEIGHT: 220.02 LBS

## 2025-03-03 VITALS
OXYGEN SATURATION: 100 % | HEART RATE: 66 BPM | SYSTOLIC BLOOD PRESSURE: 129 MMHG | RESPIRATION RATE: 14 BRPM | DIASTOLIC BLOOD PRESSURE: 72 MMHG

## 2025-03-03 DIAGNOSIS — Z98.890 OTHER SPECIFIED POSTPROCEDURAL STATES: Chronic | ICD-10-CM

## 2025-03-03 DIAGNOSIS — K21.9 GASTRO-ESOPHAGEAL REFLUX DISEASE WITHOUT ESOPHAGITIS: ICD-10-CM

## 2025-03-03 DIAGNOSIS — Z90.49 ACQUIRED ABSENCE OF OTHER SPECIFIED PARTS OF DIGESTIVE TRACT: Chronic | ICD-10-CM

## 2025-03-03 PROCEDURE — 88305 TISSUE EXAM BY PATHOLOGIST: CPT | Mod: 26

## 2025-03-03 PROCEDURE — 43239 EGD BIOPSY SINGLE/MULTIPLE: CPT

## 2025-03-03 NOTE — ASU DISCHARGE PLAN (ADULT/PEDIATRIC) - NS MD DC FALL RISK RISK
For information on Fall & Injury Prevention, visit: [email or web address] OR  [email or web address] OR  https://www.cdc.gov/steadi/patient.html

## 2025-03-03 NOTE — PRE PROCEDURE NOTE - PRE PROCEDURE EVALUATION
Attending Physician:         Juan Nicholson MD                   Procedure:    Esophagogastroduodenoscopy    Indication for Procedure:   Gastroesophageal reflux disease, Nausea and vomiting  ________________________________________________________  PAST MEDICAL & SURGICAL HISTORY:  Dental infection      Hypertension, unspecified type      Diabetes mellitus      Sciatica      Chronic low back pain, unspecified back pain laterality, unspecified whether sciatica present      History of cholecystectomy      H/O Spinal surgery        ALLERGIES:  amoxicillin (Anaphylaxis)  citrus (Unknown)  Milk (Unknown)  penicillin (Unknown)    HOME MEDICATIONS:  busPIRone 30 mg oral tablet: 1 tab(s) orally 2 times a day  hydrALAZINE 50 mg oral tablet: 1 tab(s) orally 2 times a day  metoprolol succinate 100 mg oral capsule, extended release: 1 cap(s) orally once a day  NexIUM 40 mg oral delayed release capsule: 1 cap(s) orally once a day  oxyCODONE 15 mg oral tablet: 1 tab(s) orally every 4 hours  sertraline 100 mg oral tablet: 1 tab(s) orally once a day  Zofran 4 mg oral tablet: 1 tab(s) orally 4 times a day as needed for  nausea    AICD/PPM: no    PHYSICAL EXAMINATION:    T(C): 98.1  HR: 83  BP: 138/83  RR: 16  SpO2: 98%    Constitutional: In no acute distress  HEENT: normocephalic/atraumatic, anicteric sclera  Neck:  No JVD  Respiratory: bilateral air entry, normal respiratory effort  Cardiovascular: normal rate  Gastrointestinal: +BS, soft, non-tender, non-distended  Extremities: No peripheral edema  Neurological: alert and oriented x3  Psychiatric: Normal mood, normal affect    ASA Class: III    COMMENTS:    The patient is a suitable candidate for the planned procedure unless box checked [ ]  No, explain:

## 2025-03-03 NOTE — ASU DISCHARGE PLAN (ADULT/PEDIATRIC) - FINANCIAL ASSISTANCE
[facility] provides services at a reduced cost to those who are determined to be eligible through [facility]’s financial assistance program. Information regarding [facility]’s financial assistance program can be found by going to [email or web address] or by calling [phone].

## 2025-03-05 LAB — SURGICAL PATHOLOGY STUDY: SIGNIFICANT CHANGE UP

## 2025-03-24 ENCOUNTER — APPOINTMENT (OUTPATIENT)
Dept: GASTROENTEROLOGY | Facility: CLINIC | Age: 50
End: 2025-03-24

## 2025-04-04 ENCOUNTER — NON-APPOINTMENT (OUTPATIENT)
Age: 50
End: 2025-04-04

## 2025-04-04 DIAGNOSIS — R19.7 DIARRHEA, UNSPECIFIED: ICD-10-CM

## 2025-04-23 ENCOUNTER — APPOINTMENT (OUTPATIENT)
Dept: GASTROENTEROLOGY | Facility: CLINIC | Age: 50
End: 2025-04-23
Payer: MEDICAID

## 2025-04-23 VITALS
SYSTOLIC BLOOD PRESSURE: 110 MMHG | HEART RATE: 88 BPM | HEIGHT: 63 IN | TEMPERATURE: 97.5 F | OXYGEN SATURATION: 98 % | DIASTOLIC BLOOD PRESSURE: 72 MMHG

## 2025-04-23 DIAGNOSIS — K31.89 OTHER DISEASES OF STOMACH AND DUODENUM: ICD-10-CM

## 2025-04-23 DIAGNOSIS — R11.2 NAUSEA WITH VOMITING, UNSPECIFIED: ICD-10-CM

## 2025-04-23 DIAGNOSIS — R10.9 UNSPECIFIED ABDOMINAL PAIN: ICD-10-CM

## 2025-04-23 DIAGNOSIS — K21.9 GASTRO-ESOPHAGEAL REFLUX DISEASE W/OUT ESOPHAGITIS: ICD-10-CM

## 2025-04-23 PROCEDURE — 99214 OFFICE O/P EST MOD 30 MIN: CPT

## 2025-04-23 RX ORDER — RABEPRAZOLE SODIUM 20 MG/1
20 TABLET, DELAYED RELEASE ORAL DAILY
Qty: 30 | Refills: 4 | Status: ACTIVE | COMMUNITY
Start: 2025-04-23 | End: 1900-01-01

## 2025-04-29 ENCOUNTER — EMERGENCY (EMERGENCY)
Facility: HOSPITAL | Age: 50
LOS: 1 days | End: 2025-04-29
Attending: EMERGENCY MEDICINE
Payer: MEDICAID

## 2025-04-29 ENCOUNTER — APPOINTMENT (OUTPATIENT)
Dept: INFECTIOUS DISEASE | Facility: CLINIC | Age: 50
End: 2025-04-29

## 2025-04-29 VITALS
TEMPERATURE: 98 F | HEIGHT: 63 IN | RESPIRATION RATE: 18 BRPM | HEART RATE: 97 BPM | OXYGEN SATURATION: 100 % | SYSTOLIC BLOOD PRESSURE: 147 MMHG | DIASTOLIC BLOOD PRESSURE: 81 MMHG

## 2025-04-29 DIAGNOSIS — Z90.49 ACQUIRED ABSENCE OF OTHER SPECIFIED PARTS OF DIGESTIVE TRACT: Chronic | ICD-10-CM

## 2025-04-29 DIAGNOSIS — Z98.890 OTHER SPECIFIED POSTPROCEDURAL STATES: Chronic | ICD-10-CM

## 2025-04-29 PROCEDURE — 99283 EMERGENCY DEPT VISIT LOW MDM: CPT

## 2025-04-29 PROCEDURE — 99284 EMERGENCY DEPT VISIT MOD MDM: CPT

## 2025-04-29 RX ORDER — OXYCODONE HYDROCHLORIDE 30 MG/1
1 TABLET ORAL
Qty: 8 | Refills: 0
Start: 2025-04-29 | End: 2025-04-30

## 2025-04-29 RX ORDER — OXYCODONE HYDROCHLORIDE 30 MG/1
15 TABLET ORAL ONCE
Refills: 0 | Status: DISCONTINUED | OUTPATIENT
Start: 2025-04-29 | End: 2025-04-29

## 2025-04-29 RX ADMIN — OXYCODONE HYDROCHLORIDE 15 MILLIGRAM(S): 30 TABLET ORAL at 12:44

## 2025-04-29 NOTE — ED ADULT TRIAGE NOTE - GLASGOW COMA SCALE: SCORE, MLM
[address] Counseling: I discussed with the patient the risks of ixekizumab including but not limited to immunosuppression, serious infections, worsening of inflammatory bowel disease and drug reactions.  The patient understands that monitoring is required including a PPD at baseline and must alert us or the primary physician if symptoms of infection or other concerning signs are noted.

## 2025-04-29 NOTE — ED ADULT NURSE NOTE - AVIAN FLU SYMPTOMS
OBESITY  Get plenty of exercise. The goal is to exercise 3-5 times a week. Try to get plenty of sleep. Drink mostly water. Do not drink juice, eat fresh fruit instead. Eat mostly vegetables and beans and nuts and seeds, cook with vegetable oil such as olive oil. Try to avoid sweetened or processed foods and drinks, avoid fried, breaded foods, try to eat less simple carbohydrates such as bread and baked goods, sweets, potatoes, rice, pasta, pizza, cereal, Chinese food, granola bars, etc. Utilize in clinic weight management program and individual nutritional counseling. Follow up with primary care provider with progress.    No

## 2025-04-29 NOTE — ED PROVIDER NOTE - OBJECTIVE STATEMENT
The patient is a 50 year old female presents with chronic back pain from chronic cauda equina syndrome requesting pain medication of oxycodone.  The patient is closely followed by her Neurologist and gets oxycodone but states that the medicaid insurance did not get cleared to get her monthly supply this month.  The patient had surgery 4 times for her back and had residual deficit.  No new neuro symptoms. No new trauma  No fever, No CP, No SOB, No abd pain

## 2025-04-29 NOTE — ED PROVIDER NOTE - PATIENT PORTAL LINK FT
You can access the FollowMyHealth Patient Portal offered by [facility] by registering at the following website: [email or web address]. By joining [facility]’s FollowMyHealth portal, you will also be able to view your health information using other applications (apps) compatible with our system.

## 2025-04-29 NOTE — ED ADULT NURSE NOTE - OBJECTIVE STATEMENT
Assumed care of patient in b2r. Pt a&ox4 rr even and unlabored presents to ed with c/o of general back pain. Reports pmhx of Adhesive Arachnoiditis, Cauda Equina Syndrome. Reports sees a neurologist for pain management medications but "lost license a month ago". Denies chest pain, sob, fever, chills, gu/gi symptoms. Pt educated on plan of care, pt able to successfully teach back plan of care to RN, RN will continue to reeducate pt during hospital stay.

## 2025-04-29 NOTE — ED PROVIDER NOTE - NEUROLOGICAL, MLM
Alert and oriented, no focal deficits, no motor or sensory deficits. L sided leg weakness  (chronic)

## 2025-04-29 NOTE — ED PROVIDER NOTE - ATTENDING CONTRIBUTION TO CARE
The patient presents with back pain with history of chronic back pain from chronic cuada equina syndrome who ran out oxycodone and here for med refill  No new injury no new neuro symptoms  Will dc home and follow up with PMD and Neurology    I, Jadon Salcedo, performed the initial face to face bedside interview with this patient regarding history of present illness, review of symptoms and relevant past medical, social and family history.  I completed an independent physical examination.  I was the initial provider who evaluated this patient. I have signed out the follow up of any pending tests (i.e. labs, radiological studies) to the resident.  I have communicated the patient’s plan of care and disposition with the resident

## 2025-04-29 NOTE — ED PROVIDER NOTE - CLINICAL SUMMARY MEDICAL DECISION MAKING FREE TEXT BOX
The patient presents with back pain with history of chronic back pain from chronic cuada equina syndrome who ran out oxycodone and here for med refill  No new injury no new neuro symptoms  Will dc home and follow up with PMD and Neurology

## 2025-05-05 ENCOUNTER — NON-APPOINTMENT (OUTPATIENT)
Age: 50
End: 2025-05-05

## 2025-05-05 ENCOUNTER — APPOINTMENT (OUTPATIENT)
Dept: GASTROENTEROLOGY | Facility: CLINIC | Age: 50
End: 2025-05-05

## 2025-05-07 ENCOUNTER — APPOINTMENT (OUTPATIENT)
Dept: GASTROENTEROLOGY | Facility: CLINIC | Age: 50
End: 2025-05-07

## 2025-05-13 ENCOUNTER — APPOINTMENT (OUTPATIENT)
Dept: INFECTIOUS DISEASE | Facility: CLINIC | Age: 50
End: 2025-05-13

## 2025-05-30 ENCOUNTER — EMERGENCY (EMERGENCY)
Facility: HOSPITAL | Age: 50
LOS: 1 days | End: 2025-05-30
Attending: EMERGENCY MEDICINE

## 2025-05-30 VITALS
OXYGEN SATURATION: 99 % | HEART RATE: 77 BPM | RESPIRATION RATE: 20 BRPM | DIASTOLIC BLOOD PRESSURE: 90 MMHG | TEMPERATURE: 98 F | SYSTOLIC BLOOD PRESSURE: 150 MMHG

## 2025-05-30 VITALS
SYSTOLIC BLOOD PRESSURE: 157 MMHG | WEIGHT: 187.39 LBS | DIASTOLIC BLOOD PRESSURE: 89 MMHG | HEART RATE: 89 BPM | RESPIRATION RATE: 20 BRPM | TEMPERATURE: 98 F | OXYGEN SATURATION: 100 % | HEIGHT: 63 IN

## 2025-05-30 DIAGNOSIS — Z90.49 ACQUIRED ABSENCE OF OTHER SPECIFIED PARTS OF DIGESTIVE TRACT: Chronic | ICD-10-CM

## 2025-05-30 DIAGNOSIS — Z98.890 OTHER SPECIFIED POSTPROCEDURAL STATES: Chronic | ICD-10-CM

## 2025-05-30 PROCEDURE — 99284 EMERGENCY DEPT VISIT MOD MDM: CPT

## 2025-05-30 PROCEDURE — 99283 EMERGENCY DEPT VISIT LOW MDM: CPT

## 2025-05-30 RX ORDER — OXYCODONE HYDROCHLORIDE 30 MG/1
1 TABLET ORAL
Qty: 12 | Refills: 0
Start: 2025-05-30 | End: 2025-06-02

## 2025-05-30 RX ORDER — OXYCODONE HYDROCHLORIDE 30 MG/1
15 TABLET ORAL ONCE
Refills: 0 | Status: DISCONTINUED | OUTPATIENT
Start: 2025-05-30 | End: 2025-05-30

## 2025-05-30 RX ADMIN — OXYCODONE HYDROCHLORIDE 15 MILLIGRAM(S): 30 TABLET ORAL at 17:58

## 2025-05-30 NOTE — ED PROVIDER NOTE - PATIENT PORTAL LINK FT
You can access the FollowMyHealth Patient Portal offered by [facility] by registering at the following website: [email or web address]. By joining [employer]’s FollowMyHealth portal, you will also be able to view your health information using other applications (apps) compatible with our system.

## 2025-05-30 NOTE — ED PROVIDER NOTE - OBJECTIVE STATEMENT
49yo F chronic back pain from chronic cauda equina syndrome, adhesive acronditis, presents to the ED for pain medication. Patient states that she sees a neurologist Dr. Jin but apparently he was unable to update his medicaid credentials, so patient is unable to get pain medication. patient states she has no new symptoms, and the pain she has is the same. no chest pain. no new neurological deficits. no shortness of breath.

## 2025-05-30 NOTE — ED PROVIDER NOTE - NSFOLLOWUPINSTRUCTIONS_ED_ALL_ED_FT
Please follow up with pain management, the location is written on this discharge paperwork below.    [facility]  [address], [address]  [place] [address]  Phone: [phone]     ================================  Chronic Back Pain  Chronic back pain is back pain that lasts longer than 3 months. The cause of your back pain may not be known. Some common causes include:  Wear and tear (degenerative disease) of the bones, disks, or tissues that connect bones to each other (ligaments) in your back.  Inflammation and stiffness in your back (arthritis).  If you have chronic back pain, you may have times when the pain is more intense (flare-ups). You can also learn to manage the pain with home care.    Follow these instructions at home:  Watch for any changes in your symptoms. Take these actions to help with your pain:    Managing pain and stiffness    A bag of ice on a towel on the skin.  A heating pad for use on the affected area.  If told, put ice on the painful area. You may be told to apply ice for the first 24–48 hours after a flare-up starts.  Put ice in a plastic bag.  Place a towel between your skin and the bag.  Leave the ice on for 20 minutes, 2–3 times per day.  If told, apply heat to the affected area as often as told by your health care provider. Use the heat source that your provider recommends, such as a moist heat pack or a heating pad.  Place a towel between your skin and the heat source.  Leave the heat on for 20–30 minutes.  If your skin turns bright red, remove the ice or heat right away to prevent skin damage. The risk of damage is higher if you cannot feel pain, heat, or cold.  Try soaking in a warm tub.  Activity    A person bending down and showing the correct posture to use when lifting a heavy object.  A person showing good posture while sitting at a desk and using a computer.  A person standing and ironing with one foot resting on a stable footstool to help keep the spine neutral.  Avoid bending and other activities that make the pain worse.  Have good posture when you stand or sit.  When you stand, keep your upper back and neck straight, with your shoulders pulled back. Avoid slouching.  When you sit, keep your back straight. Relax your shoulders. Do not round your shoulders or pull them backward.  Do not sit or  one place for too long.  Take brief periods of rest during the day. This will reduce your pain. Resting in a lying or standing position is often better than sitting to rest.  When you rest for longer periods, mix in some mild activity or stretching between periods of rest. This will help to prevent stiffness and pain.  Get regular exercise. Ask your provider what activities are safe for you.  You may have to avoid lifting. Ask your provider how much you can safely lift. If you do lift, always use the right technique. This means you should:  Bend your knees.  Keep the load close to your body.  Avoid twisting.  Medicines    Take over-the-counter and prescription medicines only as told by your provider.  You may need to take medicines for pain and inflammation. These may be taken by mouth or put on the skin. You may also be given muscle relaxants.  Ask your provider if the medicine prescribed to you:  Requires you to avoid driving or using machinery.  Can cause constipation. You may need to take these actions to prevent or treat constipation:  Drink enough fluid to keep your pee (urine) pale yellow.  Take over-the-counter or prescription medicines.  Eat foods that are high in fiber, such as beans, whole grains, and fresh fruits and vegetables.  Limit foods that are high in fat and processed sugars, such as fried or sweet foods.  General instructions    A person with one pillow sleeping on their side using the correct posture, keeping the back straight, shown by dashed line.  Sleep on a firm mattress in a comfortable position. Try lying on your side with your knees slightly bent. If you lie on your back, put a pillow under your knees.  Do not use any products that contain nicotine or tobacco. These products include cigarettes, chewing tobacco, and vaping devices, such as e-cigarettes. If you need help quitting, ask your provider.  Contact a health care provider if:  You have pain that does not get better with rest or medicine.  You have new pain.  You have a fever.  You lose weight quickly.  You have trouble doing your normal activities.  You feel weak or numb in one or both of your legs or feet.  Get help right away if:  You are not able to control when you pee or poop.  You have severe back pain and:  Nausea or vomiting.  Pain in your chest or abdomen.  Shortness of breath.  You faint.  These symptoms may be an emergency. Get help right away. Call 911.  Do not wait to see if the symptoms will go away.  Do not drive yourself to the hospital.  This information is not intended to replace advice given to you by your health care provider. Make sure you discuss any questions you have with your health care provider.

## 2025-05-30 NOTE — ED PROVIDER NOTE - ATTENDING CONTRIBUTION TO CARE
51yo F chronic back pain from chronic cauda equina syndrome states has no change in her chronic symptoms ran out of meds because provider no longer takes insurance , adhesive acronditis, presents to the ED for pain medication. Will give patient oxy 15 and outpatient medication.

## 2025-05-30 NOTE — ED PROVIDER NOTE - PHYSICAL EXAMINATION
Gen: well appearing, no acute distress  Head: normocephalic, atraumatic  EENT: EOMI, moist mucous membranes, no scleral icterus  Lung: no increased work of breathing, clear to auscultation bilaterally, no wheezing, rales, rhonchi, speaking in full sentences  CV: regular rate, regular rhythm, normal s1/s2, 2+ radial pulses bilaterally  Abd: soft, non-tender, non-distended,    MSK: No edema, no visible deformities,   Neuro: Awake, alert, no focal neurologic deficits, +left foot drop (not new)  Skin: No obvious rash, no jaundice  Psych: normal affect, normal speech

## 2025-05-30 NOTE — ED ADULT TRIAGE NOTE - CHIEF COMPLAINT QUOTE
Patient presents to ER C/O back pain, denies recent injuries, HX of cauda equina syndrome, states she ran ou of pain medications..

## 2025-05-30 NOTE — ED PROVIDER NOTE - CLINICAL SUMMARY MEDICAL DECISION MAKING FREE TEXT BOX
49yo F chronic back pain from chronic cauda equina syndrome, adhesive acronditis, presents to the ED for pain medication. Will give patient oxy 15 and outpatient medication.

## 2025-05-31 RX ORDER — OXYCODONE HYDROCHLORIDE 30 MG/1
1 TABLET ORAL
Qty: 12 | Refills: 0
Start: 2025-05-31 | End: 2025-06-03

## 2025-06-19 ENCOUNTER — APPOINTMENT (OUTPATIENT)
Dept: INTERNAL MEDICINE | Facility: CLINIC | Age: 50
End: 2025-06-19

## 2025-06-24 ENCOUNTER — APPOINTMENT (OUTPATIENT)
Dept: GASTROENTEROLOGY | Facility: CLINIC | Age: 50
End: 2025-06-24

## 2025-08-06 PROBLEM — Z12.11 COLON CANCER SCREENING: Status: ACTIVE | Noted: 2025-08-06

## 2025-08-06 PROBLEM — Z12.4 CERVICAL CANCER SCREENING: Status: ACTIVE | Noted: 2025-08-06

## 2025-08-13 ENCOUNTER — APPOINTMENT (OUTPATIENT)
Dept: OBGYN | Facility: CLINIC | Age: 50
End: 2025-08-13

## 2025-08-13 DIAGNOSIS — Z01.419 ENCOUNTER FOR GYNECOLOGICAL EXAMINATION (GENERAL) (ROUTINE) W/OUT ABNORMAL FINDINGS: ICD-10-CM

## 2025-08-13 DIAGNOSIS — Z12.11 ENCOUNTER FOR SCREENING FOR MALIGNANT NEOPLASM OF COLON: ICD-10-CM

## 2025-08-13 DIAGNOSIS — Z12.4 ENCOUNTER FOR SCREENING FOR MALIGNANT NEOPLASM OF CERVIX: ICD-10-CM

## (undated) DEVICE — UNDERPAD LINEN SAVER 17 X 24"

## (undated) DEVICE — BIOPSY FORCEP COLD DISP

## (undated) DEVICE — CONTAINER FORMALIN 80ML YELLOW

## (undated) DEVICE — PACK IV START WITH CHG

## (undated) DEVICE — LUBRICATING JELLY HR ONE SHOT 3G

## (undated) DEVICE — TUBING MEDI-VAC W MAXIGRIP CONNECTORS 1/4"X6'

## (undated) DEVICE — DRSG BANDAID 0.75X3"

## (undated) DEVICE — DRSG 2X2

## (undated) DEVICE — DRSG CURITY GAUZE SPONGE 4 X 4" 12-PLY NON-STERILE

## (undated) DEVICE — SALIVA EJECTOR (BLUE)

## (undated) DEVICE — ELCTR ECG CONDUCTIVE ADHESIVE

## (undated) DEVICE — TUBING IV SET GRAVITY 3Y 100" MACRO

## (undated) DEVICE — GOWN LG

## (undated) DEVICE — BASIN EMESIS 10IN GRADUATED MAUVE

## (undated) DEVICE — CATH IV SAFE BC 22G X 1" (BLUE)

## (undated) DEVICE — CLAMP BX HOT RAD JAW 3

## (undated) DEVICE — BITE BLOCK ADULT 20 X 27MM (GREEN)

## (undated) DEVICE — BIOPSY FORCEP RADIAL JAW 4 STANDARD WITH NEEDLE

## (undated) DEVICE — DENTURE CUP PINK